# Patient Record
Sex: MALE | Race: WHITE | NOT HISPANIC OR LATINO | Employment: OTHER | URBAN - METROPOLITAN AREA
[De-identification: names, ages, dates, MRNs, and addresses within clinical notes are randomized per-mention and may not be internally consistent; named-entity substitution may affect disease eponyms.]

---

## 2017-06-23 ENCOUNTER — OFFICE VISIT (OUTPATIENT)
Dept: INTERNAL MEDICINE | Facility: CLINIC | Age: 62
End: 2017-06-23
Payer: COMMERCIAL

## 2017-06-23 VITALS
WEIGHT: 207.81 LBS | TEMPERATURE: 98 F | RESPIRATION RATE: 18 BRPM | BODY MASS INDEX: 28.15 KG/M2 | SYSTOLIC BLOOD PRESSURE: 120 MMHG | OXYGEN SATURATION: 98 % | HEIGHT: 72 IN | HEART RATE: 80 BPM | DIASTOLIC BLOOD PRESSURE: 70 MMHG

## 2017-06-23 DIAGNOSIS — Z78.9 PARTICIPANT IN HEALTH AND WELLNESS PLAN: Primary | ICD-10-CM

## 2017-06-23 PROCEDURE — 99386 PREV VISIT NEW AGE 40-64: CPT | Mod: S$GLB,,, | Performed by: INTERNAL MEDICINE

## 2017-06-23 RX ORDER — TADALAFIL 10 MG/1
10 TABLET ORAL DAILY PRN
Qty: 30 TABLET | Refills: 5 | Status: SHIPPED | OUTPATIENT
Start: 2017-06-23 | End: 2018-08-10

## 2017-06-23 NOTE — PROGRESS NOTES
Subjective:      Patient ID: Paddy Nair is a 61 y.o. male.    Chief Complaint: Medicare AWV (pt in for a wellness check)    HPI: 61y/oWM here to establish care thru a wellness visit.  PMH: none  SH: retired . + tab [id number] years. No ETOH NKA 2 healthy sons No pets         No exercise.  FH: Father ,61 of prostate cancer.    Review of Systems   Constitutional: Negative.    HENT: Negative.    Eyes: Negative.    Respiratory: Negative.    Cardiovascular: Negative.    Gastrointestinal: Negative.    Endocrine: Negative.    Genitourinary:        ED   Musculoskeletal: Negative.    Skin: Negative.    Allergic/Immunologic: Negative.    Neurological: Negative.    Hematological: Negative.    Psychiatric/Behavioral: Negative.        Objective:   /70 (BP Location: Right arm, Patient Position: Sitting, BP Method: Manual)   Pulse 80   Temp 98.4 °F (36.9 °C) (Oral)   Resp 18   Ht 6' (1.829 m)   Wt 94.3 kg (207 lb 12.5 oz)   SpO2 98%   BMI 28.18 kg/m²     Physical Exam   Constitutional: He is oriented to person, place, and time. He appears well-developed and well-nourished.   HENT:   Head: Normocephalic and atraumatic.   Right Ear: Tympanic membrane, external ear and ear canal normal. Decreased hearing is noted.   Left Ear: Tympanic membrane, external ear and ear canal normal. Decreased hearing is noted.   Nose: Nose normal.   Mouth/Throat: Uvula is midline, oropharynx is clear and moist and mucous membranes are normal. Tonsils are 1+ on the right. Tonsils are 1+ on the left.   Eyes: Conjunctivae and EOM are normal. Pupils are equal, round, and reactive to light.   Neck: Normal range of motion. Neck supple. No JVD present. No thyromegaly present.   Cardiovascular: Normal rate, regular rhythm, normal heart sounds and intact distal pulses.    Pulmonary/Chest: Effort normal and breath sounds normal.   Abdominal: Soft. Bowel sounds are normal.   Musculoskeletal: Normal range of motion.    Lymphadenopathy:     He has no cervical adenopathy.   Neurological: He is alert and oriented to person, place, and time. He has normal reflexes.   Skin: Skin is warm and dry. Capillary refill takes less than 2 seconds.   Psychiatric: He has a normal mood and affect. His behavior is normal. Judgment and thought content normal.   Nursing note and vitals reviewed.      Assessment:     1. Participant in health and wellness plan      Plan:     Participant in health and wellness plan  -     Ambulatory referral to Smoking Cessation Program  -     Cancel: CBC auto differential; Future; Expected date: 06/23/2017  -     Cancel: Lipid panel; Future; Expected date: 06/23/2017  -     Cancel: Comprehensive metabolic panel; Future; Expected date: 06/23/2017  -     Cancel: PSA, Screening; Future; Expected date: 06/23/2017  -     Cancel: Urinalysis; Future; Expected date: 06/23/2017  -     X-Ray Chest PA And Lateral; Future; Expected date: 06/23/2017  -     CBC auto differential; Future; Expected date: 06/23/2017  -     Lipid panel; Future; Expected date: 06/23/2017  -     Comprehensive metabolic panel; Future; Expected date: 06/23/2017  -     PSA, Screening; Future; Expected date: 06/23/2017  -     Urinalysis; Future; Expected date: 06/23/2017    Other orders  -     tadalafil (CIALIS) 10 MG tablet; Take 1 tablet (10 mg total) by mouth daily as needed for Erectile Dysfunction.  Dispense: 30 tablet; Refill: 5

## 2017-07-17 ENCOUNTER — TELEPHONE (OUTPATIENT)
Dept: INTERNAL MEDICINE | Facility: CLINIC | Age: 62
End: 2017-07-17

## 2017-07-17 LAB
ALBUMIN SERPL-MCNC: 4.3 G/DL (ref 3.6–5.1)
ALBUMIN/GLOB SERPL: 1.5 (CALC) (ref 1–2.5)
ALP SERPL-CCNC: 71 U/L (ref 40–115)
ALT SERPL-CCNC: 16 U/L (ref 9–46)
AST SERPL-CCNC: 16 U/L (ref 10–35)
BASOPHILS # BLD AUTO: 14 CELLS/UL (ref 0–200)
BASOPHILS NFR BLD AUTO: 0.2 %
BILIRUB SERPL-MCNC: 0.5 MG/DL (ref 0.2–1.2)
BUN SERPL-MCNC: 13 MG/DL (ref 7–25)
BUN/CREAT SERPL: NORMAL (CALC) (ref 6–22)
CALCIUM SERPL-MCNC: 9.2 MG/DL (ref 8.6–10.3)
CHLORIDE SERPL-SCNC: 104 MMOL/L (ref 98–110)
CHOLEST SERPL-MCNC: 214 MG/DL (ref 125–200)
CHOLEST/HDLC SERPL: 5.9 (CALC)
CO2 SERPL-SCNC: 28 MMOL/L (ref 20–31)
COLOR UR: NORMAL
CREAT SERPL-MCNC: 0.97 MG/DL (ref 0.7–1.25)
EOSINOPHIL # BLD AUTO: 112 CELLS/UL (ref 15–500)
EOSINOPHIL NFR BLD AUTO: 1.6 %
ERYTHROCYTE [DISTWIDTH] IN BLOOD BY AUTOMATED COUNT: 14.7 % (ref 11–15)
GFR SERPL CREATININE-BSD FRML MDRD: 84 ML/MIN/1.73M2
GLOBULIN SER CALC-MCNC: 2.9 G/DL (CALC) (ref 1.9–3.7)
GLUCOSE SERPL-MCNC: 97 MG/DL (ref 65–99)
HCT VFR BLD AUTO: 43.9 % (ref 38.5–50)
HDLC SERPL-MCNC: 36 MG/DL
HGB BLD-MCNC: 14.6 G/DL (ref 13.2–17.1)
LDLC SERPL CALC-MCNC: 144 MG/DL (CALC)
LYMPHOCYTES # BLD AUTO: 1862 CELLS/UL (ref 850–3900)
LYMPHOCYTES NFR BLD AUTO: 26.6 %
MCH RBC QN AUTO: 30.6 PG (ref 27–33)
MCHC RBC AUTO-ENTMCNC: 33.3 G/DL (ref 32–36)
MCV RBC AUTO: 92 FL (ref 80–100)
MONOCYTES # BLD AUTO: 371 CELLS/UL (ref 200–950)
MONOCYTES NFR BLD AUTO: 5.3 %
NEUTROPHILS # BLD AUTO: 4641 CELLS/UL (ref 1500–7800)
NEUTROPHILS NFR BLD AUTO: 66.3 %
NONHDLC SERPL-MCNC: 178 MG/DL (CALC)
PLATELET # BLD AUTO: 193 THOUSAND/UL (ref 140–400)
PMV BLD REES-ECKER: 9 FL (ref 7.5–12.5)
POTASSIUM SERPL-SCNC: 4.1 MMOL/L (ref 3.5–5.3)
PROT SERPL-MCNC: 7.2 G/DL (ref 6.1–8.1)
PSA SERPL-MCNC: 2.5 NG/ML
RBC # BLD AUTO: 4.77 MILLION/UL (ref 4.2–5.8)
SODIUM SERPL-SCNC: 141 MMOL/L (ref 135–146)
TRIGL SERPL-MCNC: 170 MG/DL
WBC # BLD AUTO: 7 THOUSAND/UL (ref 3.8–10.8)

## 2017-07-17 NOTE — TELEPHONE ENCOUNTER
----- Message from Sushma Espinoza sent at 7/17/2017  1:02 PM CDT -----  Contact: [phone]  TEST RESULTS:   Patient would like to get test results.  Name of test (lab, mammo, etc.): labs  Date of test    All results are in Xray of chest also blood work. A note from [facility] pt urine was not sent out correctly by lab person. Lab did not send urine in yellow tube. Pt has to resubmit another specimen. Vf/ma

## 2017-08-01 ENCOUNTER — TELEPHONE (OUTPATIENT)
Dept: INTERNAL MEDICINE | Facility: CLINIC | Age: 62
End: 2017-08-01

## 2017-08-01 DIAGNOSIS — Z00.00 ANNUAL PHYSICAL EXAM: Primary | ICD-10-CM

## 2017-08-01 NOTE — TELEPHONE ENCOUNTER
Patient's urine specimen from 6/2017 was not done due to lab error. Do you want him to re due this test?

## 2017-08-01 NOTE — TELEPHONE ENCOUNTER
----- Message from Sun Caro sent at 8/1/2017 12:58 PM CDT -----  Contact: patient   Patient called and would like to know if he needs to repeat his urine test, because something happened in the labs were it got messed up he did them here through [facility] . Call back [phone]

## 2017-11-06 ENCOUNTER — CLINICAL SUPPORT (OUTPATIENT)
Dept: SMOKING CESSATION | Facility: CLINIC | Age: 62
End: 2017-11-06
Payer: COMMERCIAL

## 2017-11-06 DIAGNOSIS — F17.210 HEAVY CIGARETTE SMOKER (20-39 PER DAY): Primary | ICD-10-CM

## 2017-11-06 PROCEDURE — 99999 PR PBB SHADOW E&M-EST. PATIENT-LVL I: CPT | Mod: PBBFAC,,,

## 2017-11-06 PROCEDURE — 99404 PREV MED CNSL INDIV APPRX 60: CPT | Mod: S$GLB,,,

## 2017-11-06 RX ORDER — VARENICLINE TARTRATE 0.5 (11)-1
KIT ORAL
Qty: 1 PACKAGE | Refills: 0 | Status: SHIPPED | OUTPATIENT
Start: 2017-11-06 | End: 2017-11-28 | Stop reason: SDUPTHER

## 2017-11-06 RX ORDER — IBUPROFEN 200 MG
1 TABLET ORAL DAILY
Qty: 14 PATCH | Refills: 0 | Status: SHIPPED | OUTPATIENT
Start: 2017-11-06 | End: 2018-09-21

## 2017-11-14 ENCOUNTER — CLINICAL SUPPORT (OUTPATIENT)
Dept: SMOKING CESSATION | Facility: CLINIC | Age: 62
End: 2017-11-14
Payer: COMMERCIAL

## 2017-11-14 DIAGNOSIS — F17.210 CIGARETTE SMOKER: Primary | ICD-10-CM

## 2017-11-14 PROCEDURE — 90853 GROUP PSYCHOTHERAPY: CPT | Mod: S$GLB,,,

## 2017-11-14 NOTE — Clinical Note
Patient is smoking 15 cpd down from 1 ppd last week.  Patient will attempt 12 cpd this week.  The patient remains on the prescribed tobacco cessation medication regimen of starter pack  Chantix 0.5 mg BID without any negative side effects at this time. Patient remains on prescribed tobacco cessation medication regimen of 21 mg patch, without any negative side effects at this time. The patient denies any abnormal behavioral or mental changes at this time

## 2017-11-16 NOTE — PROGRESS NOTES
Site: [facility]  Date:  11/14/17  Clinical Status of Patient: Outpatient   Length of Service and Code: 60 minutes - 75318   Number in Attendance: 3  Group Activities/Focus of Group:  orientation, client introductions, completion of TCRS (Tobacco Cessation Rating Scale) learned addiction model, cues/triggers, personal reasons for quitting, medications, goals, quit date    Target symptoms:  withdrawal and medication side effects             The following were rated moderate (3) to severe (4) on TCRS:       Moderate 3: none     Severe 4:   none  Patient's Response to Intervention: Patient is smoking 15 cpd down from 1 ppd last week.  Patient will attempt 12 cpd this week.  The patient remains on the prescribed tobacco cessation medication regimen of starter pack  Chantix 0.5 mg BID without any negative side effects at this time. Patient remains on prescribed tobacco cessation medication regimen of 21 mg patch, without any negative side effects at this time. The patient denies any abnormal behavioral or mental changes at this time. The patient will continue with group therapy sessions and medication monitoring by CTTS. Prescribed medication management will be by physician.           Progress Toward Goals and Other Mental Status Changes: The patient denies any abnormal behavioral or mental changes at this time.     Interval History:     Diagnosis: Z72.0  Plan: The patient will continue with group therapy sessions and medication regimen prescribed with management by physician or Cessation Clinic Provider. Patient will inform Smoking Clinic Cessation Counselor of symptoms as rated high on TCRS.    Return to Clinic: 1 week

## 2017-11-21 ENCOUNTER — CLINICAL SUPPORT (OUTPATIENT)
Dept: SMOKING CESSATION | Facility: CLINIC | Age: 62
End: 2017-11-21
Payer: COMMERCIAL

## 2017-11-21 DIAGNOSIS — F17.210 LIGHT CIGARETTE SMOKER (1-9 CIGARETTES PER DAY): Primary | ICD-10-CM

## 2017-11-21 PROCEDURE — 99999 PR PBB SHADOW E&M-EST. PATIENT-LVL I: CPT | Mod: PBBFAC,,,

## 2017-11-21 PROCEDURE — 99404 PREV MED CNSL INDIV APPRX 60: CPT | Mod: S$GLB,,,

## 2017-11-21 NOTE — Clinical Note
Patient is smoking 10 cpd .  Patient will attempt 5 cpd this week.  The patient remains on the prescribed tobacco cessation medication regimen of 1 mg Chantix BID without any negative side effects at this time. The patient denies any abnormal behavioral or mental changes at this time. The patient will continue with group therapy sessions and medication monitoring by CTTS.

## 2017-11-28 ENCOUNTER — CLINICAL SUPPORT (OUTPATIENT)
Dept: SMOKING CESSATION | Facility: CLINIC | Age: 62
End: 2017-11-28
Payer: COMMERCIAL

## 2017-11-28 DIAGNOSIS — F17.210 LIGHT CIGARETTE SMOKER (1-9 CIGS/DAY): Primary | ICD-10-CM

## 2017-11-28 PROCEDURE — 99404 PREV MED CNSL INDIV APPRX 60: CPT | Mod: S$GLB,,,

## 2017-11-28 PROCEDURE — 99999 PR PBB SHADOW E&M-EST. PATIENT-LVL I: CPT | Mod: PBBFAC,,,

## 2017-11-28 RX ORDER — VARENICLINE TARTRATE 1 MG/1
1 TABLET, FILM COATED ORAL 2 TIMES DAILY
Qty: 60 TABLET | Refills: 0 | Status: SHIPPED | OUTPATIENT
Start: 2017-11-28 | End: 2017-12-28

## 2017-11-28 NOTE — PROGRESS NOTES
Individual Follow-Up Form    11/27/17    Quit Date:     Clinical Status of Patient: Outpatient    Length of Service: 60 minutes    Continuing Medication: yes  Chantix    Other Medications: none     Target Symptoms: Withdrawal and medication side effects. The following were  rated moderate (3) to severe (4) on TCRS:  · Moderate (3): none  · Severe (4): none    Comments: Patient is smoking 10 cpd .  Patient will attempt 5 cpd this week.  The patient remains on the prescribed tobacco cessation medication regimen of 1 mg Chantix BID without any negative side effects at this time. The patient denies any abnormal behavioral or mental changes at this time. The patient will continue with group therapy sessions and medication monitoring by CTTS. Prescribed medication management will be by physician.completion of TCRS (Tobacco Cessation Rating Scale) reviewed strategies, cues, and triggers. Introduced the negative impact of tobacco on health, the health advantages of discontinuing the use of tobacco, time line improved health changes after a quit, withdrawal issues to expect from nicotine and habit, and ways to achieve the goal of a quit.          Diagnosis: F17.210    Next Visit: 1 week

## 2017-11-28 NOTE — Clinical Note
Patient is smoking 4-6 cpd.  Patient will attempt complete tobacco cessation this week.  The patient remains on the prescribed tobacco cessation medication regimen of 1 mg Chantix BID without any negative side effects at this time. The patient denies any abnormal behavioral or mental changes at this time.

## 2017-11-29 DIAGNOSIS — F17.210 LIGHT CIGARETTE SMOKER (1-9 CIGS/DAY): ICD-10-CM

## 2017-11-29 RX ORDER — VARENICLINE TARTRATE 1 MG/1
1 TABLET, FILM COATED ORAL 2 TIMES DAILY
Qty: 60 TABLET | Refills: 0 | OUTPATIENT
Start: 2017-11-29 | End: 2017-12-29

## 2017-11-29 NOTE — PROGRESS NOTES
Individual Follow-Up Form    11/28/17    Quit Date:     Clinical Status of Patient: Outpatient    Length of Service: 60 minutes    Continuing Medication: yes  Chantix    Other Medications: none     Target Symptoms: Withdrawal and medication side effects. The following were  rated moderate (3) to severe (4) on TCRS:  · Moderate (3): none  · Severe (4): none    Comments: Patient is smoking 4-6 cpd.  Patient will attempt complete tobacco cessation this week.  The patient remains on the prescribed tobacco cessation medication regimen of 1 mg Chantix BID without any negative side effects at this time. The patient denies any abnormal behavioral or mental changes at this time. The patient will continue with group therapy sessions and medication monitoring by CTTS. Prescribed medication management will be by physician.           Diagnosis: F17.210    Next Visit: 1 week

## 2017-12-04 ENCOUNTER — CLINICAL SUPPORT (OUTPATIENT)
Dept: SMOKING CESSATION | Facility: CLINIC | Age: 62
End: 2017-12-04
Payer: COMMERCIAL

## 2017-12-04 DIAGNOSIS — F17.210 LIGHT SMOKER: Primary | ICD-10-CM

## 2017-12-04 PROCEDURE — 99404 PREV MED CNSL INDIV APPRX 60: CPT | Mod: S$GLB,,,

## 2017-12-04 PROCEDURE — 99999 PR PBB SHADOW E&M-EST. PATIENT-LVL I: CPT | Mod: PBBFAC,,,

## 2017-12-04 NOTE — Clinical Note
Patient is smoking 1-2 cigarettes per day.  Patient will attempt complete tobacco cessation this week.  The patient remains on the prescribed tobacco cessation medication regimen of 1 mg Chantix BID without any negative side effects at this time. The patient will continue with group therapy sessions and medication monitoring by CTTS. Prescribed medication management will be by physician.

## 2017-12-05 NOTE — PROGRESS NOTES
Individual Follow-Up Form    12/5/2017    Quit Date:     Clinical Status of Patient: Outpatient    Length of Service: 60 minutes    Continuing Medication: yes  Chantix    Other Medications: none     Target Symptoms: Withdrawal and medication side effects. The following were  rated moderate (3) to severe (4) on TCRS:  · Moderate (3): none  · Severe (4): none    Comments: Patient is smoking 1-2 cigarettes per day.  Patient will attempt complete tobacco cessation this week.  The patient remains on the prescribed tobacco cessation medication regimen of 1 mg Chantix BID without any negative side effects at this time. The patient will continue with group therapy sessions and medication monitoring by CTTS. Prescribed medication management will be by physician. completion of TCRS (Tobacco Cessation Rating Scale) reviewed strategies, habitual behavior, high risks situations, understanding urges and cravings, stress and relaxation with open discussion and additional interventions, Introduced lapses, relapses, understanding them and analyzing the situation of a lapse, conflict issues that may be linked to a lapse.           Diagnosis: F17.210    Next Visit: 1 week

## 2017-12-12 ENCOUNTER — CLINICAL SUPPORT (OUTPATIENT)
Dept: SMOKING CESSATION | Facility: CLINIC | Age: 62
End: 2017-12-12
Payer: COMMERCIAL

## 2017-12-12 DIAGNOSIS — F17.200 NICOTINE DEPENDENCE: Primary | ICD-10-CM

## 2017-12-12 PROCEDURE — 99999 PR PBB SHADOW E&M-EST. PATIENT-LVL II: CPT | Mod: PBBFAC,,,

## 2017-12-12 PROCEDURE — 99402 PREV MED CNSL INDIV APPRX 30: CPT | Mod: S$GLB,,, | Performed by: FAMILY MEDICINE

## 2017-12-12 NOTE — Clinical Note
Patient presents to clinic tobacco free! Pt has not had a cigarette since last Sunday. Congratulated pt on his hard work and success. The patient remains on the prescribed tobacco cessation medication regimen of 1 mg Chantix BID without any negative side effects at this time. Pt's goal is to continue to remain tobacco free and continue Chantix for the the next 2 months. Completion of TCRS (Tobacco Cessation Rating Scale) reviewed strategies, cues, triggers, high risk situations, lapses, relapses, diet, exercise, stress, relaxation, sleep, habitual behavior, and life style changes. Pt is to f/u in 3 weeks.

## 2017-12-12 NOTE — PROGRESS NOTES
Individual Follow-Up Form    12/12/2017    Quit Date: 12/4/17    Clinical Status of Patient: Outpatient    Length of Service: 30 minutes    Continuing Medication: yes  Chantix    Other Medications: none     Target Symptoms: Withdrawal and medication side effects. The following were  rated moderate (3) to severe (4) on TCRS:  · Moderate (3): none  · Severe (4): none    Comments: Patient presents to clinic tobacco free! Pt has not had a cigarette since last Sunday. Congratulated pt on his hard work and success. The patient remains on the prescribed tobacco cessation medication regimen of 1 mg Chantix BID without any negative side effects at this time. Pt's goal is to continue to remain tobacco free and continue Chantix for the the next 2 months. Completion of TCRS (Tobacco Cessation Rating Scale) reviewed strategies, cues, triggers, high risk situations, lapses, relapses, diet, exercise, stress, relaxation, sleep, habitual behavior, and life style changes. Pt is to f/u in 3 weeks.       Diagnosis: F17.200    Next Visit: 3 weeks

## 2018-01-02 ENCOUNTER — CLINICAL SUPPORT (OUTPATIENT)
Dept: SMOKING CESSATION | Facility: CLINIC | Age: 63
End: 2018-01-02
Payer: COMMERCIAL

## 2018-01-02 DIAGNOSIS — F17.210 NICOTINE DEPENDENCE, CIGARETTES, UNCOMPLICATED: Primary | ICD-10-CM

## 2018-01-02 PROCEDURE — 99999 PR PBB SHADOW E&M-EST. PATIENT-LVL I: CPT | Mod: PBBFAC,,,

## 2018-01-02 PROCEDURE — 99402 PREV MED CNSL INDIV APPRX 30: CPT | Mod: S$GLB,,, | Performed by: FAMILY MEDICINE

## 2018-01-02 RX ORDER — VARENICLINE TARTRATE 1 MG/1
1 TABLET, FILM COATED ORAL 2 TIMES DAILY
Qty: 60 TABLET | Refills: 0 | Status: SHIPPED | OUTPATIENT
Start: 2018-01-02 | End: 2018-02-01

## 2018-01-02 NOTE — Clinical Note
completion of TCRS (Tobacco Cessation Rating Scale) reviewed strategies, habitual behavior, stress, and high risk situations. Introduced stress with addition interventions, SOLVE, relaxation with interventions, nutrition, exercise, weight gain, and the importance of rewarding oneself for accomplishments toward becoming tobacco free. Open discussion of all items with interventions. The patient remains tobacco free at this time. Discussed with patient about habits and to think of strategies for when not taking Chantix. Discussed diversion and activity The patient was supplied with the last refill of  1 mg BID Chantix.

## 2018-01-02 NOTE — PROGRESS NOTES
Individual Follow-Up Form    1/2/2018    Quit Date: ***    Clinical Status of Patient: Outpatient    Length of Service: {Smoking Length of Service:24482}    Continuing Medication: {Smoking Cessation Yes/No with medication:69433}    Other Medications: ***     Target Symptoms: Withdrawal and medication side effects. The following were  rated moderate (3) to severe (4) on TCRS:  · Moderate (3): ***  · Severe (4): ***    Comments: ***    Diagnosis: {Smoking Cessation Diagnosis:76855}    Next Visit: {Smoking Cessation Next Phone Intervention:86673}

## 2018-01-02 NOTE — PROGRESS NOTES
Individual Follow-Up Form    1/2/2018    Quit Date: 12/4/17    Clinical Status of Patient: Outpatient    Length of Service: 30 minutes    Continuing Medication: yes  Chantix    Other Medications: none     Target Symptoms: Withdrawal and medication side effects. The following were  rated moderate (3) to severe (4) on TCRS:  · Moderate (3): none  · Severe (4): none    Comments: completion of TCRS (Tobacco Cessation Rating Scale) reviewed strategies, habitual behavior, stress, and high risk situations. Introduced stress with addition interventions, SOLVE, relaxation with interventions, nutrition, exercise, weight gain, and the importance of rewarding oneself for accomplishments toward becoming tobacco free. Open discussion of all items with interventions. The patient remains tobacco free at this time. Discussed with patient about habits and to think of strategies for when not taking Chantix. Discussed diversion and activity The patient was supplied with the last refill of  1 mg BID Chantix. The patient will continue with group therapy sessions and medication monitoring by CTTS. Prescribed medication management will be by physician.     Diagnosis: F17.210    Next Visit: 2 weeks

## 2018-01-02 NOTE — Clinical Note
completion of TCRS (Tobacco Cessation Rating Scale) reviewed strategies, habitual behavior, stress, and high risk situations. Introduced stress with addition interventions, SOLVE, relaxation with interventions, nutrition, exercise, weight gain, and the importance of rewarding oneself for accomplishments toward becoming tobacco free. Open discussion of all items with interventions. The patient remains tobacco free at this time. Discussed with patient about habits and to think of strategies for when not taking Chantix. Discussed diversion and activity The patient was supplied with the last refill of  1 mg BID Chantix. The patient will continue with group therapy sessions

## 2018-01-02 NOTE — PROGRESS NOTES
Individual Follow-Up Form    1/2/2018    Quit Date: ***    Clinical Status of Patient: Outpatient    Length of Service: {Smoking Length of Service:78619}    Continuing Medication: {Smoking Cessation Yes/No with medication:89968}    Other Medications: ***     Target Symptoms: Withdrawal and medication side effects. The following were  rated moderate (3) to severe (4) on TCRS:  · Moderate (3): ***  · Severe (4): ***    Comments: ***    Diagnosis: {Smoking Cessation Diagnosis:51273}    Next Visit: {Smoking Cessation Next Phone Intervention:78780}

## 2018-01-15 ENCOUNTER — CLINICAL SUPPORT (OUTPATIENT)
Dept: SMOKING CESSATION | Facility: CLINIC | Age: 63
End: 2018-01-15
Payer: COMMERCIAL

## 2018-01-15 VITALS — SYSTOLIC BLOOD PRESSURE: 121 MMHG | DIASTOLIC BLOOD PRESSURE: 102 MMHG

## 2018-01-15 DIAGNOSIS — F17.210 NICOTINE DEPENDENCE, CIGARETTES, UNCOMPLICATED: Primary | ICD-10-CM

## 2018-01-15 PROCEDURE — 99999 PR PBB SHADOW E&M-EST. PATIENT-LVL II: CPT | Mod: PBBFAC,,,

## 2018-01-15 PROCEDURE — 99402 PREV MED CNSL INDIV APPRX 30: CPT | Mod: S$GLB,,, | Performed by: FAMILY MEDICINE

## 2018-01-15 NOTE — PROGRESS NOTES
Individual Follow-Up Form    1/15/2018    Quit Date: 12/4/17    Clinical Status of Patient: Outpatient    Length of Service: 30 minutes    Continuing Medication: yes  Chantix    Other Medications: none     Target Symptoms: Withdrawal and medication side effects. The following were  rated moderate (3) to severe (4) on TCRS:  · Moderate (3): none  · Severe (4): none    Comments: The patient has been smoke free since 12/4/17and continues on 1 mg Chantix BID for 2 weeks. The patient denies any abnormal behavorial or mental changes at this time. The patient states he is able to be around smokers without any cravings at this time. Discussed how to mange cravings or desires in the future. The paitient remains optimistic about quitting cigarette smoking.The patient will continue with individual therapy sessions and medication monitoring by CTTS. Prescribed medication management will be by physician.     Diagnosis: F17.210    Next Visit: 4 weeks

## 2018-01-15 NOTE — Clinical Note
The patient has been smoke free since 12/4/17and continues on 1 mg Chantix BID for 2 weeks. The patient denies any abnormal behavorial or mental changes at this time. The patient states he is able to be around smokers without any cravings at this time. Discussed how to mange cravings or desires in the future. The paitient remains optimistic about quitting cigarette smoking.

## 2018-02-12 ENCOUNTER — CLINICAL SUPPORT (OUTPATIENT)
Dept: SMOKING CESSATION | Facility: CLINIC | Age: 63
End: 2018-02-12
Payer: COMMERCIAL

## 2018-02-12 DIAGNOSIS — F17.210 NICOTINE DEPENDENCE, CIGARETTES, UNCOMPLICATED: Primary | ICD-10-CM

## 2018-02-12 PROCEDURE — 99404 PREV MED CNSL INDIV APPRX 60: CPT | Mod: S$GLB,,, | Performed by: FAMILY MEDICINE

## 2018-02-12 PROCEDURE — 99999 PR PBB SHADOW E&M-EST. PATIENT-LVL I: CPT | Mod: PBBFAC,,,

## 2018-02-12 NOTE — Clinical Note
The patient has been smoke free since 12/4/17 and has not been using Chantix for about 2 weeks. The patient is able to be around friends that smoke and still not have cravings. The patient is aware of high risk situations such as alcohol, stress, etc. The patient has support from spouse that is now smoke free and feels positive about this quit and remaining tobacco free. The patient was instructed to use gum for any extreme cravings and to call if cravings/desires return.

## 2018-02-13 NOTE — PROGRESS NOTES
Individual Follow-Up Form    2/12/2018    Quit Date: 12/4/18    Clinical Status of Patient: Outpatient    Length of Service: 30 minutes    Continuing Medication: no    Other Medications: none     Target Symptoms: Withdrawal and medication side effects. The following were  rated moderate (3) to severe (4) on TCRS:  · Moderate (3): none  · Severe (4): none    Comments: The patient has been smoke free since 12/4/17 and has not been using Chantix for about 2 weeks. The patient is able to be around friends that smoke and still not have cravings. The patient is aware of high risk situations such as alcohol, stress, etc. The patient has support from spouse that is now smoke free and feels positive about this quit and remaining tobacco free. The patient was instructed to use gum for any extreme cravings and to call if cravings/desires return.    Diagnosis: F17.210    Next Visit The patient has graduated from program after tobacco free for 2 months.

## 2018-05-30 ENCOUNTER — TELEPHONE (OUTPATIENT)
Dept: SMOKING CESSATION | Facility: CLINIC | Age: 63
End: 2018-05-30

## 2018-05-31 ENCOUNTER — CLINICAL SUPPORT (OUTPATIENT)
Dept: SMOKING CESSATION | Facility: CLINIC | Age: 63
End: 2018-05-31
Payer: COMMERCIAL

## 2018-05-31 DIAGNOSIS — F17.200 NICOTINE DEPENDENCE: Primary | ICD-10-CM

## 2018-05-31 PROCEDURE — 99407 BEHAV CHNG SMOKING > 10 MIN: CPT | Mod: S$GLB,,,

## 2018-08-02 ENCOUNTER — TELEPHONE (OUTPATIENT)
Dept: FAMILY MEDICINE | Facility: CLINIC | Age: 63
End: 2018-08-02

## 2018-08-02 NOTE — TELEPHONE ENCOUNTER
----- Message from Nuha Nair sent at 8/2/2018  8:52 AM CDT -----  Contact: self/[phone]  Patient would like orders placed in the system for an upcoming appointment.      Please call and advise when this has been done.

## 2018-08-03 NOTE — TELEPHONE ENCOUNTER
----- Message from Snehal Paez sent at 8/3/2018  8:15 AM CDT -----  Contact: [phone]/self  Patient is requesting orders for lab work to [facility]. Please advise.

## 2018-08-06 ENCOUNTER — TELEPHONE (OUTPATIENT)
Dept: FAMILY MEDICINE | Facility: CLINIC | Age: 63
End: 2018-08-06

## 2018-08-06 DIAGNOSIS — Z13.6 ENCOUNTER FOR SCREENING FOR CARDIOVASCULAR DISORDERS: ICD-10-CM

## 2018-08-06 DIAGNOSIS — Z12.5 SCREENING FOR PROSTATE CANCER: ICD-10-CM

## 2018-08-06 DIAGNOSIS — Z11.59 NEED FOR HEPATITIS C SCREENING TEST: ICD-10-CM

## 2018-08-06 DIAGNOSIS — Z00.00 ENCOUNTER FOR WELLNESS EXAMINATION: Primary | ICD-10-CM

## 2018-08-06 NOTE — TELEPHONE ENCOUNTER
----- Message from Ingris Santana sent at 8/6/2018  8:55 AM CDT -----  Contact: [phone]/ self   Pt its requesting lab work orders prior to his annual check up . Pt its requesting a call back ASAP to schedule his lab work . Please advise

## 2018-08-10 ENCOUNTER — OFFICE VISIT (OUTPATIENT)
Dept: INTERNAL MEDICINE | Facility: CLINIC | Age: 63
End: 2018-08-10
Payer: COMMERCIAL

## 2018-08-10 ENCOUNTER — PATIENT MESSAGE (OUTPATIENT)
Dept: INTERNAL MEDICINE | Facility: CLINIC | Age: 63
End: 2018-08-10

## 2018-08-10 VITALS
HEIGHT: 73 IN | HEART RATE: 91 BPM | WEIGHT: 212.94 LBS | SYSTOLIC BLOOD PRESSURE: 120 MMHG | OXYGEN SATURATION: 95 % | DIASTOLIC BLOOD PRESSURE: 82 MMHG | BODY MASS INDEX: 28.22 KG/M2 | TEMPERATURE: 98 F

## 2018-08-10 DIAGNOSIS — R53.83 OTHER FATIGUE: ICD-10-CM

## 2018-08-10 DIAGNOSIS — Z78.9 PARTICIPANT IN HEALTH AND WELLNESS PLAN: Primary | ICD-10-CM

## 2018-08-10 DIAGNOSIS — Z12.11 COLON CANCER SCREENING: ICD-10-CM

## 2018-08-10 DIAGNOSIS — Z87.898 HISTORY OF SNORING: Primary | ICD-10-CM

## 2018-08-10 DIAGNOSIS — N52.9 ERECTILE DYSFUNCTION, UNSPECIFIED ERECTILE DYSFUNCTION TYPE: Primary | ICD-10-CM

## 2018-08-10 PROCEDURE — 99999 PR PBB SHADOW E&M-EST. PATIENT-LVL III: CPT | Mod: PBBFAC,,, | Performed by: INTERNAL MEDICINE

## 2018-08-10 PROCEDURE — 99396 PREV VISIT EST AGE 40-64: CPT | Mod: S$GLB,,, | Performed by: INTERNAL MEDICINE

## 2018-08-10 RX ORDER — TADALAFIL 10 MG/1
10 TABLET ORAL DAILY PRN
Qty: 10 TABLET | Refills: 5 | Status: SHIPPED | OUTPATIENT
Start: 2018-08-10 | End: 2019-09-19

## 2018-08-10 NOTE — PROGRESS NOTES
Subjective:      Patient ID: Paddy Nair is a 62 y.o. male.    Chief Complaint: Annual Exam    HPI: 62 y.o. White male , last seen 1 year ago for a wellness exam.  Here for the same.  No new medical issues.   Quit smoking as of Dec. 2017  Has gained weight because of that.    Summertime precludes outside exercise.    Reviewed labs with pt:  08/07/18 0816 PSA 5.0 High Final result   08/07/18 0816 HDL 41 - Final result   08/07/18 0816 CHOL 194 - Final result   08/07/18 0816 TRIG 163 High Final result   08/07/18 0816 LDLCALC 120.4 - Final result   08/07/18 0816 CHOLHDL 21.1 - Final result   08/07/18 0816 NONHDLCHOL 153 - Final result   08/07/18 0816 TOTALCHOLEST 4.7 - Final result   08/07/18 0813 COLORU Yellow - Final result   08/07/18 0813 APPEARANCEUA Clear - Final result   08/07/18 0813 SPECGRAV 1.015 - Final result   08/07/18 0813 PHUR 6.0 - Final result   08/07/18 0813 KETONESU Negative - Final result   08/07/18 0813 OCCULTUA Negative - Final result   08/07/18 0813 NITRITE Negative - Final result   08/07/18 0813 UROBILINOGEN Negative - Final result   08/07/18 0813 LEUKOCYTESUR Negative - Final result   08/07/18 0816 WBC 7.55 - Final result   08/07/18 0816 RBC 4.59 Low Final result   08/07/18 0816 HGB 13.9 Low Final result   08/07/18 0816 HCT 42.7 - Final result   08/07/18 0816 MCH 30.3 - Final result   08/07/18 0816 RDW 13.8 - Final result   08/07/18 0816  - Final result   08/07/18 0816 MPV 10.1 - Final result   08/07/18 0816  High Final result   08/07/18 0816 BUN 11 - Final result   08/07/18 0816 CREATININE 1.06 - Final result   08/07/18 0816 CALCIUM 9.6 - Final result   08/07/18 0816  - Final result   08/07/18 0816 K 5.1 - Final result   08/07/18 0816  - Final result   08/07/18 0816 PROT 7.7 - Final result   08/07/18 0816 ALBUMIN 4.4 - Final result   08/07/18 0816 BILITOT 0.7 - Final result   08/07/18 0816 AST 26 - Final result   08/07/18 0816 ALKPHOS 76 - Final result  "  08/07/18 0816 CO2 27 - Final result   08/07/18 0816 ALT 33 - Final result   08/07/18 0816 ANIONGAP 10 - Final result   08/07/18 0816 EGFRNONAA >60.0 - Final result   08/07/18 0816 ESTGFRAFRICA >60.0 - Final result   08/07/18 0816 MCV 93 - Final result         Review of Systems   Constitutional: Negative for activity change and unexpected weight change.   HENT: Negative for hearing loss, rhinorrhea and trouble swallowing.    Eyes: Negative for discharge and visual disturbance.   Respiratory: Negative for chest tightness and wheezing.    Cardiovascular: Negative for chest pain and palpitations.   Gastrointestinal: Negative for blood in stool, constipation, diarrhea and vomiting.   Endocrine: Negative for polydipsia and polyuria.   Genitourinary: Negative for difficulty urinating, hematuria and urgency.   Musculoskeletal: Negative for arthralgias, joint swelling and neck pain.   Neurological: Negative for weakness and headaches.   Psychiatric/Behavioral: Negative for confusion and dysphoric mood.       Objective:   /82 (BP Location: Left arm, Patient Position: Sitting, BP Method: Medium (Manual))   Pulse 91   Temp 97.8 °F (36.6 °C) (Oral)   Ht 6' 1" (1.854 m)   Wt 96.6 kg (212 lb 15.4 oz)   SpO2 95%   BMI 28.10 kg/m²     Physical Exam   Constitutional: He is oriented to person, place, and time. He appears well-developed and well-nourished.   HENT:   Head: Normocephalic and atraumatic.   Right Ear: External ear normal.   Left Ear: External ear normal.   Nose: Nose normal.   Mouth/Throat: Oropharynx is clear and moist.   Eyes: Conjunctivae and EOM are normal. Pupils are equal, round, and reactive to light.   Neck: Normal range of motion. Neck supple.   Cardiovascular: Normal rate, regular rhythm and normal heart sounds.    Pulmonary/Chest: Effort normal and breath sounds normal.   Abdominal: Soft. Bowel sounds are normal.   Musculoskeletal: Normal range of motion.   Neurological: He is alert and oriented to " person, place, and time.   Skin: Skin is warm and dry.   Psychiatric: He has a normal mood and affect. His behavior is normal. Judgment and thought content normal.   Nursing note and vitals reviewed.      Assessment:     1. Participant in health and wellness plan    Discussed weight loss, portion control,exercise, need to watch carefully his numbers.  Plan:     Participant in health and wellness plan

## 2018-08-13 ENCOUNTER — TELEPHONE (OUTPATIENT)
Dept: INTERNAL MEDICINE | Facility: CLINIC | Age: 63
End: 2018-08-13

## 2018-08-13 NOTE — TELEPHONE ENCOUNTER
----- Message from Alexus Salazar sent at 8/13/2018  2:50 PM CDT -----  Contact: pt   Name of Who is Calling: NICHOLAS MCKEON [[id number]]      What is the request in detail: Patient states he is returning a call from staff. Please contact to further discuss and advise      Can the clinic reply by [facility]: No       What Number to Call Back if not in [facility]: [phone]

## 2018-08-14 ENCOUNTER — PATIENT MESSAGE (OUTPATIENT)
Dept: INTERNAL MEDICINE | Facility: CLINIC | Age: 63
End: 2018-08-14

## 2018-08-21 ENCOUNTER — OFFICE VISIT (OUTPATIENT)
Dept: UROLOGY | Facility: CLINIC | Age: 63
End: 2018-08-21
Payer: COMMERCIAL

## 2018-08-21 VITALS — HEIGHT: 73 IN | WEIGHT: 212 LBS | BODY MASS INDEX: 28.1 KG/M2

## 2018-08-21 DIAGNOSIS — N52.03 COMBINED ARTERIAL INSUFFICIENCY AND CORPORO-VENOUS OCCLUSIVE ERECTILE DYSFUNCTION: ICD-10-CM

## 2018-08-21 DIAGNOSIS — R79.89 LOW TESTOSTERONE: ICD-10-CM

## 2018-08-21 DIAGNOSIS — R97.20 ELEVATED PSA, LESS THAN 10 NG/ML: ICD-10-CM

## 2018-08-21 PROBLEM — N52.9 VASCULOGENIC ERECTILE DYSFUNCTION: Status: ACTIVE | Noted: 2018-08-21

## 2018-08-21 PROCEDURE — 99204 OFFICE O/P NEW MOD 45 MIN: CPT | Mod: S$GLB,,, | Performed by: UROLOGY

## 2018-08-21 PROCEDURE — 99999 PR PBB SHADOW E&M-EST. PATIENT-LVL II: CPT | Mod: PBBFAC,,, | Performed by: UROLOGY

## 2018-08-21 NOTE — PROGRESS NOTES
Subjective:       Patient ID: Paddy Nair is a 62 y.o. male.    Chief Complaint: No chief complaint on file.    61 yo WM with elevated PSA, ED and 5 yr history of symptoms of low T and emotional lability. Family history of CAP. Referred per Dr Garcia for evaluation of elevated PSA.      Erectile Dysfunction   This is a chronic problem. The current episode started more than 1 year ago. The problem has been gradually worsening since onset. The nature of his difficulty is achieving erection and maintaining erection. He reports no anxiety, decreased libido or performance anxiety. He reports his erection duration to be less than 1 minute. Irritative symptoms include frequency (x 5-6) and nocturia (x1). Irritative symptoms do not include urgency. Obstructive symptoms include dribbling (mild), a slower stream and a weak stream. Obstructive symptoms do not include incomplete emptying, an intermittent stream or straining. Associated symptoms include genital pain and hesitancy (mild). Pertinent negatives include no chills, dysuria, hematuria or inability to urinate. Nothing aggravates the symptoms. Past treatments include tadalafil. The treatment provided mild relief. He has been using treatment for 2 or more years. He has had no adverse reactions caused by medications. Risk factors include no AM erections, BPH and tobacco use.   Other   This is a new (Elevated PSA) problem. Pertinent negatives include no abdominal pain, arthralgias, chest pain, chills, congestion, coughing, diaphoresis, fatigue, fever, headaches, myalgias, nausea, rash, vomiting or weakness.     Review of Systems   Constitutional: Negative for activity change, appetite change, chills, diaphoresis, fatigue, fever and unexpected weight change.   HENT: Negative for congestion, hearing loss, sinus pressure and trouble swallowing.    Eyes: Negative for photophobia, pain, discharge and visual disturbance.   Respiratory: Negative for apnea, cough and  shortness of breath.    Cardiovascular: Negative for chest pain, palpitations and leg swelling.   Gastrointestinal: Negative for abdominal distention, abdominal pain, anal bleeding, blood in stool, constipation, diarrhea, nausea, rectal pain and vomiting.   Endocrine: Negative for cold intolerance, heat intolerance, polydipsia, polyphagia and polyuria.   Genitourinary: Positive for frequency (x 5-6), hesitancy (mild) and nocturia (x1). Negative for decreased libido, decreased urine volume, difficulty urinating, discharge, dysuria, enuresis, flank pain, genital sores, hematuria, incomplete emptying, penile pain, penile swelling, scrotal swelling, testicular pain and urgency.   Musculoskeletal: Negative for arthralgias, back pain and myalgias.   Skin: Negative for color change, pallor, rash and wound.   Allergic/Immunologic: Negative for environmental allergies, food allergies and immunocompromised state.   Neurological: Negative for dizziness, seizures, weakness and headaches.   Hematological: Negative for adenopathy. Does not bruise/bleed easily.   Psychiatric/Behavioral: Negative.  The patient is not nervous/anxious.        Objective:      Physical Exam   Nursing note and vitals reviewed.  Constitutional: He is oriented to person, place, and time. He appears well-developed and well-nourished.   HENT:   Head: Normocephalic.   Nose: Nose normal.   Mouth/Throat: Oropharynx is clear and moist.   Eyes: Conjunctivae and EOM are normal. Pupils are equal, round, and reactive to light.   Neck: Normal range of motion. Neck supple.   Cardiovascular: Normal rate, regular rhythm, normal heart sounds and intact distal pulses.    Pulmonary/Chest: Effort normal and breath sounds normal.   Abdominal: Soft. Bowel sounds are normal.   Genitourinary: Rectum normal, testes normal and penis normal. Prostate is enlarged. Prostate is not tender.         Genitourinary Comments: Right hydrocele   Musculoskeletal: Normal range of motion.    Neurological: He is alert and oriented to person, place, and time. He has normal reflexes.   Skin: Skin is warm and dry.     Psychiatric: He has a normal mood and affect. His behavior is normal. Judgment and thought content normal.       Assessment:       1. Elevated PSA, less than 10 ng/ml    2. Combined arterial insufficiency and corporo-venous occlusive erectile dysfunction    3. Low testosterone        Plan:       Patient Instructions   Check PSA and T levels as weli as Estrogen levels.  If Free PSA is low and PSA is greater than 4 will plan a biopsy.  If normal will follow up in 6 months  Viagra 100 mg trial  F/U 4 weeks to follow up on therapy

## 2018-08-21 NOTE — PATIENT INSTRUCTIONS
Check PSA and T levels as weli as Estrogen levels.  If Free PSA is low and PSA is greater than 4 will plan a biopsy.  If normal will follow up in 6 months  Viagra 100 mg trial  F/U 4 weeks to follow up on therapy

## 2018-08-21 NOTE — LETTER
August 21, 2018      Machelle Garcia MD  [address]  [address]  [facility] [address]           [facility] [facility]  [address] [place] [address] [address]  [facility] [address]  Phone: [phone]  Fax: [phone]          Patient: Paddy Nair   MR Number: [id number]   YOB: 1955   Date of Visit: 8/21/2018       Dear Dr. Machelle Garcia:    Thank you for referring Paddy Nair to me for evaluation. Attached you will find relevant portions of my assessment and plan of care.    If you have questions, please do not hesitate to call me. I look forward to following Paddy Nair along with you.    Sincerely,    Gagandeep Carrillo MD    Enclosure  CC:  No Recipients    If you would like to receive this communication electronically, please contact [email or web address] or [phone] to request more information on [employer] Link access.    For providers and/or their staff who would like to refer a patient to [facility], please contact us through our one-stop-shop provider referral line, [facility], at [phone].    If you feel you have received this communication in error or would no longer like to receive these types of communications, please e-mail [email or web address]

## 2018-09-05 ENCOUNTER — TELEPHONE (OUTPATIENT)
Dept: INTERNAL MEDICINE | Facility: CLINIC | Age: 63
End: 2018-09-05

## 2018-09-05 DIAGNOSIS — R73.9 HYPERGLYCEMIA: Primary | ICD-10-CM

## 2018-09-05 NOTE — TELEPHONE ENCOUNTER
----- Message from Diego Jurado LPN sent at 8/10/2018  2:24 PM CDT -----  CMP in 1 month from 8/7/18

## 2018-09-11 NOTE — TELEPHONE ENCOUNTER
Please associate dx as to why pt needs repeat CMP. I believe it was d/t hyperglycemia but not sure.

## 2018-09-18 ENCOUNTER — OFFICE VISIT (OUTPATIENT)
Dept: SLEEP MEDICINE | Facility: CLINIC | Age: 63
End: 2018-09-18
Payer: COMMERCIAL

## 2018-09-18 ENCOUNTER — TELEPHONE (OUTPATIENT)
Dept: SLEEP MEDICINE | Facility: CLINIC | Age: 63
End: 2018-09-18

## 2018-09-18 VITALS
DIASTOLIC BLOOD PRESSURE: 82 MMHG | HEIGHT: 73 IN | BODY MASS INDEX: 27.73 KG/M2 | SYSTOLIC BLOOD PRESSURE: 127 MMHG | HEART RATE: 71 BPM | WEIGHT: 209.19 LBS

## 2018-09-18 DIAGNOSIS — G47.30 SLEEP APNEA, UNSPECIFIED TYPE: Primary | ICD-10-CM

## 2018-09-18 PROCEDURE — 99999 PR PBB SHADOW E&M-EST. PATIENT-LVL III: CPT | Mod: PBBFAC,,, | Performed by: PSYCHIATRY & NEUROLOGY

## 2018-09-18 PROCEDURE — 99204 OFFICE O/P NEW MOD 45 MIN: CPT | Mod: S$GLB,,, | Performed by: PSYCHIATRY & NEUROLOGY

## 2018-09-18 NOTE — PROGRESS NOTES
Paddy Nair  was seen at the request of  Machelle Garcia MD for sleep evaluation.    09/18/2018 INITIAL HISTORY OF PRESENT ILLNESS:  Paddy Nair is a 63 y.o. male is here to be evaluated for a sleep disorder.       CHIEF COMPLAINT:      The patient's complaints include excessive daytime sleepiness, excessive daytime fatigue, snoring,  witnessed breathing pauses,  gasping for air in sleep and interrupted sleep since  10 years ago.      EPWORTH SLEEPINESS SCALE 9/16/2018   Sitting and reading 3   Watching TV 3   Sitting, inactive in a public place (e.g. a theatre or a meeting) 3   As a passenger in a car for an hour without a break 2   Lying down to rest in the afternoon when circumstances permit 3   Sitting and talking to someone 2   Sitting quietly after a lunch without alcohol 1   In a car, while stopped for a few minutes in traffic 0   Total score 17       SLEEP ROUTINE AND LIFESTYLE 09/18/2018 :  Sleep Clinic New Patient 9/16/2018   What time do you go to bed on a week day? (Give a range) 10 PM   What time do you go to bed on a day off? (Give a range) 11 - 12 PM   How long does it take you to fall asleep? (Give a range) 10 - 20 minutes   On average, how many times per night do you wake up? 3   How long does it take you to fall back into sleep? (Give a range) 5 - 10 minutes   What time do you wake up to start your day on a week day? (Give a range) 5 AM   What time do you wake up to start your day on a day off? (Give a range) 7:30 - 8:00   What time do you get out of bed? (Give a range) 5:00 AM During the week, 7:30 - 8:00 on the weekend   On average, how many hours do you sleep? 6   On average, how many naps do you take per day? 0   Rate your sleep quality from 0 to 5 (0-poor, 5-great). 2   Have you experienced:  N/a   How much weight have you lost or gained (in lbs.) in the last year? 0   On average, how many times per night do you go to the bathroom?  2   Have you ever had a sleep  study/CPAP machine/surgery for sleep apnea? No   Have you ever had a CPAP machine for sleep apnea? No   Have you ever had surgery for sleep apnea? No       Sleep Clinic ROS  2018   Difficulty breathing through the nose?  Sometimes   Sore throat or dry mouth in the morning? Sometimes   Irregular or very fast heart beat?  No   Shortness of breath?  No   Acid reflux? Yes   Body aches and pains?  Yes   Morning headaches? No   Dizziness? No   Mood changes?  No   Do you exercise?  Sometimes   Do you feel like moving your legs a lot?  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 No pertinent surgical history.      FAMILY HISTORY:                Family History   Problem Relation Age of Onset    Prostate cancer Neg Hx     Kidney disease Neg Hx        SOCIAL HISTORY:          Tobacco:   Social History     Tobacco Use   Smoking Status Former Smoker    Types: Cigarettes    Last attempt to quit: 2017    Years since quittin.7   Smokeless Tobacco Never Used       alcohol use:    Social History     Substance and Sexual Activity   Alcohol Use Yes    Alcohol/week: 0.6 - 1.2 oz    Types: 1 - 2 Cans of beer per week    Comment: occ                   ALLERGIES:  Review of  "patient's allergies indicates:  No Known Allergies    CURRENT MEDICATIONS:    Current Outpatient Medications   Medication Sig Dispense Refill    sildenafil (VIAGRA) 100 MG tablet TAKE ONE TABLET BY MOUTH DAILY AS NEEDED FOR ERECTILE DYSFUNCTION. 10 tablet 0    tadalafil (CIALIS) 10 MG tablet Take 1 tablet (10 mg total) by mouth daily as needed for Erectile Dysfunction. 10 tablet 5    nicotine (NICODERM CQ) 21 mg/24 hr Place 1 patch onto the skin once daily. 14 patch 0     No current facility-administered medications for this visit.                         PHYSICAL EXAM:  /82   Pulse 71   Ht 6' 1" (1.854 m)   Wt 94.9 kg (209 lb 3.5 oz)   BMI 27.60 kg/m²   GENERAL: Normal development, well groomed.  HEENT:   HEENT:  Conjunctivae are non-erythematous; Pupils equal, round, and reactive to light; Nose is symmetrical; Nasal mucosa is pink and moist; Septum is midline; Inferior turbinates are normal; Nasal airflow is normal; Posterior pharynx is pink; Modified Mallampati:III-IV; Posterior palate is low; Tonsils not visualized; Uvula is wide and elongated;Tongue is enlarged; Dentition is fair; No TMJ tenderness; Jaw opening and protrusion without click and without discomfort.  NECK: Supple. Neck circumference is 17.5 inches. No thyromegaly. No palpable nodes.     SKIN: On face and neck: No abrasions, no rashes, no lesions.  No subcutaneous nodules are palpable.  RESPIRATORY: Chest is clear to auscultation.  Normal chest expansion and non-labored breathing at rest.  CARDIOVASCULAR: Normal S1, S2.  No murmurs, gallops or rubs. No carotid bruits bilaterally.  No edema. No clubbing. No cyanosis.    NEURO: Oriented to time, place and person. Normal attention span and concentration. Gait normal.    PSYCH: Affect is full. Mood is normal  MUSCULOSKELETAL: Moves 4 extremities. Gait normal.         Using [facility]: yes      ASSESSMENT:    1. Sleep Apnea NEC. The patient symptomatically has  excessive daytime sleepiness, " "snoring,  witnessed breathing pauses, excessive daytime fatigue, gasping for air in sleep, interrupted sleep and nocturia  with exam findings of "a crowded oral airway and elevated body mass index. The patient has medical co-morbidities of hyperlipidemia and hypertension,  which can be worsened by GUILLERMO. This warrants further investigation for possible obstructive sleep apnea.          PLAN:    Diagnostic: Polysomnogram home. The nature of this procedure and its indication was discussed with the patient. he would  like to come discuss PSG results.    Weight loss strategies were discussed in detail       More than 15 minutes of this 30 minutes visit was spent in counseling: during our discussion today, we talked about the etiology of  GUILLERMO as well as the potential ramifications of untreated sleep apnea, which could include daytime sleepiness, hypertension, heart disease and/or stroke.  We discussed potential treatment options, which could include weight loss, body positioning, continuous positive airway pressure (CPAP), or referral for surgical consideration. Meanwhile, he  is urged to avoid supine sleep, weight gain and alcoholic beverages since all of these can worsen GUILLERMO.     Precautions: The patient was advised to abstain from driving should he feel sleepy or drowsy.    Follow up: MD/NP  after the sleep study has been completed.     Thank you for allowing me the opportunity to participate in the care of your patient.    This visit summary will be sent to referring provider via inbasket        "

## 2018-09-18 NOTE — LETTER
September 23, 2018      Machelle Garcia MD  [address]  [address]   [facility] [address]           [facility]  [address]  [facility] [address]  Phone: [phone]          Patient: Paddy Nair   MR Number: [id number]   YOB: 1955   Date of Visit: 9/18/2018       Dear Dr. Machelle Garcia:    Thank you for referring Paddy Nair to me for evaluation. Attached you will find relevant portions of my assessment and plan of care.    If you have questions, please do not hesitate to call me. I look forward to following Paddy Nair along with you.    Sincerely,    Elina Chamorro MD    Enclosure  CC:  No Recipients    If you would like to receive this communication electronically, please contact [email or web address] or [phone] to request more information on [employer] Link access.    For providers and/or their staff who would like to refer a patient to [facility], please contact us through our one-stop-shop provider referral line, [facility], at [phone].    If you feel you have received this communication in error or would no longer like to receive these types of communications, please e-mail [email or web address]

## 2018-09-18 NOTE — PATIENT INSTRUCTIONS
SLEEP LAB ([facility]) will contact you to schedulethe sleep study. Their number is [phone] (ext 2). Please call them if you do not hear from them in 10 business days from now.  The [facility] is located on 7th floor of the [facility]; [facility] is located in [facility].    SLEEP CLINIC (my assistant) will call you when the sleep study results are ready - if you have not heard from us by 2 weeks from the date of the study, please call [phone] (ext 1) or you can use [facility] to contact me.    You are advised to abstain from driving should you feel sleepy or drowsy.

## 2018-09-24 ENCOUNTER — PATIENT MESSAGE (OUTPATIENT)
Dept: ADMINISTRATIVE | Facility: OTHER | Age: 63
End: 2018-09-24

## 2018-09-24 ENCOUNTER — TELEPHONE (OUTPATIENT)
Dept: SLEEP MEDICINE | Facility: OTHER | Age: 63
End: 2018-09-24

## 2018-10-04 ENCOUNTER — HOSPITAL ENCOUNTER (OUTPATIENT)
Dept: SLEEP MEDICINE | Facility: OTHER | Age: 63
Discharge: HOME | End: 2018-10-04
Attending: PSYCHIATRY & NEUROLOGY
Payer: COMMERCIAL

## 2018-10-04 DIAGNOSIS — G47.30 SLEEP APNEA, UNSPECIFIED TYPE: ICD-10-CM

## 2018-10-04 DIAGNOSIS — G47.33 OSA (OBSTRUCTIVE SLEEP APNEA): ICD-10-CM

## 2018-10-04 PROCEDURE — 95800 SLP STDY UNATTENDED: CPT

## 2018-10-04 PROCEDURE — 95806 SLEEP STUDY UNATT&RESP EFFT: CPT | Mod: 26,,, | Performed by: PSYCHIATRY & NEUROLOGY

## 2018-10-05 ENCOUNTER — OFFICE VISIT (OUTPATIENT)
Dept: UROLOGY | Facility: CLINIC | Age: 63
End: 2018-10-05
Payer: COMMERCIAL

## 2018-10-05 VITALS — BODY MASS INDEX: 27.7 KG/M2 | WEIGHT: 209 LBS | HEIGHT: 73 IN

## 2018-10-05 DIAGNOSIS — N40.1 BENIGN PROSTATIC HYPERPLASIA WITH NOCTURIA: ICD-10-CM

## 2018-10-05 DIAGNOSIS — R35.1 BENIGN PROSTATIC HYPERPLASIA WITH NOCTURIA: ICD-10-CM

## 2018-10-05 DIAGNOSIS — R53.83 FATIGUE, UNSPECIFIED TYPE: ICD-10-CM

## 2018-10-05 DIAGNOSIS — R35.0 URINARY FREQUENCY: ICD-10-CM

## 2018-10-05 DIAGNOSIS — R79.89 LOW TESTOSTERONE: Primary | ICD-10-CM

## 2018-10-05 DIAGNOSIS — R35.1 NOCTURIA: ICD-10-CM

## 2018-10-05 PROCEDURE — 99214 OFFICE O/P EST MOD 30 MIN: CPT | Mod: S$GLB,,, | Performed by: UROLOGY

## 2018-10-05 PROCEDURE — 99999 PR PBB SHADOW E&M-EST. PATIENT-LVL III: CPT | Mod: PBBFAC,,, | Performed by: UROLOGY

## 2018-10-05 RX ORDER — TESTOSTERONE GEL, 1% 10 MG/G
5 GEL TRANSDERMAL DAILY
Qty: 150 G | Refills: 5 | Status: SHIPPED | OUTPATIENT
Start: 2018-10-05 | End: 2018-12-11 | Stop reason: CLARIF

## 2018-10-05 NOTE — PROGRESS NOTES
Subjective:       Patient ID: Paddy Nair is a 63 y.o. male.    Chief Complaint: Elevated PSA and Erectile Dysfunction    HPI  Review of Systems    Objective:      Physical Exam    Assessment:       No diagnosis found.    Plan:       ***

## 2018-10-05 NOTE — PROGRESS NOTES
Subjective:       Patient ID: Paddy Nair is a 63 y.o. male.    Chief Complaint: Elevated PSA and Erectile Dysfunction    Pt with history of elevated PSA and Low T. PSA is normal now. C/O of Fatigue and ED. NO trial of sildenafil yet as wife recently had pelvic surgery.  Desires to start TRT.      Erectile Dysfunction   This is a chronic problem. The current episode started more than 1 year ago. The problem has been waxing and waning since onset. The nature of his difficulty is achieving erection. He reports his erection duration to be less than 1 minute. Irritative symptoms include frequency (x7) and nocturia (x1). Irritative symptoms do not include urgency. Obstructive symptoms include dribbling, incomplete emptying and a slower stream. Obstructive symptoms do not include an intermittent stream, straining or a weak stream. Associated symptoms include hesitancy. Pertinent negatives include no chills, dysuria, genital pain, hematuria or inability to urinate. Nothing aggravates the symptoms. Past treatments include nothing. The treatment provided significant relief. He has been using treatment for 2 or more years. He has had no adverse reactions caused by medications. There are no known risk factors.     Review of Systems   Constitutional: Negative for activity change, appetite change, chills, diaphoresis, fatigue, fever and unexpected weight change.   HENT: Negative for congestion, hearing loss, sinus pressure and trouble swallowing.    Eyes: Negative for photophobia, pain, discharge and visual disturbance.   Respiratory: Negative for apnea, cough and shortness of breath.    Cardiovascular: Negative for chest pain, palpitations and leg swelling.   Gastrointestinal: Negative for abdominal distention, abdominal pain, anal bleeding, blood in stool, constipation, diarrhea, nausea, rectal pain and vomiting.   Endocrine: Negative for cold intolerance, heat intolerance, polydipsia, polyphagia and polyuria.    Genitourinary: Positive for frequency (x7), hesitancy, incomplete emptying and nocturia (x1). Negative for decreased urine volume, difficulty urinating, discharge, dysuria, enuresis, flank pain, genital sores, hematuria, penile pain, penile swelling, scrotal swelling, testicular pain and urgency.   Musculoskeletal: Negative for arthralgias, back pain and myalgias.   Skin: Negative for color change, pallor, rash and wound.   Allergic/Immunologic: Negative for environmental allergies, food allergies and immunocompromised state.   Neurological: Negative for dizziness, seizures, weakness and headaches.   Hematological: Negative for adenopathy. Does not bruise/bleed easily.   Psychiatric/Behavioral: Negative.        Objective:      Physical Exam   Nursing note and vitals reviewed.  Constitutional: He is oriented to person, place, and time. He appears well-developed and well-nourished.   HENT:   Head: Normocephalic.   Nose: Nose normal.   Mouth/Throat: Oropharynx is clear and moist.   Eyes: Conjunctivae and EOM are normal. Pupils are equal, round, and reactive to light.   Neck: Normal range of motion. Neck supple.   Cardiovascular: Normal rate, regular rhythm, normal heart sounds and intact distal pulses.    Pulmonary/Chest: Effort normal and breath sounds normal.   Abdominal: Soft. Bowel sounds are normal.   Genitourinary: Penis normal.   Musculoskeletal: Normal range of motion.   Neurological: He is alert and oriented to person, place, and time. He has normal reflexes.   Skin: Skin is warm and dry.     Psychiatric: He has a normal mood and affect. His behavior is normal. Judgment and thought content normal.       Assessment:       1. Low testosterone    2. Benign prostatic hyperplasia with nocturia    3. Urinary frequency    4. Nocturia    5. Fatigue, unspecified type        Plan:       Patient Instructions   Repeat  PSA in 6 months  TRT with Androgel  Repeat T level in 8 weeks. F/U  In 6 months.   Call with dose  adjustments as needed.

## 2018-10-09 ENCOUNTER — TELEPHONE (OUTPATIENT)
Dept: SLEEP MEDICINE | Facility: CLINIC | Age: 63
End: 2018-10-09

## 2018-10-09 NOTE — TELEPHONE ENCOUNTER
Patient updated on the results of the sleep test and scheduled for a follow up visit to the office to discuss results and plan of care.

## 2018-10-15 ENCOUNTER — OFFICE VISIT (OUTPATIENT)
Dept: SLEEP MEDICINE | Facility: CLINIC | Age: 63
End: 2018-10-15
Payer: COMMERCIAL

## 2018-10-15 VITALS
HEIGHT: 73 IN | BODY MASS INDEX: 27.91 KG/M2 | SYSTOLIC BLOOD PRESSURE: 102 MMHG | HEART RATE: 82 BPM | WEIGHT: 210.56 LBS | DIASTOLIC BLOOD PRESSURE: 60 MMHG

## 2018-10-15 DIAGNOSIS — G47.33 OSA (OBSTRUCTIVE SLEEP APNEA): Primary | ICD-10-CM

## 2018-10-15 PROCEDURE — 99999 PR PBB SHADOW E&M-EST. PATIENT-LVL III: CPT | Mod: PBBFAC,,, | Performed by: INTERNAL MEDICINE

## 2018-10-15 PROCEDURE — 99214 OFFICE O/P EST MOD 30 MIN: CPT | Mod: S$GLB,,, | Performed by: INTERNAL MEDICINE

## 2018-10-15 NOTE — PROGRESS NOTES
10/15/2018      Pt is following up in regards to the Home sleep study done for the GUILLERMO showing Mod GUILLERMO. He does feels tired and fatigue and has nocturia and denies drowsy driving. He does nap 12: 30 PM: - 130 PM and also 7: 30 - 8 : 20 and they are non refreshing he is nose breather and he does half a gallon of ice tea a day.       EPWORTH SLEEPINESS SCALE 10/9/2018   Sitting and reading 3   Watching TV 2   Sitting, inactive in a public place (e.g. a theatre or a meeting) 3   As a passenger in a car for an hour without a break 2   Lying down to rest in the afternoon when circumstances permit 3   Sitting and talking to someone 2   Sitting quietly after a lunch without alcohol 3   In a car, while stopped for a few minutes in traffic 0   Total score 18           HST: 10/04/2018  Mod GUILLERMO AHI 22,RDI: 33, < 90 Spo2: 2.6 %, Mean Spo2: 95%, time snorin.2%      2018   Paddy Nair  was seen at the request of  No ref. provider found for sleep evaluation.    2018 INITIAL HISTORY OF PRESENT ILLNESS:  Paddy Nair is a 63 y.o. male is here to be evaluated for a sleep disorder.       CHIEF COMPLAINT:      The patient's complaints include excessive daytime sleepiness, excessive daytime fatigue, snoring,  witnessed breathing pauses,  gasping for air in sleep and interrupted sleep since  10 years ago.      EPWORTH SLEEPINESS SCALE 2018   Sitting and reading 3   Watching TV 3   Sitting, inactive in a public place (e.g. a theatre or a meeting) 3   As a passenger in a car for an hour without a break 2   Lying down to rest in the afternoon when circumstances permit 3   Sitting and talking to someone 2   Sitting quietly after a lunch without alcohol 1   In a car, while stopped for a few minutes in traffic 0   Total score 17       SLEEP ROUTINE AND LIFESTYLE 10/15/2018 :  Sleep Clinic New Patient 2018   What time do you go to bed on a week day? (Give a range) 10 PM   What time do you go to bed on a  day off? (Give a range) 11 - 12 PM   How long does it take you to fall asleep? (Give a range) 10 - 20 minutes   On average, how many times per night do you wake up? 3   How long does it take you to fall back into sleep? (Give a range) 5 - 10 minutes   What time do you wake up to start your day on a week day? (Give a range) 5 AM   What time do you wake up to start your day on a day off? (Give a range) 7:30 - 8:00   What time do you get out of bed? (Give a range) 5:00 AM During the week, 7:30 - 8:00 on the weekend   On average, how many hours do you sleep? 6   On average, how many naps do you take per day? 0   Rate your sleep quality from 0 to 5 (0-poor, 5-great). 2   Have you experienced:  N/a   How much weight have you lost or gained (in lbs.) in the last year? 0   On average, how many times per night do you go to the bathroom?  2   Have you ever had a sleep study/CPAP machine/surgery for sleep apnea? No   Have you ever had a CPAP machine for sleep apnea? No   Have you ever had surgery for sleep apnea? No       Sleep Clinic ROS  9/16/2018   Difficulty breathing through the nose?  Sometimes   Sore throat or dry mouth in the morning? Sometimes   Irregular or very fast heart beat?  No   Shortness of breath?  No   Acid reflux? Yes   Body aches and pains?  Yes   Morning headaches? No   Dizziness? No   Mood changes?  No   Do you exercise?  Sometimes   Do you feel like moving your legs a lot?  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  Exercise routine: yes  Caffeine:   tea  ice     PREVIOUS SLEEP STUDIES:   None      DME:       PAST MEDICAL HISTORY:    Active Ambulatory Problems     Diagnosis Date Noted    Elevated PSA, less than 10 ng/ml 08/21/2018    Vasculogenic erectile dysfunction 08/21/2018    Low testosterone 08/21/2018    GUILLERMO  "(obstructive sleep apnea)     Benign prostatic hyperplasia with nocturia 10/05/2018    Urinary frequency 10/05/2018    Nocturia 10/05/2018    Fatigue 10/05/2018     Resolved Ambulatory Problems     Diagnosis Date Noted    No Resolved Ambulatory Problems     Past Medical History:   Diagnosis Date    Elevated PSA, less than 10 ng/ml 2018    Urinary tract infection                 PAST SURGICAL HISTORY:    No past surgical history on file.      FAMILY HISTORY:                Family History   Problem Relation Age of Onset    Prostate cancer Neg Hx     Kidney disease Neg Hx        SOCIAL HISTORY:          Tobacco:   Social History     Tobacco Use   Smoking Status Former Smoker    Types: Cigarettes    Last attempt to quit: 2017    Years since quittin.8   Smokeless Tobacco Never Used       alcohol use:    Social History     Substance and Sexual Activity   Alcohol Use Yes    Alcohol/week: 0.6 - 1.2 oz    Types: 1 - 2 Cans of beer per week    Comment: occ                   ALLERGIES:  Review of patient's allergies indicates:  No Known Allergies    CURRENT MEDICATIONS:    Current Outpatient Medications   Medication Sig Dispense Refill    sildenafil (VIAGRA) 100 MG tablet TAKE ONE TABLET BY MOUTH DAILY AS NEEDED FOR ERECTILE DYSFUNCTION. 10 tablet 0    tadalafil (CIALIS) 10 MG tablet Take 1 tablet (10 mg total) by mouth daily as needed for Erectile Dysfunction. 10 tablet 5    testosterone (ANDROGEL) 1 % (50 mg/5 gram) GlPk Apply 5 g topically once daily. 150 g 5     No current facility-administered medications for this visit.                         PHYSICAL EXAM:  /60   Pulse 82   Ht 6' 1" (1.854 m)   Wt 95.5 kg (210 lb 8.6 oz)   BMI 27.78 kg/m²   GENERAL: Normal development, well groomed.  GENERAL: Well groomed; Normally developed;  Physical Exam  Neck size: 18 inch  Oral: doll III- IV, high arch, mild over bite.  Nose: mild nasal congestion b/l  CVS: S1+S2 ,negative murmur/ " "gallop, regularly regular  Lungs: CTA B/L  Abdomen: BS+, no guarding, - rigidity.  Ext: negative pedal edema B/L LE             Using [facility]: yes      ASSESSMENT:    1. GUILLERMO. The patient symptomatically has  excessive daytime sleepiness, snoring,  witnessed breathing pauses, excessive daytime fatigue, gasping for air in sleep, interrupted sleep and nocturia  with exam findings of "a crowded oral airway and elevated body mass index. Underwent HST showing MOD GUILLERMO AHI 22  And ESS- 18 will suggest following for the treatment for the GUILLERMO.          PLAN:  GUILLERMO:  PAP therapy: Discussed the result of the PSG and started on Auto CPAP 5- 20 cm H20, referred to the [facility] for th formal mask fitting and advised avoiddrowsy driving. . Suggested the importance of compliance > 4 hrs. Informed about the opportunity to change mask  With in first 30 days .        Weight loss strategies were discussed in detail       Education:we talked about the etiology of  GUILLERMO as well as the potential ramifications of untreated sleep apnea, which could include daytime sleepiness, hypertension, heart disease and/or stroke.  We discussed potential treatment options, which could include weight loss, body positioning, continuous positive airway pressure (CPAP), or referral for surgical consideration. Meanwhile, he  is urged to avoid supine sleep, weight gain and alcoholic beverages since all of these can worsen GUILLERMO.     Precautions: The patient was advised to abstain from driving should he feel sleepy or drowsy.    Follow up: 3 months           "

## 2018-10-15 NOTE — PATIENT INSTRUCTIONS
Sleep Hygiene Practices    1. Try going to bed only when you are drowsy.    ?    2. If you are unable to fall asleep or stay asleep, leave your bedroom and engage in a quiet activity elsewhere. Do not permit yourself to fall asleep outside the bedroom. Return to bed when and only when you are sleepy. Repeat this process as often as necessary throughout night.    3. Maintain regular wake-up time, even on days off work & weekends    4. Use your bedroom for sleep and sex    5. Avoid napping during the daytime. If daytime sleepiness becomes overwhelming, limit nap time to a single nap of less than 1hr, no later than 3pm.    6. Distract your mind. Avoid clock watching. Lying in bed unable to sleep and frustrated needs to be avoided. Try reading or watching a videotape or listening to books on tape. It may be necessary to go into another room to do these.    7. Avoid caffeine within 4-6hrs of bedtime    8. Avoid use of nicotine close to bedtime    9. do not drink alcoholic beverages within 4-6hrs of bedtime    10. While a light snack before bedtime can help promote sound sleep, avoid large meals.    11. Obtain regular exercise, but avoid strenuous exercise within 4hrs of bedtime    12. Minimize light, noise, and extremes in temperature in the bedroom.    13. Precautions: The patient was advised to abstain from driving should they feel sleepy or drowsy.    [phone] OPTION 3 EXT 40427 . Chance  ?    * This information is provided had been directly obtained from the American [employer] wellness booklet on sleep hygiene. ([facility], [address] [place] [address]

## 2018-10-23 DIAGNOSIS — G47.33 OSA (OBSTRUCTIVE SLEEP APNEA): Primary | ICD-10-CM

## 2018-11-13 ENCOUNTER — CLINICAL SUPPORT (OUTPATIENT)
Dept: SMOKING CESSATION | Facility: CLINIC | Age: 63
End: 2018-11-13
Payer: COMMERCIAL

## 2018-11-13 DIAGNOSIS — F17.200 NICOTINE DEPENDENCE: Primary | ICD-10-CM

## 2018-11-13 PROCEDURE — 99407 BEHAV CHNG SMOKING > 10 MIN: CPT | Mod: S$GLB,,,

## 2018-11-13 NOTE — PROGRESS NOTES
Spoke with patients wife today in regard to smoking cessation progress for 12 month follow up, he is currently tobacco free as well as she is.  Congratulated patients on their quit episode. Informed wife of otheir benefit period, a follow and contact information if any further help or support is needed.  Completed and resolved  smart form for 12 month follow up on Quit .

## 2018-12-06 ENCOUNTER — PATIENT MESSAGE (OUTPATIENT)
Dept: UROLOGY | Facility: CLINIC | Age: 63
End: 2018-12-06

## 2018-12-10 ENCOUNTER — PATIENT MESSAGE (OUTPATIENT)
Dept: UROLOGY | Facility: CLINIC | Age: 63
End: 2018-12-10

## 2018-12-11 RX ORDER — TESTOSTERONE GEL, 1% 10 MG/G
GEL TRANSDERMAL DAILY
Qty: 150 G | Refills: 1 | Status: SHIPPED | OUTPATIENT
Start: 2018-12-11 | End: 2018-12-19 | Stop reason: SDUPTHER

## 2018-12-14 ENCOUNTER — TELEPHONE (OUTPATIENT)
Dept: UROLOGY | Facility: CLINIC | Age: 63
End: 2018-12-14

## 2018-12-19 RX ORDER — TESTOSTERONE GEL, 1% 10 MG/G
GEL TRANSDERMAL DAILY
Qty: 150 G | Refills: 5 | Status: SHIPPED | OUTPATIENT
Start: 2018-12-19 | End: 2019-06-28

## 2019-04-05 ENCOUNTER — OFFICE VISIT (OUTPATIENT)
Dept: UROLOGY | Facility: CLINIC | Age: 64
End: 2019-04-05
Payer: COMMERCIAL

## 2019-04-05 VITALS — HEIGHT: 73 IN | WEIGHT: 210.13 LBS | BODY MASS INDEX: 27.85 KG/M2

## 2019-04-05 DIAGNOSIS — N40.1 BENIGN PROSTATIC HYPERPLASIA WITH NOCTURIA: ICD-10-CM

## 2019-04-05 DIAGNOSIS — R35.1 NOCTURIA: ICD-10-CM

## 2019-04-05 DIAGNOSIS — R79.89 LOW TESTOSTERONE: Primary | ICD-10-CM

## 2019-04-05 DIAGNOSIS — N52.03 COMBINED ARTERIAL INSUFFICIENCY AND CORPORO-VENOUS OCCLUSIVE ERECTILE DYSFUNCTION: ICD-10-CM

## 2019-04-05 DIAGNOSIS — R35.1 BENIGN PROSTATIC HYPERPLASIA WITH NOCTURIA: ICD-10-CM

## 2019-04-05 DIAGNOSIS — R79.89 ABNORMAL LEVEL OF FEMALE SEX HORMONES: ICD-10-CM

## 2019-04-05 DIAGNOSIS — R97.20 ELEVATED PSA, LESS THAN 10 NG/ML: ICD-10-CM

## 2019-04-05 DIAGNOSIS — R35.0 URINARY FREQUENCY: ICD-10-CM

## 2019-04-05 PROCEDURE — 87070 CULTURE OTHR SPECIMN AEROBIC: CPT

## 2019-04-05 PROCEDURE — 87075 CULTR BACTERIA EXCEPT BLOOD: CPT

## 2019-04-05 PROCEDURE — 99999 PR PBB SHADOW E&M-EST. PATIENT-LVL III: CPT | Mod: PBBFAC,,, | Performed by: UROLOGY

## 2019-04-05 PROCEDURE — 99214 OFFICE O/P EST MOD 30 MIN: CPT | Mod: S$GLB,,, | Performed by: UROLOGY

## 2019-04-05 PROCEDURE — 87077 CULTURE AEROBIC IDENTIFY: CPT

## 2019-04-05 PROCEDURE — 99999 PR PBB SHADOW E&M-EST. PATIENT-LVL III: ICD-10-PCS | Mod: PBBFAC,,, | Performed by: UROLOGY

## 2019-04-05 PROCEDURE — 87186 SC STD MICRODIL/AGAR DIL: CPT

## 2019-04-05 PROCEDURE — 99214 PR OFFICE/OUTPT VISIT, EST, LEVL IV, 30-39 MIN: ICD-10-PCS | Mod: S$GLB,,, | Performed by: UROLOGY

## 2019-04-05 RX ORDER — ANASTROZOLE 1 MG/1
1 TABLET ORAL DAILY
Qty: 30 TABLET | Refills: 11 | Status: SHIPPED | OUTPATIENT
Start: 2019-04-05 | End: 2019-04-05 | Stop reason: SDUPTHER

## 2019-04-05 RX ORDER — SULFAMETHOXAZOLE AND TRIMETHOPRIM 800; 160 MG/1; MG/1
1 TABLET ORAL 2 TIMES DAILY
Qty: 28 TABLET | Refills: 1 | Status: SHIPPED | OUTPATIENT
Start: 2019-04-05 | End: 2019-05-27

## 2019-04-05 RX ORDER — ANASTROZOLE 1 MG/1
1 TABLET ORAL DAILY
Qty: 30 TABLET | Refills: 11 | Status: SHIPPED | OUTPATIENT
Start: 2019-04-05 | End: 2019-09-19

## 2019-04-05 NOTE — PATIENT INSTRUCTIONS
Add Arimidex 1 mg  Daily  Continue Androgel  Check PSA , T and EST level in 3 month  F/U 6 months with T, Est and PSA levels  C+S of right groin drainage  Bactrim DS x 2 weeks

## 2019-04-05 NOTE — PROGRESS NOTES
Subjective:       Patient ID: Paddy Nair is a 63 y.o. male.    Chief Complaint: Other (6 mons f/u)    64 yo WM with Low T and Elevated Est and hx of elevated PSA. Sx improved as far as decreased fatigue and somewhat improve erections but T level is still very low. Pt here for lab review, refills and evaluation. Also wti Stable LUTS and BPH. On CPAP      Benign Prostatic Hypertrophy   This is a chronic problem. The current episode started more than 1 year ago. The problem has been gradually improving since onset. Irritative symptoms include frequency (x7-8) and nocturia (x1). Irritative symptoms do not include urgency. Obstructive symptoms include dribbling, straining (in the am) and a weak stream. Obstructive symptoms do not include incomplete emptying, an intermittent stream or a slower stream. Associated symptoms include hesitancy (in am). Pertinent negatives include no chills, dysuria, genital pain, hematuria, nausea or vomiting. AUA score is 8-19. He is sexually active. Exacerbated by: Tea. Treatments tried: Cialis. The treatment provided moderate relief. He has been using treatment for 2 or more years.   Erectile Dysfunction   This is a chronic problem. The current episode started more than 1 year ago. The problem has been gradually improving since onset. The nature of his difficulty is maintaining erection. He reports no anxiety, decreased libido or performance anxiety. He reports his erection duration to be 5 to 10 minutes. Irritative symptoms include frequency (x7-8) and nocturia (x1). Irritative symptoms do not include urgency. Obstructive symptoms include dribbling, straining (in the am) and a weak stream. Obstructive symptoms do not include incomplete emptying, an intermittent stream or a slower stream. Associated symptoms include hesitancy (in am). Pertinent negatives include no chills, dysuria, genital pain, hematuria or inability to urinate. Nothing aggravates the symptoms. Past treatments  include tadalafil and sildenafil. The treatment provided mild relief. He has been using treatment for 2 or more years. He has had no adverse reactions caused by medications. Risk factors include BPH.     Review of Systems   Constitutional: Negative for activity change, appetite change, chills, diaphoresis, fatigue, fever and unexpected weight change.   HENT: Negative for congestion, hearing loss, sinus pressure and trouble swallowing.    Eyes: Negative for photophobia, pain, discharge and visual disturbance.   Respiratory: Negative for apnea, cough and shortness of breath.    Cardiovascular: Negative for chest pain, palpitations and leg swelling.   Gastrointestinal: Negative for abdominal distention, abdominal pain, anal bleeding, blood in stool, constipation, diarrhea, nausea, rectal pain and vomiting.   Endocrine: Negative for cold intolerance, heat intolerance, polydipsia, polyphagia and polyuria.   Genitourinary: Positive for frequency (x7-8), hesitancy (in am) and nocturia (x1). Negative for decreased libido, decreased urine volume, difficulty urinating, discharge, dysuria, enuresis, flank pain, genital sores, hematuria, incomplete emptying, penile pain, penile swelling, scrotal swelling, testicular pain and urgency.   Musculoskeletal: Negative for arthralgias, back pain and myalgias.   Skin: Negative for color change, pallor, rash and wound.   Allergic/Immunologic: Negative for environmental allergies, food allergies and immunocompromised state.   Neurological: Negative for dizziness, seizures, weakness and headaches.   Hematological: Negative for adenopathy. Does not bruise/bleed easily.   Psychiatric/Behavioral: Negative.  The patient is not nervous/anxious.        Objective:      Physical Exam   Nursing note and vitals reviewed.  Constitutional: He is oriented to person, place, and time. He appears well-developed and well-nourished.   HENT:   Head: Normocephalic.   Nose: Nose normal.   Mouth/Throat:  Oropharynx is clear and moist.   Eyes: Conjunctivae and EOM are normal. Pupils are equal, round, and reactive to light.   Neck: Normal range of motion. Neck supple.   Cardiovascular: Normal rate, regular rhythm, normal heart sounds and intact distal pulses.    Pulmonary/Chest: Effort normal and breath sounds normal.   Abdominal: Soft. Bowel sounds are normal.   Genitourinary: Rectum normal, testes normal and penis normal. Prostate is enlarged. Prostate is not tender. Circumcised.   Genitourinary Comments: Draining folliculitis/epidermal inclusion cyst in Right groin crease lateral to scrotum.   Musculoskeletal: Normal range of motion.   Neurological: He is alert and oriented to person, place, and time. He has normal reflexes.   Skin: Skin is warm and dry.     Psychiatric: He has a normal mood and affect. His behavior is normal. Judgment and thought content normal.       Assessment:       1. Low testosterone    2. Abnormal level of female sex hormones    3. Elevated PSA, less than 10 ng/ml    4. Benign prostatic hyperplasia with nocturia    5. Nocturia    6. Urinary frequency    7. Combined arterial insufficiency and corporo-venous occlusive erectile dysfunction        Plan:       Patient Instructions   Add Arimidex 1 mg  Daily  Continue Androgel  Check PSA , T and EST level in 3 month  F/U 6 months with T, Est and PSA levels  C+S of right groin drainage  Bactrim DS x 2 weeks  Low testosterone  -     PSA, total and free; Future; Expected date: 07/05/2019  -     Testosterone; Future; Expected date: 07/05/2019  -     Estrogens, total; Future; Expected date: 07/05/2019    Abnormal level of female sex hormones  -     PSA, total and free; Future; Expected date: 07/05/2019  -     Testosterone; Future; Expected date: 07/05/2019  -     Estrogens, total; Future; Expected date: 07/05/2019    Elevated PSA, less than 10 ng/ml  -     PSA, total and free; Future; Expected date: 07/05/2019  -     Testosterone; Future; Expected  date: 07/05/2019  -     Estrogens, total; Future; Expected date: 07/05/2019    Benign prostatic hyperplasia with nocturia    Nocturia    Urinary frequency    Combined arterial insufficiency and corporo-venous occlusive erectile dysfunction    Other orders  -     Discontinue: anastrozole (ARIMIDEX) 1 mg Tab; Take 1 tablet (1 mg total) by mouth once daily.  Dispense: 30 tablet; Refill: 11  -     anastrozole (ARIMIDEX) 1 mg Tab; Take 1 tablet (1 mg total) by mouth once daily.  Dispense: 30 tablet; Refill: 11  -     sulfamethoxazole-trimethoprim 800-160mg (BACTRIM DS) 800-160 mg Tab; Take 1 tablet by mouth 2 (two) times daily. for 14 days  Dispense: 28 tablet; Refill: 1

## 2019-04-08 ENCOUNTER — OFFICE VISIT (OUTPATIENT)
Dept: SLEEP MEDICINE | Facility: CLINIC | Age: 64
End: 2019-04-08
Payer: COMMERCIAL

## 2019-04-08 VITALS
BODY MASS INDEX: 27.85 KG/M2 | SYSTOLIC BLOOD PRESSURE: 120 MMHG | DIASTOLIC BLOOD PRESSURE: 76 MMHG | HEIGHT: 73 IN | WEIGHT: 210.13 LBS | HEART RATE: 77 BPM

## 2019-04-08 DIAGNOSIS — G47.33 OSA (OBSTRUCTIVE SLEEP APNEA): Primary | ICD-10-CM

## 2019-04-08 LAB — BACTERIA SPEC AEROBE CULT: NORMAL

## 2019-04-08 PROCEDURE — 99999 PR PBB SHADOW E&M-EST. PATIENT-LVL III: CPT | Mod: PBBFAC,,, | Performed by: INTERNAL MEDICINE

## 2019-04-08 PROCEDURE — 99999 PR PBB SHADOW E&M-EST. PATIENT-LVL III: ICD-10-PCS | Mod: PBBFAC,,, | Performed by: INTERNAL MEDICINE

## 2019-04-08 PROCEDURE — 99214 PR OFFICE/OUTPT VISIT, EST, LEVL IV, 30-39 MIN: ICD-10-PCS | Mod: S$GLB,,, | Performed by: INTERNAL MEDICINE

## 2019-04-08 PROCEDURE — 99214 OFFICE O/P EST MOD 30 MIN: CPT | Mod: S$GLB,,, | Performed by: INTERNAL MEDICINE

## 2019-04-08 NOTE — PATIENT INSTRUCTIONS
CONTOUR CPAP PILLOW 2.0    suggested to use remzz's mask liner for leaks  (RemZzzs Padded CPAP Mask Liners)      Sleep Hygiene Practices    1. Try going to bed only when you are drowsy.    ?    2. If you are unable to fall asleep or stay asleep, leave your bedroom and engage in a quiet activity elsewhere. Do not permit yourself to fall asleep outside the bedroom. Return to bed when and only when you are sleepy. Repeat this process as often as necessary throughout night.    3. Maintain regular wake-up time, even on days off work & weekends    4. Use your bedroom for sleep and sex    5. Avoid napping during the daytime. If daytime sleepiness becomes overwhelming, limit nap time to a single nap of less than 1hr, no later than 3pm.    6. Distract your mind. Avoid clock watching. Lying in bed unable to sleep and frustrated needs to be avoided. Try reading or watching a videotape or listening to books on tape. It may be necessary to go into another room to do these.    7. Avoid caffeine within 4-6hrs of bedtime    8. Avoid use of nicotine close to bedtime    9. do not drink alcoholic beverages within 4-6hrs of bedtime    10. While a light snack before bedtime can help promote sound sleep, avoid large meals.    11. Obtain regular exercise, but avoid strenuous exercise within 4hrs of bedtime    12. Minimize light, noise, and extremes in temperature in the bedroom.    13. Precautions: The patient was advised to abstain from driving should they feel sleepy or drowsy.  ?    * This information is provided had been directly obtained from the American Academy of [employer] wellness booklet on sleep hygiene. ([facility], [address] [place] [address]

## 2019-04-08 NOTE — PROGRESS NOTES
2019    Pt is following in regards to the GUILLERMO and having benefit of the therapy and feel some time the mask leaks when lays on the side. Rarely dry mouth. Denies bloating, he denies any morning headache, feel fresh on waking up. He denies drowsy driving. He still continue to drink quarter of a gallon ice tea. .        DME: [facility]  Mask: Armara  [employer] Auto CPAP- 5- 20 CM h20   Humidity: 3  Therapy hrs: 1195.4  Blow hrs: 1201.11  > 4 days :   Avg Hrs: 7.4  Leaks: 75%  AHI: 13.4   PD: 2%  Avg : 9.1 cm H20     EPWORTH SLEEPINESS SCALE 2019   Sitting and reading 2   Watching TV 1   Sitting, inactive in a public place (e.g. a theatre or a meeting) 1   As a passenger in a car for an hour without a break 2   Lying down to rest in the afternoon when circumstances permit 2   Sitting and talking to someone 1   Sitting quietly after a lunch without alcohol 2   In a car, while stopped for a few minutes in traffic 0   Total score 11           10/15/2018      Pt is following up in regards to the Home sleep study done for the GUILLERMO showing Mod GUILLERMO. He does feels tired and fatigue and has nocturia and denies drowsy driving. He does nap 12: 30 PM: - 130 PM and also 7: 30 - 8 : 20 and they are non refreshing he is nose breather and he does half a gallon of ice tea a day.       EPWORTH SLEEPINESS SCALE 10/9/2018   Sitting and reading 3   Watching TV 2   Sitting, inactive in a public place (e.g. a theatre or a meeting) 3   As a passenger in a car for an hour without a break 2   Lying down to rest in the afternoon when circumstances permit 3   Sitting and talking to someone 2   Sitting quietly after a lunch without alcohol 3   In a car, while stopped for a few minutes in traffic 0   Total score 18           HST: 10/04/2018  Mod GUILLERMO AHI 22,RDI: 33, < 90 Spo2: 2.6 %, Mean Spo2: 95%, time snorin.2%      2018   Paddy Nair  was seen at the request of  No ref. provider found for sleep evaluation.    2018  INITIAL HISTORY OF PRESENT ILLNESS:  Paddy Nair is a 63 y.o. male is here to be evaluated for a sleep disorder.       CHIEF COMPLAINT:      The patient's complaints include excessive daytime sleepiness, excessive daytime fatigue, snoring,  witnessed breathing pauses,  gasping for air in sleep and interrupted sleep since  10 years ago.      EPWORTH SLEEPINESS SCALE 9/16/2018   Sitting and reading 3   Watching TV 3   Sitting, inactive in a public place (e.g. a theatre or a meeting) 3   As a passenger in a car for an hour without a break 2   Lying down to rest in the afternoon when circumstances permit 3   Sitting and talking to someone 2   Sitting quietly after a lunch without alcohol 1   In a car, while stopped for a few minutes in traffic 0   Total score 17       SLEEP ROUTINE AND LIFESTYLE 04/08/2019 :  Sleep Clinic New Patient 9/16/2018   What time do you go to bed on a week day? (Give a range) 10 PM   What time do you go to bed on a day off? (Give a range) 11 - 12 PM   How long does it take you to fall asleep? (Give a range) 10 - 20 minutes   On average, how many times per night do you wake up? 3   How long does it take you to fall back into sleep? (Give a range) 5 - 10 minutes   What time do you wake up to start your day on a week day? (Give a range) 5 AM   What time do you wake up to start your day on a day off? (Give a range) 7:30 - 8:00   What time do you get out of bed? (Give a range) 5:00 AM During the week, 7:30 - 8:00 on the weekend   On average, how many hours do you sleep? 6   On average, how many naps do you take per day? 0   Rate your sleep quality from 0 to 5 (0-poor, 5-great). 2   Have you experienced:  N/a   How much weight have you lost or gained (in lbs.) in the last year? 0   On average, how many times per night do you go to the bathroom?  2   Have you ever had a sleep study/CPAP machine/surgery for sleep apnea? No   Have you ever had a CPAP machine for sleep apnea? No   Have you  ever had surgery for sleep apnea? No       Sleep Clinic ROS  2018   Difficulty breathing through the nose?  Sometimes   Sore throat or dry mouth in the morning? Sometimes   Irregular or very fast heart beat?  No   Shortness of breath?  No   Acid reflux? Yes   Body aches and pains?  Yes   Morning headaches? No   Dizziness? No   Mood changes?  No   Do you exercise?  Sometimes   Do you feel like moving your legs a lot?  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  Exercise routine: yes  Caffeine:   tea  ice     PREVIOUS SLEEP STUDIES:   None      DME:       PAST MEDICAL HISTORY:    Active Ambulatory Problems     Diagnosis Date Noted    Elevated PSA, less than 10 ng/ml 2018    Vasculogenic erectile dysfunction 2018    Low testosterone 2018    GUILLERMO (obstructive sleep apnea)     Benign prostatic hyperplasia with nocturia 10/05/2018    Urinary frequency 10/05/2018    Nocturia 10/05/2018    Fatigue 10/05/2018    Abnormal level of female sex hormones 2019     Resolved Ambulatory Problems     Diagnosis Date Noted    No Resolved Ambulatory Problems     Past Medical History:   Diagnosis Date    Elevated PSA, less than 10 ng/ml 2018    Urinary tract infection                 PAST SURGICAL HISTORY:    No past surgical history on file.      FAMILY HISTORY:                Family History   Problem Relation Age of Onset    Prostate cancer Neg Hx     Kidney disease Neg Hx        SOCIAL HISTORY:          Tobacco:   Social History     Tobacco Use   Smoking Status Former Smoker    Types: Cigarettes    Last attempt to quit: 2017    Years since quittin.3   Smokeless Tobacco Never Used       alcohol use:    Social History     Substance and Sexual Activity   Alcohol Use Yes    Alcohol/week: 0.6 - 1.2 oz  "   Types: 1 - 2 Cans of beer per week    Comment: occ                   ALLERGIES:  Review of patient's allergies indicates:  No Known Allergies    CURRENT MEDICATIONS:    Current Outpatient Medications   Medication Sig Dispense Refill    anastrozole (ARIMIDEX) 1 mg Tab Take 1 tablet (1 mg total) by mouth once daily. 30 tablet 11    sildenafil (VIAGRA) 100 MG tablet TAKE ONE TABLET BY MOUTH DAILY AS NEEDED FOR ERECTILE DYSFUNCTION. 10 tablet 0    sulfamethoxazole-trimethoprim 800-160mg (BACTRIM DS) 800-160 mg Tab Take 1 tablet by mouth 2 (two) times daily. for 14 days 28 tablet 1    tadalafil (CIALIS) 10 MG tablet Take 1 tablet (10 mg total) by mouth daily as needed for Erectile Dysfunction. 10 tablet 5    testosterone (ANDROGEL) 1 % (50 mg/5 gram) GlPk Apply  1 packet topically once daily. 150 g 5     No current facility-administered medications for this visit.                         PHYSICAL EXAM:  /76   Pulse 77   Ht 6' 1" (1.854 m)   Wt 95.3 kg (210 lb 1.6 oz)   BMI 27.72 kg/m²   GENERAL: Well groomed; Normally developed;  Physical Exam  Neck size: 18 inch  Oral: doll III- IV, high arch, mild over bite.  Nose: mild nasal congestion b/l  CVS: S1+S2 ,negative murmur/ gallop, regularly regular  Lungs: CTA B/L  Abdomen: BS+, no guarding, - rigidity.  Ext: negative pedal edema B/L LE             Using [facility]: yes      ASSESSMENT:    1. GUILLERMO. The patient symptomatically has  excessive daytime sleepiness, snoring,  witnessed breathing pauses, excessive daytime fatigue, gasping for air in sleep, interrupted sleep and nocturia  with exam findings of "a crowded oral airway and elevated body mass index. Underwent HST showing MOD GUILLERMO AHI 22  And ESS- 11, > 4 hrs 30/30 and residual AHI 13.4  will suggest following for the treatment for the GUILLERMO.          PLAN:  GUILLERMO:  PAP therapy: Discussed the result of the compliance  and switched pressure to   Auto CPAP 5- 12 cm H20, referred to the DME  To enroll in " encore in order to access the data. And suggested to use remZZ'S for the leaks and to use the CPAP pillow for the comfort and will closely  Follow. Weight loss strategies were discussed in detail  And suggested to cut down ice tea.       Education:we talked about the etiology of  GUILLERMO as well as the potential ramifications of untreated sleep apnea, which could include daytime sleepiness, hypertension, heart disease and/or stroke.  We discussed potential treatment options, which could include weight loss, body positioning, continuous positive airway pressure (CPAP), or referral for surgical consideration. Meanwhile, he  is urged to avoid supine sleep, weight gain and alcoholic beverages since all of these can worsen GUILLERMO.     Precautions: The patient was advised to abstain from driving should he feel sleepy or drowsy.    Follow up: 3 months with grace Galloway MD, FACP  Phone: [phone]  Fax: [phone]

## 2019-04-09 LAB — BACTERIA SPEC ANAEROBE CULT: NORMAL

## 2019-06-28 NOTE — TELEPHONE ENCOUNTER
----- Message from Stacy WOODVioletta Chilango sent at 6/28/2019  3:22 PM CDT -----  Contact: Lizette bartlett/ [facility] [phone]  REFILLS:    HAVE YOU CONTACTED YOUR PHARMACY ABOUT THIS REFILL? Y/N    If NO, please have your pharmacy send your request electronically.    REFILLS:    Patient is requesting a medication refill.    RX name: testosterone (ANDROGEL) 1 % (50 mg/5 gram) GlPk    Strength: 1 % (50 mg/5 gram) GlPk    Directions: Apply  1 packet topically once daily    Pharmacy name: [facility]    Phone number where pt can be reached: [phone]

## 2019-07-01 ENCOUNTER — TELEPHONE (OUTPATIENT)
Dept: UROLOGY | Facility: CLINIC | Age: 64
End: 2019-07-01

## 2019-07-01 ENCOUNTER — PATIENT MESSAGE (OUTPATIENT)
Dept: UROLOGY | Facility: CLINIC | Age: 64
End: 2019-07-01

## 2019-07-02 RX ORDER — TESTOSTERONE GEL, 1% 10 MG/G
GEL TRANSDERMAL DAILY
Qty: 150 G | Refills: 5 | Status: ON HOLD | OUTPATIENT
Start: 2019-07-02 | End: 2019-07-22 | Stop reason: HOSPADM

## 2019-07-08 ENCOUNTER — PATIENT MESSAGE (OUTPATIENT)
Dept: UROLOGY | Facility: CLINIC | Age: 64
End: 2019-07-08

## 2019-07-09 ENCOUNTER — PATIENT MESSAGE (OUTPATIENT)
Dept: UROLOGY | Facility: CLINIC | Age: 64
End: 2019-07-09

## 2019-07-15 ENCOUNTER — PATIENT MESSAGE (OUTPATIENT)
Dept: UROLOGY | Facility: CLINIC | Age: 64
End: 2019-07-15

## 2019-07-15 ENCOUNTER — TELEPHONE (OUTPATIENT)
Dept: UROLOGY | Facility: CLINIC | Age: 64
End: 2019-07-15

## 2019-07-15 DIAGNOSIS — R97.20 ELEVATED PSA: Primary | ICD-10-CM

## 2019-07-15 RX ORDER — SODIUM CHLORIDE 9 MG/ML
INJECTION, SOLUTION INTRAVENOUS CONTINUOUS
Status: CANCELLED | OUTPATIENT
Start: 2019-07-15

## 2019-07-15 RX ORDER — CIPROFLOXACIN 2 MG/ML
400 INJECTION, SOLUTION INTRAVENOUS
Status: CANCELLED | OUTPATIENT
Start: 2019-07-15

## 2019-07-15 NOTE — TELEPHONE ENCOUNTER
----- Message from Gagandeep Carrillo MD sent at 7/15/2019  8:31 AM CDT -----  Notify Pt that PSA is elevated and % free PSA is too high. I recommend prostate biopsy. We can schedule if he agrees.

## 2019-07-19 ENCOUNTER — PATIENT MESSAGE (OUTPATIENT)
Dept: UROLOGY | Facility: CLINIC | Age: 64
End: 2019-07-19

## 2019-07-19 ENCOUNTER — TELEPHONE (OUTPATIENT)
Dept: UROLOGY | Facility: CLINIC | Age: 64
End: 2019-07-19

## 2019-07-19 NOTE — TELEPHONE ENCOUNTER
----- Message from Nuha Nair sent at 7/19/2019  3:15 PM CDT -----  Contact: patient/[phone]  Patient needs a call back regarding the enema.    Please call [phone] to discuss immediately today as his procedure is scheduled for Monday.

## 2019-07-19 NOTE — TELEPHONE ENCOUNTER
----- Message from Krunal Auguste sent at 7/19/2019  8:47 AM CDT -----  Contact: [phone]/SELF  Patient calling to speak with you about his upcoming procedure   Please call back to assist at [phone]

## 2019-07-22 PROBLEM — R97.20 ELEVATED PSA: Status: ACTIVE | Noted: 2019-07-22

## 2019-07-23 ENCOUNTER — OFFICE VISIT (OUTPATIENT)
Dept: SLEEP MEDICINE | Facility: CLINIC | Age: 64
End: 2019-07-23
Payer: COMMERCIAL

## 2019-07-23 VITALS
DIASTOLIC BLOOD PRESSURE: 76 MMHG | HEART RATE: 67 BPM | SYSTOLIC BLOOD PRESSURE: 115 MMHG | WEIGHT: 211.63 LBS | BODY MASS INDEX: 28.66 KG/M2 | HEIGHT: 72 IN

## 2019-07-23 DIAGNOSIS — G47.33 OSA (OBSTRUCTIVE SLEEP APNEA): Primary | ICD-10-CM

## 2019-07-23 PROCEDURE — 99213 OFFICE O/P EST LOW 20 MIN: CPT | Mod: S$GLB,,, | Performed by: INTERNAL MEDICINE

## 2019-07-23 PROCEDURE — 99999 PR PBB SHADOW E&M-EST. PATIENT-LVL III: CPT | Mod: PBBFAC,,, | Performed by: INTERNAL MEDICINE

## 2019-07-23 PROCEDURE — 99999 PR PBB SHADOW E&M-EST. PATIENT-LVL III: ICD-10-PCS | Mod: PBBFAC,,, | Performed by: INTERNAL MEDICINE

## 2019-07-23 PROCEDURE — 99213 PR OFFICE/OUTPT VISIT, EST, LEVL III, 20-29 MIN: ICD-10-PCS | Mod: S$GLB,,, | Performed by: INTERNAL MEDICINE

## 2019-07-23 NOTE — PROGRESS NOTES
07/23/19    Pt is following in regards to the GUILLERMO and feel the benefit of the therapy and does not feel tirted and leaks have improved and tired to use the remzzz's but did not help. He denies headache in morning. He denies drowsy driving. He denies bloating on waking. He feels the mode is fine and memory is fine. He has nocturia once a night. He denies any palpitation.       DME= [facility]  Mask : ffm  Auto CPAP 5- 12 CM h20   Compliance Summary    Days with Device Usage:  90 days    Percentage of Days >=4 Hours:  98.9%    Average Usage (Days Used):  7 hrs. 32 mins. 23 secs.    Average Usage (All Days):  7 hrs. 32 mins. 23 secs.      Apnea Indices    Average AHI:  7.7    Average OA Index:  1.9    Average CA Index:  0.6          Ventilator Statistics    Average Breath Rate:  14.9 bpm    Average % Patient Triggered Breaths:  N/A    Average Tidal Volume:  414.1 ml    Average Minute Vent:  N/A      Large Leak    Average Time in Large Leak:  19 mins. 7 secs.    Average % of Night in Large Leak:  4.2%          Periodic Breathing    Average % of Night in PB:  1.7%          EPWORTH SLEEPINESS SCALE 7/22/2019   Sitting and reading 1   Watching TV 1   Sitting, inactive in a public place (e.g. a theatre or a meeting) 1   As a passenger in a car for an hour without a break 1   Lying down to rest in the afternoon when circumstances permit 1   Sitting and talking to someone 0   Sitting quietly after a lunch without alcohol 0   In a car, while stopped for a few minutes in traffic 0   Total score 5     4/08/2019    Pt is following in regards to the GUILLERMO and having benefit of the therapy and feel some time the mask leaks when lays on the side. Rarely dry mouth. Denies bloating, he denies any morning headache, feel fresh on waking up. He denies drowsy driving. He still continue to drink quarter of a gallon ice tea. .        DME: [facility]  Mask: Armara  [employer] Auto CPAP- 5- 20 CM h20   Humidity: 3  Therapy hrs: 1195.4  Blow hrs:  1201.11  > 4 days :   Avg Hrs: 7.4  Leaks: 75%  AHI: 13.4   PD: 2%  Avg : 9.1 cm H20     EPWORTH SLEEPINESS SCALE 2019   Sitting and reading 2   Watching TV 1   Sitting, inactive in a public place (e.g. a theatre or a meeting) 1   As a passenger in a car for an hour without a break 2   Lying down to rest in the afternoon when circumstances permit 2   Sitting and talking to someone 1   Sitting quietly after a lunch without alcohol 2   In a car, while stopped for a few minutes in traffic 0   Total score 11           10/15/2018      Pt is following up in regards to the Home sleep study done for the GUILLERMO showing Mod GUILLERMO. He does feels tired and fatigue and has nocturia and denies drowsy driving. He does nap 12: 30 PM: - 130 PM and also 7: 30 - 8 : 20 and they are non refreshing he is nose breather and he does half a gallon of ice tea a day.       EPWORTH SLEEPINESS SCALE 10/9/2018   Sitting and reading 3   Watching TV 2   Sitting, inactive in a public place (e.g. a theatre or a meeting) 3   As a passenger in a car for an hour without a break 2   Lying down to rest in the afternoon when circumstances permit 3   Sitting and talking to someone 2   Sitting quietly after a lunch without alcohol 3   In a car, while stopped for a few minutes in traffic 0   Total score 18           HST: 10/04/2018  Mod GUILLERMO AHI 22,RDI: 33, < 90 Spo2: 2.6 %, Mean Spo2: 95%, time snorin.2%      2018   Paddy Nair  was seen at the request of  No ref. provider found for sleep evaluation.    2018 INITIAL HISTORY OF PRESENT ILLNESS:  Paddy Nair is a 63 y.o. male is here to be evaluated for a sleep disorder.       CHIEF COMPLAINT:      The patient's complaints include excessive daytime sleepiness, excessive daytime fatigue, snoring,  witnessed breathing pauses,  gasping for air in sleep and interrupted sleep since  10 years ago.      EPWORTH SLEEPINESS SCALE 2018   Sitting and reading 3   Watching TV 3    Sitting, inactive in a public place (e.g. a theatre or a meeting) 3   As a passenger in a car for an hour without a break 2   Lying down to rest in the afternoon when circumstances permit 3   Sitting and talking to someone 2   Sitting quietly after a lunch without alcohol 1   In a car, while stopped for a few minutes in traffic 0   Total score 17       SLEEP ROUTINE AND LIFESTYLE 07/23/2019 :  Sleep Clinic New Patient 9/16/2018   What time do you go to bed on a week day? (Give a range) 10 PM   What time do you go to bed on a day off? (Give a range) 11 - 12 PM   How long does it take you to fall asleep? (Give a range) 10 - 20 minutes   On average, how many times per night do you wake up? 3   How long does it take you to fall back into sleep? (Give a range) 5 - 10 minutes   What time do you wake up to start your day on a week day? (Give a range) 5 AM   What time do you wake up to start your day on a day off? (Give a range) 7:30 - 8:00   What time do you get out of bed? (Give a range) 5:00 AM During the week, 7:30 - 8:00 on the weekend   On average, how many hours do you sleep? 6   On average, how many naps do you take per day? 0   Rate your sleep quality from 0 to 5 (0-poor, 5-great). 2   Have you experienced:  N/a   How much weight have you lost or gained (in lbs.) in the last year? 0   On average, how many times per night do you go to the bathroom?  2   Have you ever had a sleep study/CPAP machine/surgery for sleep apnea? No   Have you ever had a CPAP machine for sleep apnea? No   Have you ever had surgery for sleep apnea? No       Sleep Clinic ROS  9/16/2018   Difficulty breathing through the nose?  Sometimes   Sore throat or dry mouth in the morning? Sometimes   Irregular or very fast heart beat?  No   Shortness of breath?  No   Acid reflux? Yes   Body aches and pains?  Yes   Morning headaches? No   Dizziness? No   Mood changes?  No   Do you exercise?  Sometimes   Do you feel like moving your legs a lot?   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  Exercise routine: yes  Caffeine:   tea  ice     PREVIOUS SLEEP STUDIES:   None      DME:       PAST MEDICAL HISTORY:    Active Ambulatory Problems     Diagnosis Date Noted    Elevated PSA, less than 10 ng/ml 2018    Vasculogenic erectile dysfunction 2018    Low testosterone 2018    GUILLERMO (obstructive sleep apnea)     Benign prostatic hyperplasia with nocturia 10/05/2018    Urinary frequency 10/05/2018    Nocturia 10/05/2018    Fatigue 10/05/2018    Abnormal level of female sex hormones 2019    Elevated PSA 2019     Resolved Ambulatory Problems     Diagnosis Date Noted    No Resolved Ambulatory Problems     Past Medical History:   Diagnosis Date    Elevated PSA, less than 10 ng/ml 2018    Urinary tract infection                 PAST SURGICAL HISTORY:    No past surgical history on file.      FAMILY HISTORY:                Family History   Problem Relation Age of Onset    Prostate cancer Neg Hx     Kidney disease Neg Hx        SOCIAL HISTORY:          Tobacco:   Social History     Tobacco Use   Smoking Status Former Smoker    Types: Cigarettes    Last attempt to quit: 2017    Years since quittin.6   Smokeless Tobacco Never Used       alcohol use:    Social History     Substance and Sexual Activity   Alcohol Use Yes    Alcohol/week: 0.6 - 1.2 oz    Types: 1 - 2 Cans of beer per week    Comment: occ                   ALLERGIES:  Review of patient's allergies indicates:  No Known Allergies    CURRENT MEDICATIONS:    Current Outpatient Medications   Medication Sig Dispense Refill    anastrozole (ARIMIDEX) 1 mg Tab Take 1 tablet (1 mg total) by mouth once daily. 30 tablet 11    ciprofloxacin HCl (CIPRO) 500 MG tablet Take 1 tablet (500 mg  total) by mouth 2 (two) times daily. for 5 days 10 tablet 0    sildenafil (VIAGRA) 100 MG tablet TAKE ONE TABLET BY MOUTH DAILY AS NEEDED FOR ERECTILE DYSFUNCTION. 10 tablet 0    tadalafil (CIALIS) 10 MG tablet Take 1 tablet (10 mg total) by mouth daily as needed for Erectile Dysfunction. 10 tablet 5    HYDROcodone-acetaminophen (NORCO) 5-325 mg per tablet Take 1 tablet by mouth every 6 (six) hours as needed for Pain. 10 tablet 0    tamsulosin (FLOMAX) 0.4 mg Cap Take 1 capsule (0.4 mg total) by mouth every evening. 30 capsule 11     No current facility-administered medications for this visit.                         PHYSICAL EXAM:  Vitals:    07/23/19 0829   BP: 115/76   Pulse: 67     GENERAL: Well groomed; Normally developed;  Physical Exam  Neck size: 18 inch  Oral: doll III- IV, high arch, mild over bite.  Nose: mild nasal congestion b/l  CVS: S1+S2 ,negative murmur/ gallop, regularly regular  Lungs: CTA B/L  Abdomen: BS+, no guarding, - rigidity.  Ext: negative pedal edema B/L LE             Using [facility]: yes      ASSESSMENT:    1. GUILLERMO (obstructive sleep apnea)               PLAN:  GUILLERMO:  PAP therapy: Discussed the result of the compliance > 4 hrs 90 5 and residual AHI 7.7 down from 13.4  and will continue on the  Auto CPAP 5- 12 cm H20, . And suggested to avoid drowsy driving and to loose weight and to have good sleep hygiene.       Education:we talked about the etiology of  GUILLERMO as well as the potential ramifications of untreated sleep apnea, which could include daytime sleepiness, hypertension, heart disease and/or stroke.  We discussed potential treatment options, which could include weight loss, body positioning, continuous positive airway pressure (CPAP), or referral for surgical consideration. Meanwhile, he  is urged to avoid supine sleep, weight gain and alcoholic beverages since all of these can worsen GUILLERMO.     Precautions: The patient was advised to abstain from driving should he feel sleepy  or drowsy.    Follow up:  Annually           Janet Galloway MD, FACP  Phone: [phone]  Fax: [phone]

## 2019-07-23 NOTE — PATIENT INSTRUCTIONS
Sleep Hygiene Practices    1. Try going to bed only when you are drowsy.    ?    2. If you are unable to fall asleep or stay asleep, leave your bedroom and engage in a quiet activity elsewhere. Do not permit yourself to fall asleep outside the bedroom. Return to bed when and only when you are sleepy. Repeat this process as often as necessary throughout night.    3. Maintain regular wake-up time, even on days off work & weekends    4. Use your bedroom for sleep and sex    5. Avoid napping during the daytime. If daytime sleepiness becomes overwhelming, limit nap time to a single nap of less than 1hr, no later than 3pm.    6. Distract your mind. Avoid clock watching. Lying in bed unable to sleep and frustrated needs to be avoided. Try reading or watching a videotape or listening to books on tape. It may be necessary to go into another room to do these.    7. Avoid caffeine within 4-6hrs of bedtime    8. Avoid use of nicotine close to bedtime    9. do not drink alcoholic beverages within 4-6hrs of bedtime    10. While a light snack before bedtime can help promote sound sleep, avoid large meals.    11. Obtain regular exercise, but avoid strenuous exercise within 4hrs of bedtime    12. Minimize light, noise, and extremes in temperature in the bedroom.    13. Precautions: The patient was advised to abstain from driving should they feel sleepy or drowsy.  ?    * This information is provided had been directly obtained from the [employer] wellness booklet on sleep hygiene. ([facility], [address] [place] [address]

## 2019-07-30 DIAGNOSIS — D49.59 NEOPLASM OF PROSTATE: ICD-10-CM

## 2019-07-31 ENCOUNTER — PATIENT MESSAGE (OUTPATIENT)
Dept: UROLOGY | Facility: CLINIC | Age: 64
End: 2019-07-31

## 2019-07-31 ENCOUNTER — TELEPHONE (OUTPATIENT)
Dept: UROLOGY | Facility: CLINIC | Age: 64
End: 2019-07-31

## 2019-07-31 NOTE — TELEPHONE ENCOUNTER
----- Message from Gagandeep Carrillo MD sent at 7/30/2019  5:57 PM CDT -----  Pt and wife notified of CAP diagnosis. Please arrange CT and bone Scan and call pt with schedule. Thanks   Statement Selected

## 2019-08-15 ENCOUNTER — PATIENT MESSAGE (OUTPATIENT)
Dept: UROLOGY | Facility: CLINIC | Age: 64
End: 2019-08-15

## 2019-08-16 DIAGNOSIS — Z12.11 COLON CANCER SCREENING: ICD-10-CM

## 2019-08-19 ENCOUNTER — PATIENT MESSAGE (OUTPATIENT)
Dept: UROLOGY | Facility: CLINIC | Age: 64
End: 2019-08-19

## 2019-08-28 ENCOUNTER — OFFICE VISIT (OUTPATIENT)
Dept: UROLOGY | Facility: CLINIC | Age: 64
End: 2019-08-28
Payer: COMMERCIAL

## 2019-08-28 VITALS — WEIGHT: 211 LBS | RESPIRATION RATE: 18 BRPM | BODY MASS INDEX: 28.58 KG/M2 | HEIGHT: 72 IN | OXYGEN SATURATION: 97 %

## 2019-08-28 DIAGNOSIS — R35.1 BENIGN PROSTATIC HYPERPLASIA WITH NOCTURIA: ICD-10-CM

## 2019-08-28 DIAGNOSIS — R35.1 NOCTURIA: ICD-10-CM

## 2019-08-28 DIAGNOSIS — R35.0 URINARY FREQUENCY: ICD-10-CM

## 2019-08-28 DIAGNOSIS — Z80.42 FAMILY HISTORY OF PROSTATE CANCER IN FATHER: ICD-10-CM

## 2019-08-28 DIAGNOSIS — R97.20 ELEVATED PSA, LESS THAN 10 NG/ML: ICD-10-CM

## 2019-08-28 DIAGNOSIS — N40.1 BENIGN PROSTATIC HYPERPLASIA WITH NOCTURIA: ICD-10-CM

## 2019-08-28 DIAGNOSIS — C61 PROSTATE CANCER: Primary | ICD-10-CM

## 2019-08-28 PROCEDURE — 99215 PR OFFICE/OUTPT VISIT, EST, LEVL V, 40-54 MIN: ICD-10-PCS | Mod: S$GLB,,, | Performed by: UROLOGY

## 2019-08-28 PROCEDURE — 99215 OFFICE O/P EST HI 40 MIN: CPT | Mod: S$GLB,,, | Performed by: UROLOGY

## 2019-08-28 PROCEDURE — 99999 PR PBB SHADOW E&M-EST. PATIENT-LVL IV: ICD-10-PCS | Mod: PBBFAC,,, | Performed by: UROLOGY

## 2019-08-28 PROCEDURE — 99999 PR PBB SHADOW E&M-EST. PATIENT-LVL IV: CPT | Mod: PBBFAC,,, | Performed by: UROLOGY

## 2019-08-28 RX ORDER — ERYTHROMYCIN 500 MG/1
1000 TABLET, COATED ORAL 2 TIMES DAILY
Qty: 4 TABLET | Refills: 0 | Status: SHIPPED | OUTPATIENT
Start: 2019-08-28 | End: 2019-08-29

## 2019-08-28 RX ORDER — SULFAMETHOXAZOLE AND TRIMETHOPRIM 800; 160 MG/1; MG/1
1 TABLET ORAL 2 TIMES DAILY
Qty: 28 TABLET | Refills: 1 | Status: SHIPPED | OUTPATIENT
Start: 2019-08-28 | End: 2019-09-11

## 2019-08-28 RX ORDER — NEOMYCIN SULFATE 500 MG/1
1000 TABLET ORAL 2 TIMES DAILY
Qty: 4 TABLET | Refills: 0 | Status: SHIPPED | OUTPATIENT
Start: 2019-08-28 | End: 2019-08-29

## 2019-08-28 RX ORDER — SODIUM CHLORIDE 9 MG/ML
INJECTION, SOLUTION INTRAVENOUS CONTINUOUS
Status: CANCELLED | OUTPATIENT
Start: 2019-08-28

## 2019-08-28 RX ORDER — CIPROFLOXACIN 2 MG/ML
400 INJECTION, SOLUTION INTRAVENOUS
Status: CANCELLED | OUTPATIENT
Start: 2019-08-28

## 2019-08-28 NOTE — PROGRESS NOTES
Subjective:       Patient ID: Paddy Nair is a 63 y.o. male.    Chief Complaint: Results    62 yo WM with PSA of 5.4 ng/ml Bianca 3+3=6 in R. Base and Mid and Left Mid. Family hx of CAP with 2 deaths from CAP. Desires RRP and BPLND. Discussed risks, benefits and complications of CAP were explained to the patient and his wife. Discussed RRP, IMRT, Brachytherapy, Hormonal Ablation. Chemo, AS and Cryo were explained to the pt and wife. Pt also has a right groin sebaceous cyst that needs to be removed.    Other   This is a new problem. The current episode started more than 1 month ago. The problem occurs constantly. The problem has been unchanged. Pertinent negatives include no abdominal pain, anorexia, arthralgias, change in bowel habit, chest pain, chills, congestion, coughing, diaphoresis, fatigue, fever, headaches, joint swelling, myalgias, nausea, neck pain, numbness, rash, sore throat, swollen glands, urinary symptoms, vertigo, visual change, vomiting or weakness. Nothing aggravates the symptoms. He has tried nothing for the symptoms.     Review of Systems   Constitutional: Negative for activity change, appetite change, chills, diaphoresis, fatigue, fever and unexpected weight change.   HENT: Negative for congestion, hearing loss, sinus pressure, sore throat and trouble swallowing.    Eyes: Negative for photophobia, pain, discharge and visual disturbance.   Respiratory: Negative for apnea, cough and shortness of breath.    Cardiovascular: Negative for chest pain, palpitations and leg swelling.   Gastrointestinal: Negative for abdominal distention, abdominal pain, anal bleeding, anorexia, blood in stool, change in bowel habit, constipation, diarrhea, nausea, rectal pain and vomiting.   Endocrine: Negative for cold intolerance, heat intolerance, polydipsia, polyphagia and polyuria.   Genitourinary: Negative for decreased urine volume, difficulty urinating, discharge, dysuria, enuresis, flank pain,  frequency, genital sores, hematuria, penile pain, penile swelling, scrotal swelling, testicular pain and urgency.   Musculoskeletal: Negative for arthralgias, back pain, joint swelling, myalgias and neck pain.   Skin: Negative for color change, pallor, rash and wound.   Allergic/Immunologic: Negative for environmental allergies, food allergies and immunocompromised state.   Neurological: Negative for dizziness, vertigo, seizures, weakness, numbness and headaches.   Hematological: Negative for adenopathy. Does not bruise/bleed easily.   Psychiatric/Behavioral: Negative.        Objective:      Physical Exam   Nursing note and vitals reviewed.  Constitutional: He is oriented to person, place, and time. He appears well-developed and well-nourished.   HENT:   Head: Normocephalic.   Nose: Nose normal.   Mouth/Throat: Oropharynx is clear and moist.   Eyes: Conjunctivae and EOM are normal. Pupils are equal, round, and reactive to light.   Neck: Normal range of motion. Neck supple.   Cardiovascular: Normal rate, regular rhythm, normal heart sounds and intact distal pulses.    Pulmonary/Chest: Effort normal and breath sounds normal.   Abdominal: Soft. Bowel sounds are normal.   Genitourinary: Penis normal.   Musculoskeletal: Normal range of motion.   Neurological: He is alert and oriented to person, place, and time. He has normal reflexes.   Skin: Skin is warm and dry.     Psychiatric: He has a normal mood and affect. His behavior is normal. Judgment and thought content normal.       Assessment:       1. Benign prostatic hyperplasia with nocturia    2. Elevated PSA, less than 10 ng/ml    3. Nocturia    4. Urinary frequency    5. Family history of prostate cancer in father        Plan:       Patient Instructions   Plan on RRP and BPLND on 9/23/2019.  F/U 10/1/19

## 2019-09-20 ENCOUNTER — TELEPHONE (OUTPATIENT)
Dept: UROLOGY | Facility: CLINIC | Age: 64
End: 2019-09-20

## 2019-09-20 NOTE — TELEPHONE ENCOUNTER
----- Message from Krunal Auguste sent at 9/20/2019  9:06 AM CDT -----  Contact: [phone]/self  Patient requesting to speak with you concerning an allergic reaction due to medication  Please advise

## 2019-09-25 ENCOUNTER — NURSE TRIAGE (OUTPATIENT)
Dept: ADMINISTRATIVE | Facility: CLINIC | Age: 64
End: 2019-09-25

## 2019-09-26 PROCEDURE — G0180 PR HOME HEALTH MD CERTIFICATION: ICD-10-PCS | Mod: ,,, | Performed by: UROLOGY

## 2019-09-26 PROCEDURE — G0180 MD CERTIFICATION HHA PATIENT: HCPCS | Mod: ,,, | Performed by: UROLOGY

## 2019-09-26 NOTE — TELEPHONE ENCOUNTER
Pt had questions about getting his incisions wet. Read through discharge papers with patient. Contact patient to further advsie    Reason for Disposition   Health Information question, no triage required and triager able to answer question    Additional Information   Negative: [1] Caller is not with the adult (patient) AND [2] reporting urgent symptoms   Negative: Lab result questions   Negative: Medication questions   Negative: Caller can't be reached by phone   Negative: Caller has already spoken to PCP or another triager   Negative: RN needs further essential information from caller in order to complete triage   Negative: Requesting regular office appointment   Negative: [1] Caller requesting NON-URGENT health information AND [2] PCP's office is the best resource    Protocols used: INFORMATION ONLY CALL-A-

## 2019-09-27 ENCOUNTER — PATIENT OUTREACH (OUTPATIENT)
Dept: ADMINISTRATIVE | Facility: CLINIC | Age: 64
End: 2019-09-27

## 2019-09-27 ENCOUNTER — TELEPHONE (OUTPATIENT)
Dept: INTERNAL MEDICINE | Facility: CLINIC | Age: 64
End: 2019-09-27

## 2019-09-27 NOTE — TELEPHONE ENCOUNTER
"I Spoke to patient to schedule a hospital follow up appointment. He states he does not need to see Dr. Garcia, he is seeing Dr. Carrillo. I explained that he needed to see her for being in the hospital a couple days. Pt still refused to schedule an appointment and said "If Dr. Carrillo tells me I need to see her (Dr. Garcia) then I will schedule an appointment."   "

## 2019-09-27 NOTE — PATIENT INSTRUCTIONS
Discharge Instructions for Radical Prostatectomy  You had a procedure called radical prostatectomy (removal of the entire prostate and surrounding tissues). This sheet will help you know what to do following surgery.  Activity  · Dont drive until your healthcare provider says its OK. This is usually after your catheter is removed and you are no longer taking pain medicine.  · For the first 2 weeks after surgery, limit physical activity. This will allow your body to rest and heal.  · Talk to your healthcare provider before going back to your normal activity level.  · Dont lift anything heavier than 10 pounds until your healthcare provider says its OK.  · Avoid long car rides.  · Avoid climbing stairs and strenuous exercise. Dont mow the lawn or use a vacuum .  · Take naps if you feel tired.  Home care  · Avoid constipation:  ¨ Eat fruits, vegetables, and whole grains.  ¨ Unless directed otherwise, drink 6 glasses to 8 glasses of water a day (enough to keep your urine light colored). This will also help keep a healthy flow of urine.  ¨ Use a laxative or a stool softener if your healthcare provider says its OK.  · Take care of your catheter. Ask for an information sheet and training before leaving the hospital:  ¨ Keep the catheter well secured.  ¨ Use either leg bags or external (straight drainage) bags, or both.  ¨ Empty your bag when its half full. You may notice some blood in the bag. This is normal after surgery and while the catheter is in place.  ¨ Use plain soap and water to wash the catheter and the head of your penis daily, or more often if needed.  · Return to your normal diet.  · Shower as usual.  · Be sure to finish the antibiotics that your doctor prescribed.  · Be sure to take pain medicine if needed and as prescribed.  · Consider wearing sweat pants while you have the catheter. They may be more comfortable than other pants.  Follow-up  Make a follow-up appointment as directed.  When to  call your healthcare provider  Call your healthcare provider right away if you have any of the following:  · Fever above 100.4°F (38°C) or shaking chills  · Heavy bleeding, clots, or bright red blood from the catheter  · Catheter that falls out or stops draining  · Foul-smelling discharge from your catheter  · Redness, swelling, warmth, or pain at your incision site  · Drainage, pus, or bleeding from your incision  · Trouble breathing  · Hives or rash  · Nausea and vomiting  · Diarrhea   Date Last Reviewed: 1/1/2017  © 3421-7572 [employer]. [address] [place] [address]. All rights reserved. This information is not intended as a substitute for professional medical care. Always follow your healthcare professional's instructions.

## 2019-10-01 ENCOUNTER — OFFICE VISIT (OUTPATIENT)
Dept: UROLOGY | Facility: CLINIC | Age: 64
End: 2019-10-01
Payer: COMMERCIAL

## 2019-10-01 VITALS
SYSTOLIC BLOOD PRESSURE: 126 MMHG | HEIGHT: 73 IN | BODY MASS INDEX: 27.96 KG/M2 | RESPIRATION RATE: 18 BRPM | HEART RATE: 77 BPM | DIASTOLIC BLOOD PRESSURE: 72 MMHG | WEIGHT: 211 LBS | OXYGEN SATURATION: 98 % | TEMPERATURE: 99 F

## 2019-10-01 DIAGNOSIS — Z98.890 POSTOPERATIVE STATE: ICD-10-CM

## 2019-10-01 DIAGNOSIS — C61 PROSTATE CANCER: Primary | ICD-10-CM

## 2019-10-01 PROCEDURE — 99024 POSTOP FOLLOW-UP VISIT: CPT | Mod: S$GLB,,, | Performed by: UROLOGY

## 2019-10-01 PROCEDURE — 99024 PR POST-OP FOLLOW-UP VISIT: ICD-10-PCS | Mod: S$GLB,,, | Performed by: UROLOGY

## 2019-10-01 PROCEDURE — 99999 PR PBB SHADOW E&M-EST. PATIENT-LVL III: ICD-10-PCS | Mod: PBBFAC,,, | Performed by: UROLOGY

## 2019-10-01 PROCEDURE — 99999 PR PBB SHADOW E&M-EST. PATIENT-LVL III: CPT | Mod: PBBFAC,,, | Performed by: UROLOGY

## 2019-10-01 RX ORDER — CIPROFLOXACIN 500 MG/1
TABLET ORAL
Refills: 0 | COMMUNITY
Start: 2019-09-25 | End: 2019-12-20

## 2019-10-01 NOTE — PROGRESS NOTES
"Paddy Nair is a 64 y.o. male patient.   1. Prostate cancer    2. Postoperative state      Past Medical History:   Diagnosis Date    Elevated PSA, less than 10 ng/ml 8/21/2018    Urinary tract infection      Past Surgical History Pertinent Negatives:   Procedure Date Noted    CYSTOSCOPY 08/21/2018    LITHOTRIPSY 08/28/2019    PROSTATE SURGERY 08/21/2018    VASECTOMY 08/21/2018     Scheduled Meds:  Continuous Infusions:  PRN Meds:    Review of patient's allergies indicates:   Allergen Reactions    Bactrim [sulfamethoxazole-trimethoprim] Hives     There are no hospital problems to display for this patient.    Blood pressure 126/72, pulse 77, temperature 98.7 °F (37.1 °C), resp. rate 18, height 6' 1" (1.854 m), weight 95.7 kg (211 lb), SpO2 98 %.    Subjective:   Diet: Adequate intake.  Patient reports no nausea or vomiting.    Activity level: Normal.    Pain control: Well controlled.      Objective:  Vital signs (most recent): Blood pressure 126/72, pulse 77, temperature 98.7 °F (37.1 °C), resp. rate 18, height 6' 1" (1.854 m), weight 95.7 kg (211 lb), SpO2 98 %.  General appearance: Comfortable, well-appearing, in no acute distress and not in pain.    Lungs:  Normal effort.    Heart: Normal rate.    Chest: Symmetric chest wall expansion.    Abdomen: Abdomen is soft.    Bowel sounds:  Bowel sounds are normal.    Tenderness: There is no abdominal tenderness tenderness.    Wound:  Clean.    Extremities: There is normal range of motion.    Neurological: The patient is alert and oriented to person, place and time.  Normal strength.  Pupils are equal, round, and reactive to light.       Assessment:   Post-op: 8 days.    Condition: In stable condition.     Plan:  Encourage ambulation and out of bed and up to chair.  Continue wound care as written and change dressings as ordered.  Regular diet.  Resume oral medications.      Patient Instructions   F/U 3 days for voiding trial         Gagandeep Carrillo, " MD  10/1/2019

## 2019-10-04 ENCOUNTER — CLINICAL SUPPORT (OUTPATIENT)
Dept: UROLOGY | Facility: CLINIC | Age: 64
End: 2019-10-04
Payer: COMMERCIAL

## 2019-10-04 VITALS — WEIGHT: 211 LBS | BODY MASS INDEX: 27.96 KG/M2 | HEIGHT: 73 IN

## 2019-10-04 PROCEDURE — 99999 PR PBB SHADOW E&M-EST. PATIENT-LVL I: CPT | Mod: PBBFAC,,,

## 2019-10-04 PROCEDURE — 99999 PR PBB SHADOW E&M-EST. PATIENT-LVL I: ICD-10-PCS | Mod: PBBFAC,,,

## 2019-10-11 ENCOUNTER — OFFICE VISIT (OUTPATIENT)
Dept: UROLOGY | Facility: CLINIC | Age: 64
End: 2019-10-11
Payer: COMMERCIAL

## 2019-10-11 VITALS — HEIGHT: 73 IN | RESPIRATION RATE: 18 BRPM | WEIGHT: 210 LBS | OXYGEN SATURATION: 98 % | BODY MASS INDEX: 27.83 KG/M2

## 2019-10-11 DIAGNOSIS — L72.3 SEBACEOUS CYST: ICD-10-CM

## 2019-10-11 DIAGNOSIS — C61 PROSTATE CANCER: Primary | ICD-10-CM

## 2019-10-11 PROCEDURE — 99999 PR PBB SHADOW E&M-EST. PATIENT-LVL III: ICD-10-PCS | Mod: PBBFAC,,, | Performed by: UROLOGY

## 2019-10-11 PROCEDURE — 99024 POSTOP FOLLOW-UP VISIT: CPT | Mod: S$GLB,,, | Performed by: UROLOGY

## 2019-10-11 PROCEDURE — 99999 PR PBB SHADOW E&M-EST. PATIENT-LVL III: CPT | Mod: PBBFAC,,, | Performed by: UROLOGY

## 2019-10-11 PROCEDURE — 99024 PR POST-OP FOLLOW-UP VISIT: ICD-10-PCS | Mod: S$GLB,,, | Performed by: UROLOGY

## 2019-10-11 RX ORDER — TADALAFIL 5 MG/1
5 TABLET ORAL DAILY PRN
Qty: 30 TABLET | Refills: 11 | Status: SHIPPED | OUTPATIENT
Start: 2019-10-11 | End: 2020-12-15

## 2019-10-11 NOTE — PROGRESS NOTES
"Paddy Nair is a 64 y.o. male patient.   No diagnosis found.  Past Medical History:   Diagnosis Date    Elevated PSA, less than 10 ng/ml 8/21/2018    Urinary tract infection      Past Surgical History Pertinent Negatives:   Procedure Date Noted    CYSTOSCOPY 08/21/2018    LITHOTRIPSY 08/28/2019    PROSTATE SURGERY 08/21/2018    VASECTOMY 08/21/2018     Scheduled Meds:  Continuous Infusions:  PRN Meds:    Review of patient's allergies indicates:   Allergen Reactions    Bactrim [sulfamethoxazole-trimethoprim] Hives     There are no hospital problems to display for this patient.    Resp. rate 18, height 6' 1" (1.854 m), weight 95.3 kg (210 lb), SpO2 98 %.    Subjective:   Diet: Adequate intake.  Patient reports no nausea or vomiting.    Activity level: Normal.    Pain control: Well controlled.      Objective:  Vital signs (most recent): Resp. rate 18, height 6' 1" (1.854 m), weight 95.3 kg (210 lb), SpO2 98 %.  General appearance: Comfortable, well-appearing, in no acute distress and not in pain.    Lungs:  Normal respiratory rate and normal effort.  Breath sounds normal.    Heart: Normal rate.  Regular rhythm.  S1 normal.    Chest: Symmetric chest wall expansion.    Abdomen: Abdomen is soft.    Bowel sounds:  Bowel sounds are normal.    Tenderness: There is no abdominal tenderness tenderness.    Wound:  Clean.  There is no drainage.    Extremities: There is normal range of motion.    Neurological: The patient is alert and oriented to person, place and time.  Normal strength.  Pupils are equal, round, and reactive to light.       Assessment:   Post-op: 18 days.    Condition: In stable condition.     Plan:  Discharge home.  Encourage ambulation and out of bed and up to chair.  Continue wound care as written.  Regular diet.  Resume oral medications.           Gagandeep Carrillo MD  10/11/2019  "

## 2019-10-14 ENCOUNTER — TELEPHONE (OUTPATIENT)
Dept: HOME HEALTH SERVICES | Facility: HOSPITAL | Age: 64
End: 2019-10-14

## 2019-10-14 ENCOUNTER — EXTERNAL HOME HEALTH (OUTPATIENT)
Dept: HOME HEALTH SERVICES | Facility: HOSPITAL | Age: 64
End: 2019-10-14
Payer: COMMERCIAL

## 2019-10-22 ENCOUNTER — NURSE TRIAGE (OUTPATIENT)
Dept: ADMINISTRATIVE | Facility: CLINIC | Age: 64
End: 2019-10-22

## 2019-10-22 ENCOUNTER — OFFICE VISIT (OUTPATIENT)
Dept: UROLOGY | Facility: CLINIC | Age: 64
End: 2019-10-22
Payer: COMMERCIAL

## 2019-10-22 VITALS — BODY MASS INDEX: 27.82 KG/M2 | HEIGHT: 73 IN | WEIGHT: 209.88 LBS

## 2019-10-22 DIAGNOSIS — L72.3 SEBACEOUS CYST: Primary | ICD-10-CM

## 2019-10-22 PROCEDURE — 87076 CULTURE ANAEROBE IDENT EACH: CPT | Mod: 59

## 2019-10-22 PROCEDURE — 99999 PR PBB SHADOW E&M-EST. PATIENT-LVL III: CPT | Mod: PBBFAC,,, | Performed by: UROLOGY

## 2019-10-22 PROCEDURE — 87075 CULTR BACTERIA EXCEPT BLOOD: CPT

## 2019-10-22 PROCEDURE — 99999 PR PBB SHADOW E&M-EST. PATIENT-LVL III: ICD-10-PCS | Mod: PBBFAC,,, | Performed by: UROLOGY

## 2019-10-22 PROCEDURE — 99024 POSTOP FOLLOW-UP VISIT: CPT | Mod: S$GLB,,, | Performed by: UROLOGY

## 2019-10-22 PROCEDURE — 99024 PR POST-OP FOLLOW-UP VISIT: ICD-10-PCS | Mod: S$GLB,,, | Performed by: UROLOGY

## 2019-10-22 RX ORDER — LEVOFLOXACIN 500 MG/1
500 TABLET, FILM COATED ORAL DAILY
Qty: 14 TABLET | Refills: 0 | Status: SHIPPED | OUTPATIENT
Start: 2019-10-22 | End: 2019-11-05

## 2019-10-22 NOTE — PROGRESS NOTES
"Paddy Nair is a 64 y.o. male patient.   1. Sebaceous cyst      Past Medical History:   Diagnosis Date    Elevated PSA, less than 10 ng/ml 8/21/2018    Urinary tract infection      Past Surgical History Pertinent Negatives:   Procedure Date Noted    CYSTOSCOPY 08/21/2018    LITHOTRIPSY 08/28/2019    PROSTATE SURGERY 08/21/2018    VASECTOMY 08/21/2018     Scheduled Meds:  Continuous Infusions:  PRN Meds:    Review of patient's allergies indicates:   Allergen Reactions    Bactrim [sulfamethoxazole-trimethoprim] Hives     There are no hospital problems to display for this patient.    Height 6' 1" (1.854 m), weight 95.2 kg (209 lb 14.1 oz).    Subjective:   Diet: Adequate intake.  Patient reports no nausea or vomiting.    Activity level: Normal.    Pain control: Well controlled.      Objective:  Vital signs (most recent): Height 6' 1" (1.854 m), weight 95.2 kg (209 lb 14.1 oz).  General appearance: Comfortable, well-appearing, in no acute distress and not in pain.    Lungs:  Normal respiratory rate and normal effort.  Breath sounds normal.    Heart: Normal rate.  Regular rhythm.  S1 normal.    Chest: Symmetric chest wall expansion.    Abdomen: Abdomen is soft.    Bowel sounds:  Bowel sounds are normal.    Tenderness: There is no abdominal tenderness tenderness.    Wound:  Clean.    Extremities: There is normal range of motion.    Neurological: The patient is alert and oriented to person, place and time.  Normal strength.       Assessment:   Post-op: 29 days.    Condition: In stable condition.     Plan:  Encourage ambulation.  Change dressings as ordered and continue wound care as written.  Regular diet.  Recheck labs as ordered, resume oral medications and start antibiotics.      Patient Instructions   Clean area of incision daily  Levaquin 500 mg Daily x 2 weeks           Gagandeep Carrillo MD  10/22/2019  "

## 2019-10-22 NOTE — TELEPHONE ENCOUNTER
Reason for Disposition   [1] Clear or blood-tinged fluid draining from wound AND [2] no fever    Additional Information   Negative: [1] Major abdominal surgical incision AND [2] wound gaping open AND [3] visible internal organs   Negative: Sounds like a life-threatening emergency to the triager   Negative: [1] Bleeding from incision AND [2] won't stop after 10 minutes of direct pressure   Negative: [1] Widespread rash AND [2] bright red, sunburn-like   Negative: Severe pain in the incision   Negative: [1] Suture came out early AND [2] wound gaping AND [3] < 48 hours since sutures placed   Negative: [1] Incision gaping open AND [2] length of opening > 2 inches (5 cm)   Negative: Patient sounds very sick or weak to the triager   Negative: Sounds like a serious complication to the triager   Negative: Fever > 100.5 F (38.1 C)   Negative: [1] Incision looks infected (spreading redness, pain) AND [2] fever > 99.5 F (37.5 C)   Negative: [1] Incision looks infected (spreading redness, pain) AND [2] large red area (> 2 in. or 5 cm)   Negative: [1] Incision looks infected (spreading redness, pain) AND [2] face wound   Negative: [1] Red streak runs from the incision AND [2] longer than 1 inch (2.5 cm)   Negative: [1] Pus or bad-smelling fluid draining from incision AND [2] no fever   Negative: [1] Post-op pain AND [2] not controlled with pain medications   Negative: Dressing soaked with blood or body fluid (e.g., drainage)   Negative: [1] Caller has URGENT question AND [2] triager unable to answer question   Negative: [1] Small red area or streak AND [2] no fever    Protocols used: POST-OP INCISION [id number]-A-AH    Pt called stated he had surgery on 9/23 and  his sutures removed on last week and today his incision is draining a pink drainage. Pt stated he can't see the incision because of location but the drainage on the toliet paper is less than the size of a dime if he dab it.  Care advice recommends  pt be seen by md within 24 hours. Please call pt at [phone] and advise.

## 2019-10-25 ENCOUNTER — PATIENT MESSAGE (OUTPATIENT)
Dept: UROLOGY | Facility: CLINIC | Age: 64
End: 2019-10-25

## 2019-10-25 RX ORDER — LEVOFLOXACIN 500 MG/1
500 TABLET, FILM COATED ORAL DAILY
Qty: 14 TABLET | Refills: 0 | Status: SHIPPED | OUTPATIENT
Start: 2019-10-25 | End: 2019-11-08

## 2019-10-25 RX ORDER — METRONIDAZOLE 500 MG/1
500 TABLET ORAL EVERY 8 HOURS
Qty: 42 TABLET | Refills: 0 | Status: SHIPPED | OUTPATIENT
Start: 2019-10-25 | End: 2019-11-08

## 2019-10-28 ENCOUNTER — TELEPHONE (OUTPATIENT)
Dept: UROLOGY | Facility: CLINIC | Age: 64
End: 2019-10-28

## 2019-10-28 LAB
BACTERIA SPEC ANAEROBE CULT: ABNORMAL
BACTERIA SPEC ANAEROBE CULT: ABNORMAL

## 2019-10-28 NOTE — TELEPHONE ENCOUNTER
----- Message from Gagandeep Carrillo MD sent at 10/25/2019  6:47 PM CDT -----  Two bacteria are growing from sebaceous cyst site Flagyl and Levaquin for 2 weeks called in to [facility]

## 2019-12-05 ENCOUNTER — PATIENT MESSAGE (OUTPATIENT)
Dept: UROLOGY | Facility: CLINIC | Age: 64
End: 2019-12-05

## 2019-12-05 DIAGNOSIS — C61 PROSTATE CANCER: Primary | ICD-10-CM

## 2019-12-12 ENCOUNTER — PATIENT MESSAGE (OUTPATIENT)
Dept: INTERNAL MEDICINE | Facility: CLINIC | Age: 64
End: 2019-12-12

## 2019-12-12 DIAGNOSIS — Z78.9 PARTICIPANT IN HEALTH AND WELLNESS PLAN: Primary | ICD-10-CM

## 2019-12-17 ENCOUNTER — OFFICE VISIT (OUTPATIENT)
Dept: INTERNAL MEDICINE | Facility: CLINIC | Age: 64
End: 2019-12-17
Payer: COMMERCIAL

## 2019-12-17 VITALS
HEIGHT: 73 IN | HEART RATE: 70 BPM | RESPIRATION RATE: 18 BRPM | WEIGHT: 214.38 LBS | SYSTOLIC BLOOD PRESSURE: 110 MMHG | BODY MASS INDEX: 28.41 KG/M2 | TEMPERATURE: 98 F | OXYGEN SATURATION: 98 % | DIASTOLIC BLOOD PRESSURE: 80 MMHG

## 2019-12-17 DIAGNOSIS — G47.33 OSA (OBSTRUCTIVE SLEEP APNEA): ICD-10-CM

## 2019-12-17 DIAGNOSIS — C61 PROSTATE CANCER: ICD-10-CM

## 2019-12-17 DIAGNOSIS — Z78.9 PARTICIPANT IN HEALTH AND WELLNESS PLAN: Primary | ICD-10-CM

## 2019-12-17 DIAGNOSIS — Z98.890 POSTOPERATIVE STATE: ICD-10-CM

## 2019-12-17 PROCEDURE — 99396 PR PREVENTIVE VISIT,EST,40-64: ICD-10-PCS | Mod: S$GLB,,, | Performed by: INTERNAL MEDICINE

## 2019-12-17 PROCEDURE — 99396 PREV VISIT EST AGE 40-64: CPT | Mod: S$GLB,,, | Performed by: INTERNAL MEDICINE

## 2019-12-17 PROCEDURE — 99999 PR PBB SHADOW E&M-EST. PATIENT-LVL III: CPT | Mod: PBBFAC,,, | Performed by: INTERNAL MEDICINE

## 2019-12-17 PROCEDURE — 99999 PR PBB SHADOW E&M-EST. PATIENT-LVL III: ICD-10-PCS | Mod: PBBFAC,,, | Performed by: INTERNAL MEDICINE

## 2019-12-20 ENCOUNTER — OFFICE VISIT (OUTPATIENT)
Dept: UROLOGY | Facility: CLINIC | Age: 64
End: 2019-12-20
Payer: COMMERCIAL

## 2019-12-20 VITALS
RESPIRATION RATE: 18 BRPM | HEART RATE: 94 BPM | SYSTOLIC BLOOD PRESSURE: 130 MMHG | OXYGEN SATURATION: 98 % | BODY MASS INDEX: 28.36 KG/M2 | TEMPERATURE: 99 F | WEIGHT: 214 LBS | HEIGHT: 73 IN | DIASTOLIC BLOOD PRESSURE: 74 MMHG

## 2019-12-20 DIAGNOSIS — C61 PROSTATE CANCER: ICD-10-CM

## 2019-12-20 DIAGNOSIS — Z80.42 FAMILY HISTORY OF PROSTATE CANCER IN FATHER: ICD-10-CM

## 2019-12-20 DIAGNOSIS — R35.1 NOCTURIA: ICD-10-CM

## 2019-12-20 DIAGNOSIS — R97.20 ELEVATED PSA: Primary | ICD-10-CM

## 2019-12-20 DIAGNOSIS — Z98.890 POSTOPERATIVE STATE: ICD-10-CM

## 2019-12-20 DIAGNOSIS — N52.03 COMBINED ARTERIAL INSUFFICIENCY AND CORPORO-VENOUS OCCLUSIVE ERECTILE DYSFUNCTION: ICD-10-CM

## 2019-12-20 PROCEDURE — 99214 OFFICE O/P EST MOD 30 MIN: CPT | Mod: 24,S$GLB,, | Performed by: UROLOGY

## 2019-12-20 PROCEDURE — 99214 PR OFFICE/OUTPT VISIT, EST, LEVL IV, 30-39 MIN: ICD-10-PCS | Mod: 24,S$GLB,, | Performed by: UROLOGY

## 2019-12-20 PROCEDURE — 99999 PR PBB SHADOW E&M-EST. PATIENT-LVL IV: ICD-10-PCS | Mod: PBBFAC,,, | Performed by: UROLOGY

## 2019-12-20 PROCEDURE — 99999 PR PBB SHADOW E&M-EST. PATIENT-LVL IV: CPT | Mod: PBBFAC,,, | Performed by: UROLOGY

## 2019-12-20 NOTE — PROGRESS NOTES
Subjective:       Patient ID: Paddy Nair is a 64 y.o. male.    Chief Complaint: Prostate Cancer    63 yo WM 3 months s/p RRP and BPLND. Mild erections returning but not full. No urinary leakage    Other   This is a new (Prostate Ca) problem. The current episode started more than 1 month ago. The problem occurs constantly. The problem has been gradually improving. Pertinent negatives include no abdominal pain, anorexia, arthralgias, change in bowel habit, chest pain, chills, congestion, coughing, diaphoresis, fatigue, fever, headaches, joint swelling, myalgias, nausea, neck pain, numbness, rash, sore throat, swollen glands, urinary symptoms, vertigo, visual change, vomiting or weakness.     Review of Systems   Constitutional: Negative for activity change, appetite change, chills, diaphoresis, fatigue, fever and unexpected weight change.   HENT: Negative for congestion, hearing loss, sinus pressure, sore throat and trouble swallowing.    Eyes: Negative for photophobia, pain, discharge and visual disturbance.   Respiratory: Negative for apnea, cough and shortness of breath.    Cardiovascular: Negative for chest pain, palpitations and leg swelling.   Gastrointestinal: Negative for abdominal distention, abdominal pain, anal bleeding, anorexia, blood in stool, change in bowel habit, constipation, diarrhea, nausea, rectal pain and vomiting.   Endocrine: Negative for cold intolerance, heat intolerance, polydipsia, polyphagia and polyuria.   Genitourinary: Negative for decreased urine volume, difficulty urinating, discharge, dysuria, enuresis, flank pain, frequency, genital sores, hematuria, penile pain, penile swelling, scrotal swelling, testicular pain and urgency.   Musculoskeletal: Negative for arthralgias, back pain, joint swelling, myalgias and neck pain.   Skin: Negative for color change, pallor, rash and wound.   Allergic/Immunologic: Negative for environmental allergies, food allergies and  immunocompromised state.   Neurological: Negative for dizziness, vertigo, seizures, weakness, numbness and headaches.   Hematological: Negative for adenopathy. Does not bruise/bleed easily.   Psychiatric/Behavioral: Negative.        Objective:      Physical Exam   Nursing note and vitals reviewed.  Constitutional: He is oriented to person, place, and time. He appears well-developed and well-nourished.   HENT:   Head: Normocephalic.   Nose: Nose normal.   Mouth/Throat: Oropharynx is clear and moist.   Eyes: Conjunctivae and EOM are normal. Pupils are equal, round, and reactive to light.   Neck: Normal range of motion. Neck supple.   Cardiovascular: Normal rate, regular rhythm, normal heart sounds and intact distal pulses.    Pulmonary/Chest: Effort normal and breath sounds normal.   Abdominal: Soft. Bowel sounds are normal.   Genitourinary:   Genitourinary Comments: Prostate surgical absent   Musculoskeletal: Normal range of motion.   Neurological: He is alert and oriented to person, place, and time. He has normal reflexes.   Skin: Skin is warm and dry.     Psychiatric: He has a normal mood and affect. His behavior is normal. Judgment and thought content normal.       Assessment:       1. Elevated PSA    2. Prostate cancer    3. Nocturia    4. Combined arterial insufficiency and corporo-venous occlusive erectile dysfunction    5. Family history of prostate cancer in father        Plan:     Patient Instructions   Repeat PSA in 3 months  F/U 3 months

## 2019-12-26 NOTE — PROGRESS NOTES
INTERNAL MEDICINE    Patient Active Problem List   Diagnosis    Elevated PSA, less than 10 ng/ml    Vasculogenic erectile dysfunction    Low testosterone    GUILLERMO (obstructive sleep apnea)    Benign prostatic hyperplasia with nocturia    Urinary frequency    Nocturia    Fatigue    Abnormal level of female sex hormones    Elevated PSA    Family history of prostate cancer in father    Prostate cancer    Postoperative state    Sebaceous cyst       CC:   Chief Complaint   Patient presents with    Annual Exam       SUBJECTIVE:  Paddy Nair   is a 64 y.o. male  HPI   64y/oWM here for his annual exam.   Medically, other than GUILLERMO, his problems have been Urologic in origin: BPH,ED,low T, then prostate cancer. This required a radical retropubic prostatectomy. 8/19.    He is due for a colonoscopy, but not for several more months.    Denies HA,dizziness,palpitations,CP,SOB,WHEELER,PND,orthopnea.  No N,V,D,C.  No rashes.  No numbness.  ROS: Review of Systems   Constitutional: Negative for activity change and unexpected weight change.   HENT: Negative for hearing loss, rhinorrhea and trouble swallowing.    Eyes: Negative for discharge and visual disturbance.   Respiratory: Negative for chest tightness and wheezing.    Cardiovascular: Negative for chest pain and palpitations.   Gastrointestinal: Negative for blood in stool, constipation, diarrhea and vomiting.   Endocrine: Negative for polydipsia and polyuria.   Genitourinary: Negative for difficulty urinating, hematuria and urgency.   Musculoskeletal: Negative for arthralgias, joint swelling and neck pain.   Neurological: Negative for weakness and headaches.   Psychiatric/Behavioral: Negative for confusion and dysphoric mood.       Past Medical History:   Diagnosis Date    Elevated PSA, less than 10 ng/ml 8/21/2018    Urinary tract infection        Past Surgical History:   Procedure Laterality Date    EXCISION OF LESION OF SCROTUM Right 9/23/2019     "Procedure: EXCISION, LESION, SCROTUM;  Surgeon: Gagandeep Carrillo MD;  Location: [facility] OR;  Service: Urology;  Laterality: Right;    RADICAL RETROPUBIC PROSTATECTOMY N/A 2019    Procedure: PROSTATECTOMY, RETROPUBIC, RADICAL,;  Surgeon: Gagandeep Carrillo MD;  Location: [facility] OR;  Service: Urology;  Laterality: N/A;    TONSILLECTOMY         Family History   Problem Relation Age of Onset    Prostate cancer Neg Hx     Kidney disease Neg Hx        Social History     Tobacco Use    Smoking status: Former Smoker     Types: Cigarettes     Last attempt to quit: 2017     Years since quittin.0    Smokeless tobacco: Never Used   Substance Use Topics    Alcohol use: Yes     Alcohol/week: 1.0 - 2.0 standard drinks     Types: 1 - 2 Cans of beer per week     Comment: occ    Drug use: No       Social History     Social History Narrative    Not on file       ALLERGIES:   Review of patient's allergies indicates:   Allergen Reactions    Bactrim [sulfamethoxazole-trimethoprim] Hives       MEDS:   Current Outpatient Medications:     tadalafil (CIALIS) 5 MG tablet, Take 1 tablet (5 mg total) by mouth daily as needed for Erectile Dysfunction., Disp: 30 tablet, Rfl: 11    FLUCELVAX QUAD 0515-7741, PF, 60 mcg (15 mcg x 4)/0.5 mL Syrg, ADM 0.5ML IM UTD, Disp: , Rfl: 0    sildenafil (VIAGRA) 100 MG tablet, TAKE ONE TABLET BY MOUTH DAILY AS NEEDED FOR ERECTILE DYSFUNCTION., Disp: 10 tablet, Rfl: 0    OBJECTIVE:   Vitals:    19 0954   BP: 110/80   BP Location: Left arm   Patient Position: Sitting   BP Method: Large (Manual)   Pulse: 70   Resp: 18   Temp: 98.2 °F (36.8 °C)   TempSrc: Oral   SpO2: 98%   Weight: 97.3 kg (214 lb 6.4 oz)   Height: 6' 1" (1.854 m)     Body mass index is 28.29 kg/m².    Physical Exam   Constitutional: He is oriented to person, place, and time. He appears well-developed and well-nourished.   HENT:   Head: Normocephalic and atraumatic.   Right Ear: External ear normal.   Left Ear: External " ear normal.   Nose: Nose normal.   Mouth/Throat: Oropharynx is clear and moist.   Eyes: Pupils are equal, round, and reactive to light. Conjunctivae and EOM are normal.   Neck: Normal range of motion. Neck supple.   Cardiovascular: Normal rate, regular rhythm and normal heart sounds.   Pulmonary/Chest: Effort normal and breath sounds normal.   Abdominal: Soft. Bowel sounds are normal.   Musculoskeletal: Normal range of motion.   Neurological: He is alert and oriented to person, place, and time.   Skin: Skin is warm and dry.   Psychiatric: He has a normal mood and affect. His behavior is normal. Judgment and thought content normal.   Nursing note and vitals reviewed.        PERTINENT RESULTS:   CBC:  Recent Labs   Lab Result Units 12/13/19  0818   WBC K/uL 5.25   RBC M/uL 4.11*   Hemoglobin g/dL 11.0*   Hematocrit % 36.0*   Platelets K/uL 192   Mean Corpuscular Volume fL 88   Mean Corpuscular Hemoglobin pg 26.8*   Mean Corpuscular Hemoglobin Conc g/dL 30.6*     CMP:  Recent Labs   Lab Result Units 12/13/19  0818   Glucose mg/dL 128*   Calcium mg/dL 8.9   Albumin g/dL 3.9   Total Protein g/dL 7.1   Sodium mmol/L 143   Potassium mmol/L 4.7   CO2 mmol/L 27   Chloride mmol/L 107   BUN, Bld mg/dL 16   Alkaline Phosphatase U/L 77   ALT U/L 17   AST U/L 26   Total Bilirubin mg/dL 0.4     URINALYSIS:  Recent Labs   Lab Result Units 12/13/19  0819   Color, UA  Yellow   Specific Gravity, UA  1.015   pH, UA  6.0   Protein, UA  Negative   Nitrite, UA  Negative   Leukocytes, UA  Negative   Urobilinogen, UA EU/dL Negative      LIPIDS:  Recent Labs   Lab Result Units 12/13/19  0818   HDL mg/dL 39*   Cholesterol mg/dL 170   Triglycerides mg/dL 107   LDL Cholesterol mg/dL 109.6   Hdl/Cholesterol Ratio % 22.9   Non-HDL Cholesterol mg/dL 131   Total Cholesterol/HDL Ratio  4.4             ASSESSMENT:  Problem List Items Addressed This Visit        Oncology    Prostate cancer       Other    GUILLERMO (obstructive sleep apnea)    Postoperative  state      Other Visit Diagnoses     Participant in health and wellness plan    -  Primary          PLAN:      No orders of the defined types were placed in this encounter.  Continue f/u with Urology.    Follow-up with  in [place] in 6 months.   Dr. Machelle Garcia  Internal Medicine

## 2020-02-03 ENCOUNTER — TELEPHONE (OUTPATIENT)
Dept: OPTOMETRY | Facility: CLINIC | Age: 65
End: 2020-02-03

## 2020-02-03 NOTE — TELEPHONE ENCOUNTER
Advised pt Dr Collins is currently out of the country and will not be returning until 02/10/2020. Pt would like to be seen sooner so Dr Mercado/ Vini Dave's information wad given. If things do not work out with them he can give us a call back to schedule for when Dr Collins returns.

## 2020-02-03 NOTE — TELEPHONE ENCOUNTER
----- Message from Fuad Gomez sent at 2/3/2020 11:26 AM CST -----  Contact: pt  Calling to schedule appt     Call back: [phone]

## 2020-02-04 ENCOUNTER — TELEPHONE (OUTPATIENT)
Dept: OPHTHALMOLOGY | Facility: CLINIC | Age: 65
End: 2020-02-04

## 2020-02-14 ENCOUNTER — OFFICE VISIT (OUTPATIENT)
Dept: URGENT CARE | Facility: CLINIC | Age: 65
End: 2020-02-14
Payer: COMMERCIAL

## 2020-02-14 VITALS
RESPIRATION RATE: 18 BRPM | WEIGHT: 210 LBS | HEIGHT: 73 IN | TEMPERATURE: 97 F | HEART RATE: 71 BPM | OXYGEN SATURATION: 98 % | SYSTOLIC BLOOD PRESSURE: 136 MMHG | BODY MASS INDEX: 27.83 KG/M2 | DIASTOLIC BLOOD PRESSURE: 71 MMHG

## 2020-02-14 DIAGNOSIS — H92.02 OTALGIA OF LEFT EAR: Primary | ICD-10-CM

## 2020-02-14 DIAGNOSIS — H61.22 IMPACTED CERUMEN OF LEFT EAR: ICD-10-CM

## 2020-02-14 DIAGNOSIS — R09.81 NASAL CONGESTION: ICD-10-CM

## 2020-02-14 PROCEDURE — 69209 REMOVE IMPACTED EAR WAX UNI: CPT | Mod: LT,S$GLB,, | Performed by: NURSE PRACTITIONER

## 2020-02-14 PROCEDURE — 99214 PR OFFICE/OUTPT VISIT, EST, LEVL IV, 30-39 MIN: ICD-10-PCS | Mod: 25,S$GLB,, | Performed by: NURSE PRACTITIONER

## 2020-02-14 PROCEDURE — 69209 EAR CERUMEN REMOVAL: ICD-10-PCS | Mod: LT,S$GLB,, | Performed by: NURSE PRACTITIONER

## 2020-02-14 PROCEDURE — 99214 OFFICE O/P EST MOD 30 MIN: CPT | Mod: 25,S$GLB,, | Performed by: NURSE PRACTITIONER

## 2020-02-14 NOTE — PATIENT INSTRUCTIONS
"Always follow your healthcare professional's instructions.    You have received urgent care diagnosis and treatment and you may be released before all of your medical problems are known or treated. Unless you have been given a referral, you (the patient), will arrange for follow-up care as instructed.     If you have been given a referral, [facility] will contact you (their phone number is [phone]). If they do NOT call you by the end of the business day, please call them the following day.  Cerumen Removal    During your exam, wax was flushed out of your ear canal.  To prevent a "swimmer's ear" from the flush, please use 2-4 drops of a 1:1 solution of rubbing alcohol and distilled white vinegar one time.      Unscented mineral oil 2-4 drops monthly or over-the-counter Debrox drops in bilateral ears to prevent the build up of ear wax is recommended.  Do not use Q-tips in ear canal, this will impact the wax.      Your provider discussed your plan of care with you during your physical exam. It was reviewed once more by the provider when giving you an after visit summary. If the patient is a minor, the discharge instructions were discussed with an adult and that adult acknowledge their understanding of the provider's teaching.    "

## 2020-02-14 NOTE — PROGRESS NOTES
"Subjective:       Patient ID: Paddy Nair is a 64 y.o. male.    Vitals:  height is 6' 1" (1.854 m) and weight is 95.3 kg (210 lb). His temperature is 96.8 °F (36 °C). His blood pressure is 136/71 and his pulse is 71. His respiration is 18 and oxygen saturation is 98%.     Chief Complaint: Ear Fullness    This is a 64 y.o. male who presents today with a chief complaint of left ear fullness for 2 days.      Ear Fullness    There is pain in the left ear. This is a new problem. The current episode started in the past 7 days. The problem occurs constantly. The problem has been gradually worsening. There has been no fever. The pain is at a severity of 0/10. The patient is experiencing no pain. Pertinent negatives include no coughing, diarrhea, headaches, rash, sore throat or vomiting. He has tried nothing for the symptoms. His past medical history is significant for hearing loss. There is no history of a chronic ear infection or a tympanostomy tube.       Constitution: Negative for chills, fatigue and fever.   HENT: Negative for congestion and sore throat.         Left ear muffled   Neck: Negative for painful lymph nodes.   Cardiovascular: Negative for chest pain and leg swelling.   Eyes: Negative for double vision and blurred vision.   Respiratory: Negative for cough and shortness of breath.    Gastrointestinal: Negative for nausea, vomiting and diarrhea.   Genitourinary: Negative for dysuria, frequency and urgency.   Musculoskeletal: Negative for joint pain, joint swelling, muscle cramps and muscle ache.   Skin: Negative for color change, pale and rash.   Allergic/Immunologic: Negative for seasonal allergies.   Neurological: Negative for dizziness, history of vertigo, light-headedness, passing out and headaches.   Hematologic/Lymphatic: Negative for swollen lymph nodes, easy bruising/bleeding and history of blood clots. Does not bruise/bleed easily.   Psychiatric/Behavioral: Negative for nervous/anxious, " sleep disturbance and depression. The patient is not nervous/anxious.        Objective:      Physical Exam   Constitutional: He is oriented to person, place, and time. He appears well-developed and well-nourished. He is cooperative.  Non-toxic appearance. He does not appear ill. No distress.   HENT:   Head: Normocephalic and atraumatic.   Right Ear: Hearing, tympanic membrane, external ear and ear canal normal.   Left Ear: Hearing, tympanic membrane, external ear and ear canal normal. There is cerumen present.   Nose: Nose normal. No mucosal edema, rhinorrhea or nasal deformity. No epistaxis. Right sinus exhibits no maxillary sinus tenderness and no frontal sinus tenderness. Left sinus exhibits no maxillary sinus tenderness and no frontal sinus tenderness.   Mouth/Throat: Uvula is midline, oropharynx is clear and moist and mucous membranes are normal. No trismus in the jaw. Normal dentition. No uvula swelling. No posterior oropharyngeal erythema.   Eyes: Conjunctivae and lids are normal. Right eye exhibits no discharge. Left eye exhibits no discharge. No scleral icterus.   Neck: Trachea normal, normal range of motion, full passive range of motion without pain and phonation normal. Neck supple.   Cardiovascular: Normal rate, regular rhythm, normal heart sounds, intact distal pulses and normal pulses.   Pulmonary/Chest: Effort normal and breath sounds normal. No respiratory distress.   Abdominal: Soft. Normal appearance and bowel sounds are normal. He exhibits no distension, no pulsatile midline mass and no mass. There is no tenderness.   Musculoskeletal: Normal range of motion. He exhibits no edema or deformity.   Neurological: He is alert and oriented to person, place, and time. He exhibits normal muscle tone. Coordination normal.   Skin: Skin is warm, dry, intact, not diaphoretic and not pale.   Psychiatric: He has a normal mood and affect. His speech is normal and behavior is normal. Judgment and thought content  "normal. Cognition and memory are normal.   Nursing note and vitals reviewed.        Assessment:       1. Otalgia of left ear    2. Nasal congestion    3. Impacted cerumen of left ear        Plan:         Otalgia of left ear    Nasal congestion    Impacted cerumen of left ear  -     Ear wax removal         Patient Instructions   Always follow your healthcare professional's instructions.    You have received urgent care diagnosis and treatment and you may be released before all of your medical problems are known or treated. Unless you have been given a referral, you (the patient), will arrange for follow-up care as instructed.     If you have been given a referral, [facility] will contact you (their phone number is [phone]). If they do NOT call you by the end of the business day, please call them the following day.  Cerumen Removal    During your exam, wax was flushed out of your ear canal.  To prevent a "swimmer's ear" from the flush, please use 2-4 drops of a 1:1 solution of rubbing alcohol and distilled white vinegar one time.      Unscented mineral oil 2-4 drops monthly or over-the-counter Debrox drops in bilateral ears to prevent the build up of ear wax is recommended.  Do not use Q-tips in ear canal, this will impact the wax.      Your provider discussed your plan of care with you during your physical exam. It was reviewed once more by the provider when giving you an after visit summary. If the patient is a minor, the discharge instructions were discussed with an adult and that adult acknowledge their understanding of the provider's teaching.        "

## 2020-02-15 NOTE — PROCEDURES
Ear Cerumen Removal  Date/Time: 2/14/2020 9:15 AM  Performed by: Aria Miranda NP  Authorized by: Aria Miranda NP     Consent Done?:  Yes (Verbal)    Local anesthetic:  None  Medication Used:  Other  Location details:  Left ear  Procedure type: irrigation    Cerumen  Removal Results:  Cerumen completely removed  Patient tolerance:  Patient tolerated the procedure well with no immediate complications

## 2020-02-17 ENCOUNTER — TELEPHONE (OUTPATIENT)
Dept: URGENT CARE | Facility: CLINIC | Age: 65
End: 2020-02-17

## 2020-03-13 ENCOUNTER — PATIENT MESSAGE (OUTPATIENT)
Dept: UROLOGY | Facility: CLINIC | Age: 65
End: 2020-03-13

## 2020-03-17 ENCOUNTER — PATIENT OUTREACH (OUTPATIENT)
Dept: ADMINISTRATIVE | Facility: OTHER | Age: 65
End: 2020-03-17

## 2020-03-23 ENCOUNTER — PATIENT MESSAGE (OUTPATIENT)
Dept: UROLOGY | Facility: CLINIC | Age: 65
End: 2020-03-23

## 2020-03-23 ENCOUNTER — PATIENT OUTREACH (OUTPATIENT)
Dept: ADMINISTRATIVE | Facility: OTHER | Age: 65
End: 2020-03-23

## 2020-03-24 ENCOUNTER — PATIENT MESSAGE (OUTPATIENT)
Dept: UROLOGY | Facility: CLINIC | Age: 65
End: 2020-03-24

## 2020-03-27 ENCOUNTER — TELEPHONE (OUTPATIENT)
Dept: UROLOGY | Facility: CLINIC | Age: 65
End: 2020-03-27

## 2020-03-27 NOTE — TELEPHONE ENCOUNTER
----- Message from Trinh Mcmullen MA sent at 3/24/2020 11:03 AM CDT -----  Patient requesting to speak to you regarding his psa results.    He rescheduled his appt with you for may.

## 2020-05-26 ENCOUNTER — PATIENT MESSAGE (OUTPATIENT)
Dept: UROLOGY | Facility: CLINIC | Age: 65
End: 2020-05-26

## 2020-05-27 DIAGNOSIS — C61 PROSTATE CANCER: Primary | ICD-10-CM

## 2020-05-27 DIAGNOSIS — R97.20 ELEVATED PSA: ICD-10-CM

## 2020-05-27 DIAGNOSIS — R35.1 NOCTURIA: ICD-10-CM

## 2020-06-04 ENCOUNTER — PATIENT OUTREACH (OUTPATIENT)
Dept: ADMINISTRATIVE | Facility: OTHER | Age: 65
End: 2020-06-04

## 2020-06-09 ENCOUNTER — OFFICE VISIT (OUTPATIENT)
Dept: UROLOGY | Facility: CLINIC | Age: 65
End: 2020-06-09
Payer: COMMERCIAL

## 2020-06-09 VITALS
DIASTOLIC BLOOD PRESSURE: 77 MMHG | RESPIRATION RATE: 18 BRPM | WEIGHT: 210 LBS | HEART RATE: 71 BPM | SYSTOLIC BLOOD PRESSURE: 130 MMHG | TEMPERATURE: 99 F | BODY MASS INDEX: 27.83 KG/M2 | OXYGEN SATURATION: 98 % | HEIGHT: 73 IN

## 2020-06-09 DIAGNOSIS — Z80.42 FAMILY HISTORY OF PROSTATE CANCER IN FATHER: ICD-10-CM

## 2020-06-09 DIAGNOSIS — C61 PROSTATE CANCER: ICD-10-CM

## 2020-06-09 DIAGNOSIS — R97.20 ELEVATED PSA, LESS THAN 10 NG/ML: Primary | ICD-10-CM

## 2020-06-09 DIAGNOSIS — R35.0 URINARY FREQUENCY: ICD-10-CM

## 2020-06-09 DIAGNOSIS — R35.1 NOCTURIA: ICD-10-CM

## 2020-06-09 DIAGNOSIS — L72.3 SEBACEOUS CYST: ICD-10-CM

## 2020-06-09 PROCEDURE — 99999 PR PBB SHADOW E&M-EST. PATIENT-LVL III: ICD-10-PCS | Mod: PBBFAC,,, | Performed by: UROLOGY

## 2020-06-09 PROCEDURE — 99999 PR PBB SHADOW E&M-EST. PATIENT-LVL III: CPT | Mod: PBBFAC,,, | Performed by: UROLOGY

## 2020-06-09 PROCEDURE — 99214 PR OFFICE/OUTPT VISIT, EST, LEVL IV, 30-39 MIN: ICD-10-PCS | Mod: S$GLB,,, | Performed by: UROLOGY

## 2020-06-09 PROCEDURE — 99214 OFFICE O/P EST MOD 30 MIN: CPT | Mod: S$GLB,,, | Performed by: UROLOGY

## 2020-06-09 RX ORDER — METRONIDAZOLE 500 MG/1
500 TABLET ORAL EVERY 8 HOURS
Qty: 21 TABLET | Refills: 1 | Status: SHIPPED | OUTPATIENT
Start: 2020-06-09 | End: 2020-06-16

## 2020-06-09 RX ORDER — LEVOFLOXACIN 500 MG/1
500 TABLET, FILM COATED ORAL DAILY
Qty: 7 TABLET | Refills: 1 | Status: SHIPPED | OUTPATIENT
Start: 2020-06-09 | End: 2020-06-16

## 2020-06-09 NOTE — PROGRESS NOTES
Subjective:       Patient ID: Paddy Nair is a 64 y.o. male.    Chief Complaint: Elevated PSA    HPI  Review of Systems   Constitutional: Negative for activity change, appetite change, chills, diaphoresis, fatigue, fever and unexpected weight change.   HENT: Negative for congestion, hearing loss, sinus pressure and trouble swallowing.    Eyes: Negative for photophobia, pain, discharge and visual disturbance.   Respiratory: Negative for apnea, cough and shortness of breath.    Cardiovascular: Negative for chest pain, palpitations and leg swelling.   Gastrointestinal: Negative for abdominal distention, abdominal pain, anal bleeding, blood in stool, constipation, diarrhea, nausea, rectal pain and vomiting.   Endocrine: Negative for cold intolerance, heat intolerance, polydipsia, polyphagia and polyuria.   Genitourinary: Negative for decreased urine volume, difficulty urinating, discharge, dysuria, enuresis, flank pain, frequency, genital sores, hematuria, penile pain, penile swelling, scrotal swelling, testicular pain and urgency.   Musculoskeletal: Negative for arthralgias, back pain and myalgias.   Skin: Negative for color change, pallor, rash and wound.        Pruritic area where cyst was removed. No drainage.   Allergic/Immunologic: Negative for environmental allergies, food allergies and immunocompromised state.   Neurological: Negative for dizziness, seizures, weakness and headaches.   Hematological: Negative for adenopathy. Does not bruise/bleed easily.   Psychiatric/Behavioral: Negative.        Objective:      Physical Exam   Nursing note and vitals reviewed.  Constitutional: He is oriented to person, place, and time. He appears well-developed and well-nourished. No distress.   HENT:   Head: Normocephalic.   Nose: Nose normal.   Mouth/Throat: Oropharynx is clear and moist. No oropharyngeal exudate.   Eyes: Conjunctivae and EOM are normal. Pupils are equal, round, and reactive to light. Right eye  exhibits no discharge. Left eye exhibits no discharge. No scleral icterus.   Neck: Normal range of motion. Neck supple. No JVD present. No tracheal deviation present. No thyromegaly present.   Cardiovascular: Normal rate, regular rhythm, normal heart sounds and intact distal pulses.    Pulmonary/Chest: Effort normal and breath sounds normal. No stridor. No respiratory distress. He has no wheezes. He has no rales. He exhibits no tenderness.   Abdominal: Soft. Bowel sounds are normal. He exhibits no distension and no mass. There is no tenderness. There is no rebound and no guarding. No hernia.   Genitourinary: Rectum normal and penis normal.       Genitourinary Comments: Prostate absent, Area of prior cyst drainage is pruritic but no erythema or drainage   Musculoskeletal: Normal range of motion. He exhibits no edema, tenderness or deformity.   Lymphadenopathy:     He has no cervical adenopathy.   Neurological: He is alert and oriented to person, place, and time. He has normal reflexes. He displays normal reflexes. No cranial nerve deficit or sensory deficit. He exhibits normal muscle tone. Coordination normal.   Skin: Skin is warm and dry. No rash noted. He is not diaphoretic. No erythema.     Psychiatric: He has a normal mood and affect. His behavior is normal. Judgment and thought content normal.       Assessment:       1. Elevated PSA, less than 10 ng/ml    2. Prostate cancer    3. Family history of prostate cancer in father    4. Nocturia    5. Urinary frequency    6. Sebaceous cyst        Plan:           Patient Instructions   1 week of Levaquin and 1 week of Flagyl  F/U 3 mo with PSA

## 2020-06-30 ENCOUNTER — PATIENT OUTREACH (OUTPATIENT)
Dept: ADMINISTRATIVE | Facility: OTHER | Age: 65
End: 2020-06-30

## 2020-07-01 ENCOUNTER — OFFICE VISIT (OUTPATIENT)
Dept: SLEEP MEDICINE | Facility: CLINIC | Age: 65
End: 2020-07-01
Payer: COMMERCIAL

## 2020-07-01 DIAGNOSIS — G47.33 OSA (OBSTRUCTIVE SLEEP APNEA): Primary | ICD-10-CM

## 2020-07-01 PROCEDURE — 99212 PR OFFICE/OUTPT VISIT, EST, LEVL II, 10-19 MIN: ICD-10-PCS | Mod: 95,,, | Performed by: INTERNAL MEDICINE

## 2020-07-01 PROCEDURE — 99212 OFFICE O/P EST SF 10 MIN: CPT | Mod: 95,,, | Performed by: INTERNAL MEDICINE

## 2020-07-01 NOTE — PROGRESS NOTES
Subjective:       Patient ID: Paddy Nair is a 64 y.o. male.    Chief Complaint: Sleeping Problem    HPI     Paddy Nair returns for PAP follow up for compliance documentation    Paddy Nair has a history of moderate Obstructive Sleep Apnea diagnosed in 2018 (AHI 22).   The last PAP titration was NA with AHI NA at NA cm H20.   Device is 2 year(s) old.   Current setting 5-12 cm H20.   DME is [facility].      Paddy Nair is using CPAP 100% of nights and averages 8 hours and 31 minutes.   Device use greater than 4 hours is 100%.   Patient does not have problems with the pressure setting.   Mask interface issues are not experienced.   A full face mask interface is used.   The patient does not experience side effects from therapy.   The patient experiences the following benefits of therapy:  more rested, reduced morbidity, reduced accident risk, reduced mortality and reduced cardiovascular risk   There are not equipment issues.   Mean pressure 8.6, average peak pressure 10.8 and 90th percentile pressure 11.6.   Estimated AHI 5.        Scheduled Meds:  Continuous Infusions:  PRN Meds:.           Importance of PAP compliance discussed.   Care and use of equipment discussed.   Download and relevant sleep studies reviewed with patient    Discussed therapeutic goals for positive airway pressure therapy(CPAP or BiPAP).  Ideal is usage 100% of nights for at least 6 hours per night.  Minimum usage is 70% of night for at least 4 hours per night used    The patient was given open opportunity to ask questions and/or express concerns about treatment plan.   All questions/concerns were discussed.            Review of Systems      Objective:      Physical Exam    Assessment:       1. GUILLERMO (obstructive sleep apnea)        Plan:       Replacement supplies ordered.   Increase minimum pressure to 7 with repeat download.       The patient location is: home  The chief complaint leading to consultation  is: CPAP use    Visit type: audiovisual    Face to Face time with patient:   12 minutes of total time spent on the encounter, which includes face to face time and non-face to face time preparing to see the patient (eg, review of tests), Obtaining and/or reviewing separately obtained history, Documenting clinical information in the electronic or other health record, Independently interpreting results (not separately reported) and communicating results to the patient/family/caregiver, or Care coordination (not separately reported).         Each patient to whom he or she provides medical services by telemedicine is:  (1) informed of the relationship between the physician and patient and the respective role of any other health care provider with respect to management of the patient; and (2) notified that he or she may decline to receive medical services by telemedicine and may withdraw from such care at any time.    Notes:

## 2020-07-01 NOTE — PROGRESS NOTES
Chart reviewed.   Immunizations: Triggered Imm Registry     Orders placed: n/a  Upcoming appts to satisfy [facility] topics: n/a

## 2020-09-17 ENCOUNTER — PATIENT MESSAGE (OUTPATIENT)
Dept: UROLOGY | Facility: CLINIC | Age: 65
End: 2020-09-17

## 2020-09-25 ENCOUNTER — PATIENT MESSAGE (OUTPATIENT)
Dept: UROLOGY | Facility: CLINIC | Age: 65
End: 2020-09-25

## 2020-10-28 ENCOUNTER — PATIENT MESSAGE (OUTPATIENT)
Dept: UROLOGY | Facility: CLINIC | Age: 65
End: 2020-10-28

## 2020-10-29 ENCOUNTER — PATIENT MESSAGE (OUTPATIENT)
Dept: UROLOGY | Facility: CLINIC | Age: 65
End: 2020-10-29

## 2020-10-30 ENCOUNTER — PATIENT MESSAGE (OUTPATIENT)
Dept: UROLOGY | Facility: CLINIC | Age: 65
End: 2020-10-30

## 2020-12-11 ENCOUNTER — PATIENT MESSAGE (OUTPATIENT)
Dept: UROLOGY | Facility: CLINIC | Age: 65
End: 2020-12-11

## 2020-12-11 DIAGNOSIS — Z80.42 FAMILY HISTORY OF PROSTATE CANCER IN FATHER: ICD-10-CM

## 2020-12-11 DIAGNOSIS — C61 PROSTATE CANCER: ICD-10-CM

## 2020-12-11 DIAGNOSIS — R97.20 ELEVATED PSA, LESS THAN 10 NG/ML: Primary | ICD-10-CM

## 2020-12-15 ENCOUNTER — OFFICE VISIT (OUTPATIENT)
Dept: UROLOGY | Facility: CLINIC | Age: 65
End: 2020-12-15
Payer: COMMERCIAL

## 2020-12-15 VITALS — HEIGHT: 73 IN | BODY MASS INDEX: 27.71 KG/M2 | TEMPERATURE: 98 F

## 2020-12-15 DIAGNOSIS — Z98.890 POSTOPERATIVE STATE: ICD-10-CM

## 2020-12-15 DIAGNOSIS — R97.20 ELEVATED PSA: Primary | ICD-10-CM

## 2020-12-15 DIAGNOSIS — R35.0 URINARY FREQUENCY: ICD-10-CM

## 2020-12-15 DIAGNOSIS — C61 PROSTATE CANCER: ICD-10-CM

## 2020-12-15 PROCEDURE — 99999 PR PBB SHADOW E&M-EST. PATIENT-LVL III: ICD-10-PCS | Mod: PBBFAC,,, | Performed by: UROLOGY

## 2020-12-15 PROCEDURE — 99214 OFFICE O/P EST MOD 30 MIN: CPT | Mod: S$GLB,,, | Performed by: UROLOGY

## 2020-12-15 PROCEDURE — 99999 PR PBB SHADOW E&M-EST. PATIENT-LVL III: CPT | Mod: PBBFAC,,, | Performed by: UROLOGY

## 2020-12-15 PROCEDURE — 99214 PR OFFICE/OUTPT VISIT, EST, LEVL IV, 30-39 MIN: ICD-10-PCS | Mod: S$GLB,,, | Performed by: UROLOGY

## 2020-12-15 RX ORDER — TADALAFIL 20 MG/1
20 TABLET ORAL DAILY PRN
Qty: 30 TABLET | Refills: 11 | Status: SHIPPED | OUTPATIENT
Start: 2020-12-15 | End: 2021-10-20 | Stop reason: SDUPTHER

## 2020-12-15 NOTE — PROGRESS NOTES
Subjective:       Patient ID: Paddy Nair is a 65 y.o. male.    Chief Complaint: No chief complaint on file.    66 yo WM with a history of radical retropubic prostatectomy 15 months ago.  The patient's PSA initially dropped to 0.06 and then increased up to 0.15 and is now decreased back down to 0.12.  The patient has been showing decrease over the last 6 months.  Will monitor in 3 months and repeat PSA.  Path reviewed and no evidence of extraprostatic disease seen on pathology report.    Other  This is a chronic (Prostate Cancer) problem. The current episode started more than 1 year ago. The problem occurs rarely. The problem has been gradually improving. Pertinent negatives include no abdominal pain, anorexia, arthralgias, change in bowel habit, chest pain, chills, congestion, coughing, diaphoresis, fatigue, fever, headaches, joint swelling, myalgias, nausea, neck pain, numbness, rash, sore throat, swollen glands, urinary symptoms, vertigo, visual change, vomiting or weakness. Nothing aggravates the symptoms. He has tried nothing for the symptoms. The treatment provided significant relief.     Review of Systems   Constitutional: Negative for activity change, appetite change, chills, diaphoresis, fatigue, fever and unexpected weight change.   HENT: Negative for congestion, hearing loss, sinus pressure, sore throat and trouble swallowing.    Eyes: Negative for photophobia, pain, discharge and visual disturbance.   Respiratory: Negative for apnea, cough and shortness of breath.    Cardiovascular: Negative for chest pain, palpitations and leg swelling.   Gastrointestinal: Negative for abdominal distention, abdominal pain, anal bleeding, anorexia, blood in stool, change in bowel habit, constipation, diarrhea, nausea, rectal pain and vomiting.   Endocrine: Negative for cold intolerance, heat intolerance, polydipsia, polyphagia and polyuria.   Genitourinary: Negative for decreased urine volume, difficulty  urinating, discharge, dysuria, enuresis, flank pain, frequency, genital sores, hematuria, penile pain, penile swelling, scrotal swelling, testicular pain and urgency.   Musculoskeletal: Negative for arthralgias, back pain, joint swelling, myalgias and neck pain.   Skin: Negative for color change, pallor, rash and wound.   Allergic/Immunologic: Negative for environmental allergies, food allergies and immunocompromised state.   Neurological: Negative for dizziness, vertigo, seizures, weakness, numbness and headaches.   Hematological: Negative for adenopathy. Does not bruise/bleed easily.   Psychiatric/Behavioral: Negative.        Objective:      Physical Exam  Vitals signs and nursing note reviewed.   Constitutional:       General: He is not in acute distress.     Appearance: Normal appearance. He is well-developed. He is obese. He is not ill-appearing, toxic-appearing or diaphoretic.   HENT:      Head: Normocephalic.      Right Ear: External ear normal. There is no impacted cerumen.      Left Ear: External ear normal. There is no impacted cerumen.      Nose: Nose normal. No congestion or rhinorrhea.      Mouth/Throat:      Mouth: Mucous membranes are moist.      Pharynx: Oropharynx is clear. No oropharyngeal exudate or posterior oropharyngeal erythema.   Eyes:      General: No scleral icterus.        Right eye: No discharge.         Left eye: No discharge.      Extraocular Movements: Extraocular movements intact.      Conjunctiva/sclera: Conjunctivae normal.      Pupils: Pupils are equal, round, and reactive to light.   Neck:      Musculoskeletal: Normal range of motion and neck supple. No neck rigidity or muscular tenderness.      Vascular: No carotid bruit.   Cardiovascular:      Rate and Rhythm: Normal rate and regular rhythm.      Pulses: Normal pulses.      Heart sounds: Normal heart sounds.   Pulmonary:      Effort: Pulmonary effort is normal. No respiratory distress.      Breath sounds: Normal breath sounds.  No stridor. No wheezing, rhonchi or rales.   Chest:      Chest wall: No tenderness.   Abdominal:      General: Bowel sounds are normal. There is no distension.      Palpations: Abdomen is soft. There is no mass.      Tenderness: There is no abdominal tenderness. There is no right CVA tenderness, guarding or rebound.      Hernia: No hernia is present.   Genitourinary:     Penis: Normal.       Scrotum/Testes: Normal.   Musculoskeletal: Normal range of motion.         General: No swelling, tenderness, deformity or signs of injury.      Right lower leg: No edema.      Left lower leg: No edema.   Lymphadenopathy:      Cervical: No cervical adenopathy.   Skin:     General: Skin is warm and dry.      Capillary Refill: Capillary refill takes less than 2 seconds.      Coloration: Skin is not jaundiced or pale.      Findings: No bruising, erythema, lesion or rash.   Neurological:      Mental Status: He is alert and oriented to person, place, and time.      Cranial Nerves: No cranial nerve deficit.      Sensory: No sensory deficit.      Motor: No weakness.      Coordination: Coordination normal.      Gait: Gait normal.      Deep Tendon Reflexes: Reflexes are normal and symmetric. Reflexes normal.   Psychiatric:         Mood and Affect: Mood normal.         Behavior: Behavior normal.         Thought Content: Thought content normal.         Judgment: Judgment normal.         Assessment:       1. Elevated PSA    2. Prostate cancer    3. Postoperative state    4. Urinary frequency        Plan:           Patient Instructions   Follow-up in 3 months with a PSA free and total

## 2021-01-20 ENCOUNTER — OFFICE VISIT (OUTPATIENT)
Dept: FAMILY MEDICINE | Facility: CLINIC | Age: 66
End: 2021-01-20
Payer: COMMERCIAL

## 2021-01-20 VITALS
TEMPERATURE: 98 F | RESPIRATION RATE: 18 BRPM | HEART RATE: 73 BPM | BODY MASS INDEX: 28.81 KG/M2 | HEIGHT: 73 IN | OXYGEN SATURATION: 97 % | WEIGHT: 217.38 LBS | SYSTOLIC BLOOD PRESSURE: 124 MMHG | DIASTOLIC BLOOD PRESSURE: 78 MMHG

## 2021-01-20 DIAGNOSIS — Z12.11 ENCOUNTER FOR COLORECTAL CANCER SCREENING: ICD-10-CM

## 2021-01-20 DIAGNOSIS — C61 PROSTATE CANCER: ICD-10-CM

## 2021-01-20 DIAGNOSIS — Z12.12 ENCOUNTER FOR COLORECTAL CANCER SCREENING: ICD-10-CM

## 2021-01-20 DIAGNOSIS — G47.33 OSA (OBSTRUCTIVE SLEEP APNEA): ICD-10-CM

## 2021-01-20 DIAGNOSIS — Z00.00 ANNUAL PHYSICAL EXAM: Primary | ICD-10-CM

## 2021-01-20 PROCEDURE — 99396 PR PREVENTIVE VISIT,EST,40-64: ICD-10-PCS | Mod: S$GLB,,, | Performed by: INTERNAL MEDICINE

## 2021-01-20 PROCEDURE — 99396 PREV VISIT EST AGE 40-64: CPT | Mod: S$GLB,,, | Performed by: INTERNAL MEDICINE

## 2021-01-20 PROCEDURE — 99999 PR PBB SHADOW E&M-EST. PATIENT-LVL III: CPT | Mod: PBBFAC,,, | Performed by: INTERNAL MEDICINE

## 2021-01-20 PROCEDURE — 99999 PR PBB SHADOW E&M-EST. PATIENT-LVL III: ICD-10-PCS | Mod: PBBFAC,,, | Performed by: INTERNAL MEDICINE

## 2021-01-21 ENCOUNTER — PATIENT MESSAGE (OUTPATIENT)
Dept: FAMILY MEDICINE | Facility: CLINIC | Age: 66
End: 2021-01-21

## 2021-01-21 DIAGNOSIS — Z00.00 ANNUAL PHYSICAL EXAM: Primary | ICD-10-CM

## 2021-01-21 DIAGNOSIS — Z12.12 ENCOUNTER FOR COLORECTAL CANCER SCREENING: ICD-10-CM

## 2021-01-21 DIAGNOSIS — Z12.11 ENCOUNTER FOR COLORECTAL CANCER SCREENING: ICD-10-CM

## 2021-01-22 ENCOUNTER — PATIENT MESSAGE (OUTPATIENT)
Dept: FAMILY MEDICINE | Facility: CLINIC | Age: 66
End: 2021-01-22

## 2021-01-22 DIAGNOSIS — R73.02 IMPAIRED GLUCOSE TOLERANCE: Primary | ICD-10-CM

## 2021-01-31 LAB — GASTROCULT GAST QL: NEGATIVE

## 2021-02-05 ENCOUNTER — PATIENT OUTREACH (OUTPATIENT)
Dept: ADMINISTRATIVE | Facility: HOSPITAL | Age: 66
End: 2021-02-05

## 2021-02-05 ENCOUNTER — TELEPHONE (OUTPATIENT)
Dept: ADMINISTRATIVE | Facility: HOSPITAL | Age: 66
End: 2021-02-05

## 2021-03-11 ENCOUNTER — IMMUNIZATION (OUTPATIENT)
Dept: FAMILY MEDICINE | Facility: CLINIC | Age: 66
End: 2021-03-11
Payer: COMMERCIAL

## 2021-03-11 DIAGNOSIS — Z23 NEED FOR VACCINATION: Primary | ICD-10-CM

## 2021-03-11 PROCEDURE — 91300 COVID-19, MRNA, LNP-S, PF, 30 MCG/0.3 ML DOSE VACCINE: CPT | Mod: PBBFAC | Performed by: FAMILY MEDICINE

## 2021-03-19 ENCOUNTER — PATIENT OUTREACH (OUTPATIENT)
Dept: ADMINISTRATIVE | Facility: OTHER | Age: 66
End: 2021-03-19

## 2021-03-23 ENCOUNTER — OFFICE VISIT (OUTPATIENT)
Dept: UROLOGY | Facility: CLINIC | Age: 66
End: 2021-03-23
Payer: COMMERCIAL

## 2021-03-23 VITALS
DIASTOLIC BLOOD PRESSURE: 70 MMHG | TEMPERATURE: 98 F | SYSTOLIC BLOOD PRESSURE: 128 MMHG | HEIGHT: 73 IN | OXYGEN SATURATION: 97 % | RESPIRATION RATE: 18 BRPM | WEIGHT: 224.88 LBS | BODY MASS INDEX: 29.8 KG/M2 | HEART RATE: 73 BPM

## 2021-03-23 DIAGNOSIS — R97.20 ELEVATED PSA: Primary | ICD-10-CM

## 2021-03-23 DIAGNOSIS — Z85.46 HISTORY OF PROSTATE CANCER: ICD-10-CM

## 2021-03-23 PROCEDURE — 99213 PR OFFICE/OUTPT VISIT, EST, LEVL III, 20-29 MIN: ICD-10-PCS | Mod: S$GLB,,, | Performed by: UROLOGY

## 2021-03-23 PROCEDURE — 99213 OFFICE O/P EST LOW 20 MIN: CPT | Mod: S$GLB,,, | Performed by: UROLOGY

## 2021-03-23 PROCEDURE — 99999 PR PBB SHADOW E&M-EST. PATIENT-LVL III: ICD-10-PCS | Mod: PBBFAC,,, | Performed by: UROLOGY

## 2021-03-23 PROCEDURE — 99999 PR PBB SHADOW E&M-EST. PATIENT-LVL III: CPT | Mod: PBBFAC,,, | Performed by: UROLOGY

## 2021-04-01 ENCOUNTER — IMMUNIZATION (OUTPATIENT)
Dept: FAMILY MEDICINE | Facility: CLINIC | Age: 66
End: 2021-04-01
Payer: COMMERCIAL

## 2021-04-01 DIAGNOSIS — Z23 NEED FOR VACCINATION: Primary | ICD-10-CM

## 2021-04-01 PROCEDURE — 0002A COVID-19, MRNA, LNP-S, PF, 30 MCG/0.3 ML DOSE VACCINE: CPT | Mod: PBBFAC | Performed by: FAMILY MEDICINE

## 2021-04-01 PROCEDURE — 91300 COVID-19, MRNA, LNP-S, PF, 30 MCG/0.3 ML DOSE VACCINE: CPT | Mod: PBBFAC | Performed by: FAMILY MEDICINE

## 2021-04-26 PROBLEM — Z00.00 ANNUAL PHYSICAL EXAM: Status: RESOLVED | Noted: 2021-01-20 | Resolved: 2021-04-26

## 2021-06-15 ENCOUNTER — PATIENT MESSAGE (OUTPATIENT)
Dept: SLEEP MEDICINE | Facility: CLINIC | Age: 66
End: 2021-06-15

## 2021-06-16 ENCOUNTER — PATIENT MESSAGE (OUTPATIENT)
Dept: SLEEP MEDICINE | Facility: CLINIC | Age: 66
End: 2021-06-16

## 2021-06-16 DIAGNOSIS — G47.33 OSA (OBSTRUCTIVE SLEEP APNEA): Primary | ICD-10-CM

## 2021-06-24 ENCOUNTER — PATIENT OUTREACH (OUTPATIENT)
Dept: ADMINISTRATIVE | Facility: OTHER | Age: 66
End: 2021-06-24

## 2021-06-25 ENCOUNTER — OFFICE VISIT (OUTPATIENT)
Dept: UROLOGY | Facility: CLINIC | Age: 66
End: 2021-06-25
Payer: COMMERCIAL

## 2021-06-25 VITALS
RESPIRATION RATE: 18 BRPM | SYSTOLIC BLOOD PRESSURE: 118 MMHG | OXYGEN SATURATION: 98 % | DIASTOLIC BLOOD PRESSURE: 68 MMHG | BODY MASS INDEX: 29.82 KG/M2 | WEIGHT: 225 LBS | HEIGHT: 73 IN | HEART RATE: 65 BPM

## 2021-06-25 DIAGNOSIS — Z80.42 FAMILY HISTORY OF PROSTATE CANCER IN FATHER: ICD-10-CM

## 2021-06-25 DIAGNOSIS — Z85.46 HISTORY OF PROSTATE CANCER: Primary | ICD-10-CM

## 2021-06-25 DIAGNOSIS — R97.20 ELEVATED PSA, LESS THAN 10 NG/ML: ICD-10-CM

## 2021-06-25 DIAGNOSIS — R97.20 ELEVATED PSA: ICD-10-CM

## 2021-06-25 DIAGNOSIS — N52.03 COMBINED ARTERIAL INSUFFICIENCY AND CORPORO-VENOUS OCCLUSIVE ERECTILE DYSFUNCTION: ICD-10-CM

## 2021-06-25 PROCEDURE — 99999 PR PBB SHADOW E&M-EST. PATIENT-LVL III: CPT | Mod: PBBFAC,,, | Performed by: UROLOGY

## 2021-06-25 PROCEDURE — 99999 PR PBB SHADOW E&M-EST. PATIENT-LVL III: ICD-10-PCS | Mod: PBBFAC,,, | Performed by: UROLOGY

## 2021-06-25 PROCEDURE — 99214 OFFICE O/P EST MOD 30 MIN: CPT | Mod: S$GLB,,, | Performed by: UROLOGY

## 2021-06-25 PROCEDURE — 99214 PR OFFICE/OUTPT VISIT, EST, LEVL IV, 30-39 MIN: ICD-10-PCS | Mod: S$GLB,,, | Performed by: UROLOGY

## 2021-07-22 ENCOUNTER — OFFICE VISIT (OUTPATIENT)
Dept: FAMILY MEDICINE | Facility: CLINIC | Age: 66
End: 2021-07-22
Payer: COMMERCIAL

## 2021-07-22 VITALS
WEIGHT: 221.25 LBS | RESPIRATION RATE: 18 BRPM | SYSTOLIC BLOOD PRESSURE: 130 MMHG | DIASTOLIC BLOOD PRESSURE: 80 MMHG | OXYGEN SATURATION: 96 % | HEART RATE: 82 BPM | BODY MASS INDEX: 29.32 KG/M2 | TEMPERATURE: 98 F | HEIGHT: 73 IN

## 2021-07-22 DIAGNOSIS — R35.1 BENIGN PROSTATIC HYPERPLASIA WITH NOCTURIA: ICD-10-CM

## 2021-07-22 DIAGNOSIS — N40.1 BENIGN PROSTATIC HYPERPLASIA WITH NOCTURIA: ICD-10-CM

## 2021-07-22 DIAGNOSIS — R73.02 IMPAIRED GLUCOSE TOLERANCE: Primary | ICD-10-CM

## 2021-07-22 DIAGNOSIS — G47.33 OSA (OBSTRUCTIVE SLEEP APNEA): ICD-10-CM

## 2021-07-22 DIAGNOSIS — C61 PROSTATE CANCER: ICD-10-CM

## 2021-07-22 PROCEDURE — 99214 OFFICE O/P EST MOD 30 MIN: CPT | Mod: S$GLB,,, | Performed by: INTERNAL MEDICINE

## 2021-07-22 PROCEDURE — 99214 PR OFFICE/OUTPT VISIT, EST, LEVL IV, 30-39 MIN: ICD-10-PCS | Mod: S$GLB,,, | Performed by: INTERNAL MEDICINE

## 2021-07-22 PROCEDURE — 99999 PR PBB SHADOW E&M-EST. PATIENT-LVL III: CPT | Mod: PBBFAC,,, | Performed by: INTERNAL MEDICINE

## 2021-07-22 PROCEDURE — 99999 PR PBB SHADOW E&M-EST. PATIENT-LVL III: ICD-10-PCS | Mod: PBBFAC,,, | Performed by: INTERNAL MEDICINE

## 2021-10-02 ENCOUNTER — PATIENT MESSAGE (OUTPATIENT)
Dept: SLEEP MEDICINE | Facility: CLINIC | Age: 66
End: 2021-10-02

## 2021-10-05 ENCOUNTER — IMMUNIZATION (OUTPATIENT)
Dept: FAMILY MEDICINE | Facility: CLINIC | Age: 66
End: 2021-10-05
Payer: MEDICARE

## 2021-10-05 DIAGNOSIS — Z23 NEED FOR VACCINATION: Primary | ICD-10-CM

## 2021-10-05 PROCEDURE — 91300 COVID-19, MRNA, LNP-S, PF, 30 MCG/0.3 ML DOSE VACCINE: CPT | Mod: PBBFAC,PN

## 2021-10-05 PROCEDURE — 0003A COVID-19, MRNA, LNP-S, PF, 30 MCG/0.3 ML DOSE VACCINE: CPT | Mod: PBBFAC,PN

## 2021-10-07 ENCOUNTER — TELEPHONE (OUTPATIENT)
Dept: SLEEP MEDICINE | Facility: CLINIC | Age: 66
End: 2021-10-07

## 2021-10-08 ENCOUNTER — OFFICE VISIT (OUTPATIENT)
Dept: SLEEP MEDICINE | Facility: CLINIC | Age: 66
End: 2021-10-08
Payer: MEDICARE

## 2021-10-08 DIAGNOSIS — G47.33 OSA (OBSTRUCTIVE SLEEP APNEA): Primary | ICD-10-CM

## 2021-10-08 PROCEDURE — 99212 OFFICE O/P EST SF 10 MIN: CPT | Mod: 95,,, | Performed by: INTERNAL MEDICINE

## 2021-10-08 PROCEDURE — 99212 PR OFFICE/OUTPT VISIT, EST, LEVL II, 10-19 MIN: ICD-10-PCS | Mod: 95,,, | Performed by: INTERNAL MEDICINE

## 2021-10-18 ENCOUNTER — PATIENT MESSAGE (OUTPATIENT)
Dept: SLEEP MEDICINE | Facility: CLINIC | Age: 66
End: 2021-10-18
Payer: MEDICARE

## 2021-10-19 ENCOUNTER — PATIENT OUTREACH (OUTPATIENT)
Dept: ADMINISTRATIVE | Facility: OTHER | Age: 66
End: 2021-10-19

## 2021-10-20 ENCOUNTER — OFFICE VISIT (OUTPATIENT)
Dept: UROLOGY | Facility: CLINIC | Age: 66
End: 2021-10-20
Payer: MEDICARE

## 2021-10-20 VITALS
BODY MASS INDEX: 29.31 KG/M2 | DIASTOLIC BLOOD PRESSURE: 72 MMHG | HEART RATE: 73 BPM | WEIGHT: 221.19 LBS | SYSTOLIC BLOOD PRESSURE: 125 MMHG | OXYGEN SATURATION: 98 % | HEIGHT: 73 IN

## 2021-10-20 DIAGNOSIS — Z85.46 HISTORY OF PROSTATE CANCER: Primary | ICD-10-CM

## 2021-10-20 DIAGNOSIS — N52.03 COMBINED ARTERIAL INSUFFICIENCY AND CORPORO-VENOUS OCCLUSIVE ERECTILE DYSFUNCTION: ICD-10-CM

## 2021-10-20 DIAGNOSIS — Z80.42 FAMILY HISTORY OF PROSTATE CANCER IN FATHER: ICD-10-CM

## 2021-10-20 PROCEDURE — 99999 PR PBB SHADOW E&M-EST. PATIENT-LVL III: ICD-10-PCS | Mod: PBBFAC,,, | Performed by: UROLOGY

## 2021-10-20 PROCEDURE — 99214 OFFICE O/P EST MOD 30 MIN: CPT | Mod: S$PBB,,, | Performed by: UROLOGY

## 2021-10-20 PROCEDURE — 99214 PR OFFICE/OUTPT VISIT, EST, LEVL IV, 30-39 MIN: ICD-10-PCS | Mod: S$PBB,,, | Performed by: UROLOGY

## 2021-10-20 PROCEDURE — 99999 PR PBB SHADOW E&M-EST. PATIENT-LVL III: CPT | Mod: PBBFAC,,, | Performed by: UROLOGY

## 2021-10-20 PROCEDURE — 99213 OFFICE O/P EST LOW 20 MIN: CPT | Mod: PBBFAC,PO | Performed by: UROLOGY

## 2021-10-20 RX ORDER — TADALAFIL 20 MG/1
20 TABLET ORAL DAILY PRN
Qty: 30 TABLET | Refills: 11 | Status: SHIPPED | OUTPATIENT
Start: 2021-10-20 | End: 2022-04-13 | Stop reason: SDUPTHER

## 2021-10-20 RX ORDER — HYDROCODONE BITARTRATE AND ACETAMINOPHEN 7.5; 325 MG/1; MG/1
1 TABLET ORAL
COMMUNITY
Start: 2021-05-27 | End: 2022-05-24

## 2021-10-20 RX ORDER — AMOXICILLIN 500 MG/1
500 CAPSULE ORAL 3 TIMES DAILY
COMMUNITY
Start: 2021-05-27 | End: 2022-05-24

## 2021-10-27 ENCOUNTER — PATIENT MESSAGE (OUTPATIENT)
Dept: UROLOGY | Facility: CLINIC | Age: 66
End: 2021-10-27
Payer: MEDICARE

## 2022-04-11 ENCOUNTER — PATIENT MESSAGE (OUTPATIENT)
Dept: UROLOGY | Facility: CLINIC | Age: 67
End: 2022-04-11
Payer: MEDICARE

## 2022-04-12 ENCOUNTER — PATIENT OUTREACH (OUTPATIENT)
Dept: ADMINISTRATIVE | Facility: OTHER | Age: 67
End: 2022-04-12
Payer: MEDICARE

## 2022-04-12 NOTE — PROGRESS NOTES
Health Maintenance Due   Topic Date Due    Pneumococcal Vaccines (Age 65+) (1 - PCV) Never done    HIV Screening  Never done    Shingles Vaccine (1 of 2) Never done    Abdominal Aortic Aneurysm Screening  Never done    Influenza Vaccine (1) 09/01/2021     Updates were requested from care everywhere.  Chart was reviewed for overdue Proactive [facility] Encounters ([facility]) topics (CRS, Breast Cancer Screening, Eye exam)  Health Maintenance has been updated.  LINKS immunization registry triggered.  Immunizations were reconciled.

## 2022-04-13 ENCOUNTER — OFFICE VISIT (OUTPATIENT)
Dept: UROLOGY | Facility: CLINIC | Age: 67
End: 2022-04-13
Payer: MEDICARE

## 2022-04-13 VITALS
HEART RATE: 84 BPM | BODY MASS INDEX: 29.22 KG/M2 | WEIGHT: 220.5 LBS | HEIGHT: 73 IN | DIASTOLIC BLOOD PRESSURE: 82 MMHG | SYSTOLIC BLOOD PRESSURE: 124 MMHG

## 2022-04-13 DIAGNOSIS — R35.1 NOCTURIA: ICD-10-CM

## 2022-04-13 DIAGNOSIS — R97.20 ELEVATED PSA: ICD-10-CM

## 2022-04-13 DIAGNOSIS — Z85.46 HISTORY OF PROSTATE CANCER: ICD-10-CM

## 2022-04-13 DIAGNOSIS — C61 PROSTATE CANCER: Primary | ICD-10-CM

## 2022-04-13 PROCEDURE — 99213 OFFICE O/P EST LOW 20 MIN: CPT | Mod: PBBFAC,PO | Performed by: UROLOGY

## 2022-04-13 PROCEDURE — 99999 PR PBB SHADOW E&M-EST. PATIENT-LVL III: CPT | Mod: PBBFAC,,, | Performed by: UROLOGY

## 2022-04-13 PROCEDURE — 99214 OFFICE O/P EST MOD 30 MIN: CPT | Mod: S$PBB,,, | Performed by: UROLOGY

## 2022-04-13 PROCEDURE — 99999 PR PBB SHADOW E&M-EST. PATIENT-LVL III: ICD-10-PCS | Mod: PBBFAC,,, | Performed by: UROLOGY

## 2022-04-13 PROCEDURE — 99214 PR OFFICE/OUTPT VISIT, EST, LEVL IV, 30-39 MIN: ICD-10-PCS | Mod: S$PBB,,, | Performed by: UROLOGY

## 2022-04-13 RX ORDER — TADALAFIL 20 MG/1
20 TABLET ORAL DAILY PRN
Qty: 30 TABLET | Refills: 11 | Status: SHIPPED | OUTPATIENT
Start: 2022-04-13 | End: 2022-10-14 | Stop reason: SDUPTHER

## 2022-04-13 NOTE — PROGRESS NOTES
Subjective:       Patient ID: Paddy Nair is a 66 y.o. male.    Chief Complaint: prostate check - 6 month     65 yo WM is 2.5 years status post radical retropubic prostatectomy.  The patient's most recent PSA was 0.16.  He has been undergoing quarterly PSA testing.  The patient's PSA is at a has remained stable and in fact is approximately the same as it was 2 years ago.  The patient has not had any other treatments other than the surgery.  Patient has good urinary control and has erectile dysfunction with assistance of Cialis which is not quite 100% but functional.    Follow-up  This is a chronic problem. The current episode started in the past 7 days. The problem occurs constantly. The problem has been resolved. Pertinent negatives include no abdominal pain, anorexia, arthralgias, change in bowel habit, chest pain, chills, congestion, coughing, diaphoresis, fatigue, fever, headaches, joint swelling, myalgias, nausea, neck pain, numbness, rash, sore throat, swollen glands, urinary symptoms, vertigo, visual change, vomiting or weakness. Nothing aggravates the symptoms. He has tried nothing for the symptoms. The treatment provided significant relief.     Review of Systems   Constitutional: Negative for chills, diaphoresis, fatigue and fever.   HENT: Negative for congestion and sore throat.    Respiratory: Negative for cough.    Cardiovascular: Negative for chest pain.   Gastrointestinal: Negative for abdominal pain, anorexia, change in bowel habit, nausea and vomiting.   Musculoskeletal: Negative for arthralgias, joint swelling, myalgias and neck pain.   Skin: Negative for rash.   Neurological: Negative for vertigo, weakness, numbness and headaches.       Objective:      Physical Exam  Vitals and nursing note reviewed.   Constitutional:       Appearance: Normal appearance. He is well-developed.   HENT:      Head: Normocephalic and atraumatic.      Right Ear: External ear normal.      Left Ear: External ear  normal.      Nose: Nose normal.      Mouth/Throat:      Mouth: Mucous membranes are moist.      Pharynx: No oropharyngeal exudate or posterior oropharyngeal erythema.   Eyes:      General: No scleral icterus.        Right eye: No discharge.         Left eye: No discharge.      Conjunctiva/sclera: Conjunctivae normal.      Pupils: Pupils are equal, round, and reactive to light.   Cardiovascular:      Rate and Rhythm: Normal rate and regular rhythm.      Pulses: Normal pulses.      Heart sounds: Normal heart sounds.   Pulmonary:      Effort: Pulmonary effort is normal.      Breath sounds: Normal breath sounds.   Abdominal:      General: Bowel sounds are normal.      Palpations: Abdomen is soft.      Tenderness: There is no right CVA tenderness or left CVA tenderness.   Genitourinary:     Penis: Normal.       Testes: Normal.   Musculoskeletal:         General: Normal range of motion.      Cervical back: Normal range of motion and neck supple.   Skin:     General: Skin is warm and dry.      Capillary Refill: Capillary refill takes less than 2 seconds.   Neurological:      Mental Status: He is alert and oriented to person, place, and time.      Deep Tendon Reflexes: Reflexes are normal and symmetric.   Psychiatric:         Mood and Affect: Mood normal.         Behavior: Behavior normal.         Thought Content: Thought content normal.         Judgment: Judgment normal.         Assessment:       1. Prostate cancer    2. History of prostate cancer    3. Nocturia    4. Elevated PSA        Plan:           Patient Instructions   PSA test in 6 months  Follow-up in 6 months  Refill Cialis

## 2022-05-16 ENCOUNTER — PATIENT MESSAGE (OUTPATIENT)
Dept: INTERNAL MEDICINE | Facility: CLINIC | Age: 67
End: 2022-05-16
Payer: MEDICARE

## 2022-05-16 DIAGNOSIS — E78.6 LOW HDL (UNDER 40): ICD-10-CM

## 2022-05-16 DIAGNOSIS — R73.02 IMPAIRED GLUCOSE TOLERANCE: Primary | ICD-10-CM

## 2022-05-24 ENCOUNTER — OFFICE VISIT (OUTPATIENT)
Dept: INTERNAL MEDICINE | Facility: CLINIC | Age: 67
End: 2022-05-24
Payer: MEDICARE

## 2022-05-24 VITALS
RESPIRATION RATE: 18 BRPM | HEART RATE: 69 BPM | WEIGHT: 220.81 LBS | OXYGEN SATURATION: 98 % | SYSTOLIC BLOOD PRESSURE: 118 MMHG | TEMPERATURE: 99 F | HEIGHT: 73 IN | BODY MASS INDEX: 29.27 KG/M2 | DIASTOLIC BLOOD PRESSURE: 74 MMHG

## 2022-05-24 DIAGNOSIS — R73.02 IMPAIRED GLUCOSE TOLERANCE: ICD-10-CM

## 2022-05-24 DIAGNOSIS — Z85.46 HISTORY OF PROSTATE CANCER: ICD-10-CM

## 2022-05-24 DIAGNOSIS — G47.33 OSA (OBSTRUCTIVE SLEEP APNEA): Primary | ICD-10-CM

## 2022-05-24 PROCEDURE — 99214 OFFICE O/P EST MOD 30 MIN: CPT | Mod: S$PBB,,, | Performed by: INTERNAL MEDICINE

## 2022-05-24 PROCEDURE — 99213 OFFICE O/P EST LOW 20 MIN: CPT | Mod: PBBFAC | Performed by: INTERNAL MEDICINE

## 2022-05-24 PROCEDURE — 99999 PR PBB SHADOW E&M-EST. PATIENT-LVL III: CPT | Mod: PBBFAC,,, | Performed by: INTERNAL MEDICINE

## 2022-05-24 PROCEDURE — 99214 PR OFFICE/OUTPT VISIT, EST, LEVL IV, 30-39 MIN: ICD-10-PCS | Mod: S$PBB,,, | Performed by: INTERNAL MEDICINE

## 2022-05-24 PROCEDURE — 99999 PR PBB SHADOW E&M-EST. PATIENT-LVL III: ICD-10-PCS | Mod: PBBFAC,,, | Performed by: INTERNAL MEDICINE

## 2022-05-24 NOTE — PROGRESS NOTES
[facility] Note    Chief Complaint      Chief Complaint   Patient presents with    Annual Exam       History of Present Illness      Paddy Nair is a 66 y.o. male who presents today for   Chief Complaint   Patient presents with    Annual Exam   .  Patient comes to appointment here for checkup for chronc issues  . I have reviewed all labs with patient today all ok . bs still in  Impaired range . He has received covid vaccine .     HPI    No problem-specific Assessment & Plan notes found for this encounter.       Problem List Items Addressed This Visit        Oncology    History of prostate cancer    Overview     Cont per urology               Endocrine    Impaired glucose tolerance    Overview     Cont current regimen   Low carb diet               Other    GUILLERMO (obstructive sleep apnea) - Primary    Overview     Is utilizing cpap nightly with great benefit                   Past Medical History:  Past Medical History:   Diagnosis Date    Elevated PSA, less than 10 ng/ml 8/21/2018    Urinary tract infection        Past Surgical History:  Past Surgical History:   Procedure Laterality Date    EXCISION OF LESION OF SCROTUM Right 9/23/2019    Procedure: EXCISION, LESION, SCROTUM;  Surgeon: Gagandeep Carrillo MD;  [facility];  Service: Urology;  Laterality: Right;    RADICAL RETROPUBIC PROSTATECTOMY N/A 9/23/2019    Procedure: PROSTATECTOMY, RETROPUBIC, RADICAL,;  Surgeon: Gagandeep Carrillo MD;  [facility];  Service: Urology;  Laterality: N/A;    TONSILLECTOMY         Family History:  family history includes Diabetes in his maternal aunt and maternal grandmother; No Known Problems in his father and mother.     Social History:  Social History     Socioeconomic History    Marital status:    Tobacco Use    Smoking status: Former Smoker     Packs/day: 0.00     Years: 40.00     Pack years: 0.00     Types: Cigarettes     Start date: 1/1/1975     Quit date: 12/4/2017      Years since quittin.4    Smokeless tobacco: Never Used   Substance and Sexual Activity    Alcohol use: Yes     Alcohol/week: 1.0 - 2.0 standard drink     Types: 1 - 2 Cans of beer per week     Comment: Maybe 2 beers a month    Drug use: No    Sexual activity: Yes     Partners: Female     Birth control/protection: None     Social Determinants of Health     Financial Resource Strain: Low Risk     Difficulty of Paying Living Expenses: Not hard at all   Food Insecurity: No Food Insecurity    Worried About Running Out of Food in the Last Year: Never true    Ran Out of Food in the Last Year: Never true   Transportation Needs: No Transportation Needs    Lack of Transportation (Medical): No    Lack of Transportation (Non-Medical): No   Physical Activity: Insufficiently Active    Days of Exercise per Week: 2 days    Minutes of Exercise per Session: 20 min   Stress: No Stress Concern Present    Feeling of Stress : Not at all   Social Connections: Unknown    Frequency of Communication with Friends and Family: Once a week    Frequency of Social Gatherings with Friends and Family: Once a week    Active Member of Clubs or Organizations: No    Attends Club or Organization Meetings: Never    Marital Status:    Housing Stability: Low Risk     Unable to Pay for Housing in the Last Year: No    Number of Places Lived in the Last Year: 1    Unstable Housing in the Last Year: No       Review of Systems:   Review of Systems   Constitutional: Negative for fever and weight loss.   HENT: Negative for congestion, hearing loss and sore throat.    Eyes: Negative for blurred vision.   Respiratory: Negative for cough and shortness of breath.    Cardiovascular: Negative for chest pain, palpitations, claudication and leg swelling.   Gastrointestinal: Negative for abdominal pain, constipation, diarrhea and heartburn.   Genitourinary: Negative for dysuria.   Musculoskeletal: Negative for back pain and myalgias.  "  Skin: Negative for rash.   Neurological: Negative for focal weakness and headaches.   Psychiatric/Behavioral: Negative for depression and suicidal ideas. The patient is not nervous/anxious.         Medications:  Outpatient Encounter Medications as of 5/24/2022   Medication Sig Dispense Refill    multivitamin capsule Take 1 capsule by mouth once daily.      tadalafiL (CIALIS) 20 MG Tab Take 1 tablet (20 mg total) by mouth daily as needed (erectile dysfunction). 30 tablet 11    [DISCONTINUED] amoxicillin (AMOXIL) 500 MG capsule Take 500 mg by mouth 3 (three) times daily.      [DISCONTINUED] HYDROcodone-acetaminophen (NORCO) 7.5-325 mg per tablet Take 1 tablet by mouth.       No facility-administered encounter medications on file as of 5/24/2022.       Allergies:  Review of patient's allergies indicates:   Allergen Reactions    Bactrim [sulfamethoxazole-trimethoprim] Hives    Metronidazole      Abnormal heart rate/tachycardia toward end of treatment with medication possibly anaphylaxis         Physical Exam      Vitals:    05/24/22 1348   BP: 118/74   Pulse: 69   Resp: 18   Temp: 98.6 °F (37 °C)        Vital Signs  Temp: 98.6 °F (37 °C)  Temp src: Oral  Pulse: 69  Resp: 18  SpO2: 98 %  BP: 118/74  BP Location: Right arm  Patient Position: Sitting  Pain Score: 0-No pain  Height and Weight  Height: 6' 1" (185.4 cm)  Weight: 100.1 kg (220 lb 12.7 oz)  BSA (Calculated - sq m): 2.27 sq meters  BMI (Calculated): 29.1  Weight in (lb) to have BMI = 25: 189.1]     Body mass index is 29.13 kg/m².    Physical Exam  Constitutional:       Appearance: He is well-developed.   HENT:      Head: Normocephalic.   Eyes:      Pupils: Pupils are equal, round, and reactive to light.   Neck:      Thyroid: No thyromegaly.   Cardiovascular:      Rate and Rhythm: Normal rate and regular rhythm.      Heart sounds: No murmur heard.    No friction rub. No gallop.   Pulmonary:      Effort: Pulmonary effort is normal.      Breath sounds: " Normal breath sounds.   Abdominal:      General: Bowel sounds are normal.      Palpations: Abdomen is soft.   Musculoskeletal:         General: Normal range of motion.      Cervical back: Normal range of motion.   Skin:     General: Skin is warm and dry.   Neurological:      Mental Status: He is alert and oriented to person, place, and time.      Sensory: No sensory deficit.   Psychiatric:         Behavior: Behavior normal.          Laboratory:  CBC:  No results for input(s): WBC, RBC, HGB, HCT, PLT, MCV, MCH, MCHC in the last 2160 hours.  CMP:  Recent Labs   Lab Result Units 05/20/22  0755   Glucose mg/dL 139*   Calcium mg/dL 8.8   Albumin g/dL 4.3   Total Protein g/dL 7.9   Sodium mmol/L 140   Potassium mmol/L 5.1   CO2 mmol/L 28   Chloride mmol/L 102   BUN mg/dL 17   Alkaline Phosphatase U/L 80   ALT U/L 20   AST U/L 26   Total Bilirubin mg/dL 0.7     URINALYSIS:  No results for input(s): COLORU, CLARITYU, SPECGRAV, PHUR, PROTEINUA, GLUCOSEU, BILIRUBINCON, BLOODU, WBCU, RBCU, BACTERIA, MUCUS, NITRITE, LEUKOCYTESUR, UROBILINOGEN, HYALINECASTS in the last 2160 hours.   LIPIDS:  Recent Labs   Lab Result Units 05/20/22  0755   HDL mg/dL 39*   Cholesterol mg/dL 192   Triglycerides mg/dL 158*   LDL Cholesterol mg/dL 121.4   HDL/Cholesterol Ratio % 20.3   Non-HDL Cholesterol mg/dL 153   Total Cholesterol/HDL Ratio  4.9     TSH:  No results for input(s): TSH in the last 2160 hours.  A1C:  Recent Labs   Lab Result Units 05/20/22  0755   Hemoglobin A1C % 6.0*       Radiology:        Assessment:     Paddy Nair is a 66 y.o.male with:    GUILLERMO (obstructive sleep apnea)    Impaired glucose tolerance    History of prostate cancer                Plan:     Problem List Items Addressed This Visit        Oncology    History of prostate cancer    Overview     Cont per urology               Endocrine    Impaired glucose tolerance    Overview     Cont current regimen   Low carb diet               Other    GUILLERMO (obstructive  sleep apnea) - Primary    Overview     Is utilizing cpap nightly with great benefit                  As above, continue current medications and maintain follow up with specialists.  Return to clinic in 6  months.      Frederick W Dantagnan [facility] [place]

## 2022-09-21 ENCOUNTER — PATIENT MESSAGE (OUTPATIENT)
Dept: UROLOGY | Facility: CLINIC | Age: 67
End: 2022-09-21
Payer: MEDICARE

## 2022-09-29 ENCOUNTER — TELEPHONE (OUTPATIENT)
Dept: SLEEP MEDICINE | Facility: CLINIC | Age: 67
End: 2022-09-29
Payer: MEDICARE

## 2022-09-30 ENCOUNTER — OFFICE VISIT (OUTPATIENT)
Dept: SLEEP MEDICINE | Facility: CLINIC | Age: 67
End: 2022-09-30
Payer: MEDICARE

## 2022-09-30 VITALS
DIASTOLIC BLOOD PRESSURE: 87 MMHG | SYSTOLIC BLOOD PRESSURE: 153 MMHG | WEIGHT: 227.5 LBS | HEIGHT: 73 IN | HEART RATE: 65 BPM | BODY MASS INDEX: 30.15 KG/M2

## 2022-09-30 DIAGNOSIS — G47.33 OSA (OBSTRUCTIVE SLEEP APNEA): Primary | ICD-10-CM

## 2022-09-30 DIAGNOSIS — G47.10 HYPERSOMNOLENCE: ICD-10-CM

## 2022-09-30 PROCEDURE — 99999 PR PBB SHADOW E&M-EST. PATIENT-LVL III: ICD-10-PCS | Mod: PBBFAC,,, | Performed by: INTERNAL MEDICINE

## 2022-09-30 PROCEDURE — 99213 OFFICE O/P EST LOW 20 MIN: CPT | Mod: PBBFAC | Performed by: INTERNAL MEDICINE

## 2022-09-30 PROCEDURE — 99213 PR OFFICE/OUTPT VISIT, EST, LEVL III, 20-29 MIN: ICD-10-PCS | Mod: S$PBB,,, | Performed by: INTERNAL MEDICINE

## 2022-09-30 PROCEDURE — 99213 OFFICE O/P EST LOW 20 MIN: CPT | Mod: S$PBB,,, | Performed by: INTERNAL MEDICINE

## 2022-09-30 PROCEDURE — 99999 PR PBB SHADOW E&M-EST. PATIENT-LVL III: CPT | Mod: PBBFAC,,, | Performed by: INTERNAL MEDICINE

## 2022-09-30 NOTE — PROGRESS NOTES
"ESTABLISHED PATIENT VISIT (LOV 10.8.21 Dr. Ness)    Paddy Nair  is a pleasant 67 y.o. male  with history of BPH, PrCA, low T, GUILLERMO, CPAP (5-12, ahi 7.7, leak 19min).    Here today for: CPAP follow-up    Plan last visit :      Since last visit:   Using nightly      PAP history   Problems    Mask Dreamwear nasal (works well)  Janeen view in the past   Pressure    Benefit    DME HME   Machine age 10/23/2018 (DS2 in 2022)   Download 9.29.22: 30/30 x 8h 9 m, 9-13 (8.2/9.2/9.9), leak 0s, AHI 2.1       SLEEP SCHEDULE   Environment    Bed Time 10:30-11:30P   Sleep Latency 10-15min   Arousals 1   Nocturia 1   Back to sleep    Wake time 7-8A   Naps    Work        Past Medical History:   Diagnosis Date    Elevated PSA, less than 10 ng/ml 8/21/2018    Urinary tract infection      Patient Active Problem List   Diagnosis    Elevated PSA, less than 10 ng/ml    Vasculogenic erectile dysfunction    Low testosterone    GUILLERMO (obstructive sleep apnea)    Benign prostatic hyperplasia with nocturia    Urinary frequency    Nocturia    Fatigue    Abnormal level of female sex hormones    Elevated PSA    Family history of prostate cancer in father    Prostate cancer    Postoperative state    Sebaceous cyst    Encounter for colorectal cancer screening    History of prostate cancer    Impaired glucose tolerance       Current Outpatient Medications:     multivitamin capsule, Take 1 capsule by mouth once daily., Disp: , Rfl:     tadalafiL (CIALIS) 20 MG Tab, Take 1 tablet (20 mg total) by mouth daily as needed (erectile dysfunction)., Disp: 30 tablet, Rfl: 11       Vitals:    09/30/22 1634   BP: (!) 153/87   BP Location: Right arm   Patient Position: Sitting   BP Method: Large (Automatic)   Pulse: 65   Weight: 103.2 kg (227 lb 8.2 oz)   Height: 6' 1" (1.854 m)     Physical Exam:    GEN:   Well-appearing  Psych:  Appropriate affect, demonstrates insight  SKIN:  No rash on the face or bridge of the nose    LABS:  Lab Results   Component " Value Date    CO2 28 05/20/2022         RECORDS REVIEWED PREVIOUSLY:    HST 10/4/2018: AHi 22, RDI 33        PROBLEM DESCRIPTION/ Sx on Presentation Interval Hx STATUS   GUILLERMO   Moderate in GUILLERMO   Wearing nightly  Good efficacy New to me     Daytime Sx   occasional sleepiness when inactive   denies sleepiness when driving   ESS 18/24 on intake Much better with CPAP  Only occasionally sleeping in the afternoon New to me   Nocturia   x 2 per sleep period Once per night New to me   Other issues:     PLAN     -continue auto 9-13  -using and benefitting from PAP therapy    RTC prn

## 2022-10-14 ENCOUNTER — OFFICE VISIT (OUTPATIENT)
Dept: UROLOGY | Facility: CLINIC | Age: 67
End: 2022-10-14
Payer: MEDICARE

## 2022-10-14 VITALS
WEIGHT: 227.63 LBS | SYSTOLIC BLOOD PRESSURE: 130 MMHG | HEART RATE: 61 BPM | HEIGHT: 73 IN | DIASTOLIC BLOOD PRESSURE: 81 MMHG | BODY MASS INDEX: 30.17 KG/M2

## 2022-10-14 DIAGNOSIS — Z80.42 FAMILY HISTORY OF PROSTATE CANCER IN FATHER: ICD-10-CM

## 2022-10-14 DIAGNOSIS — R35.1 BENIGN PROSTATIC HYPERPLASIA WITH NOCTURIA: ICD-10-CM

## 2022-10-14 DIAGNOSIS — N40.1 BENIGN PROSTATIC HYPERPLASIA WITH NOCTURIA: ICD-10-CM

## 2022-10-14 DIAGNOSIS — R53.83 FATIGUE, UNSPECIFIED TYPE: ICD-10-CM

## 2022-10-14 DIAGNOSIS — Z85.46 HISTORY OF PROSTATE CANCER: ICD-10-CM

## 2022-10-14 DIAGNOSIS — R35.1 NOCTURIA: ICD-10-CM

## 2022-10-14 DIAGNOSIS — R35.0 URINARY FREQUENCY: ICD-10-CM

## 2022-10-14 PROCEDURE — 99214 PR OFFICE/OUTPT VISIT, EST, LEVL IV, 30-39 MIN: ICD-10-PCS | Mod: S$PBB,,, | Performed by: UROLOGY

## 2022-10-14 PROCEDURE — 99214 OFFICE O/P EST MOD 30 MIN: CPT | Mod: S$PBB,,, | Performed by: UROLOGY

## 2022-10-14 PROCEDURE — 99999 PR PBB SHADOW E&M-EST. PATIENT-LVL III: ICD-10-PCS | Mod: PBBFAC,,, | Performed by: UROLOGY

## 2022-10-14 PROCEDURE — 99213 OFFICE O/P EST LOW 20 MIN: CPT | Mod: PBBFAC,PO | Performed by: UROLOGY

## 2022-10-14 PROCEDURE — 99999 PR PBB SHADOW E&M-EST. PATIENT-LVL III: CPT | Mod: PBBFAC,,, | Performed by: UROLOGY

## 2022-10-14 RX ORDER — TADALAFIL 20 MG/1
20 TABLET ORAL DAILY PRN
Qty: 30 TABLET | Refills: 11 | Status: SHIPPED | OUTPATIENT
Start: 2022-10-14 | End: 2023-04-12

## 2022-10-14 NOTE — PROGRESS NOTES
Subjective:       Patient ID: Paddy Nair is a 67 y.o. male.    Chief Complaint: Follow-up    66 yo WM is 3 years status post radical retropubic prostatectomy.  The patient's most recent PSA was 0.08.  He has been undergoing quarterly PSA testing.  The patient's PSA is at a has remained stable and in fact is approximately the same as it was 2 years ago.  The patient has not had any other treatments other than the surgery.  Patient has good urinary control and has erectile dysfunction with assistance of Cialis which is not quite 100% but functional.       Follow-up  This is a chronic problem. The current episode started more than 1 year ago. The problem occurs constantly. The problem has been waxing and waning. Associated symptoms include urinary symptoms (nocturia x 1). Pertinent negatives include no abdominal pain, anorexia, arthralgias, change in bowel habit, chest pain, chills, congestion, coughing, diaphoresis, fatigue, fever, headaches, joint swelling, myalgias, nausea, neck pain, numbness, rash, sore throat, swollen glands, vertigo, visual change, vomiting or weakness. Nothing aggravates the symptoms. He has tried drinking for the symptoms. The treatment provided significant relief.   Review of Systems   Constitutional:  Negative for activity change, appetite change, chills, diaphoresis, fatigue, fever and unexpected weight change.   HENT:  Negative for congestion, hearing loss, sinus pressure, sore throat and trouble swallowing.    Eyes:  Negative for photophobia, pain, discharge and visual disturbance.   Respiratory:  Negative for apnea, cough and shortness of breath.    Cardiovascular:  Negative for chest pain, palpitations and leg swelling.   Gastrointestinal:  Negative for abdominal distention, abdominal pain, anal bleeding, anorexia, blood in stool, change in bowel habit, constipation, diarrhea, nausea, rectal pain and vomiting.   Endocrine: Negative for cold intolerance, heat intolerance,  polydipsia, polyphagia and polyuria.   Genitourinary:  Negative for decreased urine volume, difficulty urinating, dysuria, enuresis, flank pain, frequency, genital sores, hematuria, penile discharge, penile pain, penile swelling, scrotal swelling, testicular pain and urgency.   Musculoskeletal:  Negative for arthralgias, back pain, joint swelling, myalgias and neck pain.   Skin:  Negative for color change, pallor, rash and wound.   Allergic/Immunologic: Negative for environmental allergies, food allergies and immunocompromised state.   Neurological:  Negative for dizziness, vertigo, seizures, weakness, numbness and headaches.   Hematological:  Negative for adenopathy. Does not bruise/bleed easily.   Psychiatric/Behavioral: Negative.       Objective:      Physical Exam  Vitals and nursing note reviewed.   Constitutional:       General: He is not in acute distress.     Appearance: Normal appearance. He is well-developed. He is not ill-appearing, toxic-appearing or diaphoretic.   HENT:      Head: Normocephalic and atraumatic.      Right Ear: External ear normal.      Left Ear: External ear normal.      Nose: Nose normal.      Mouth/Throat:      Mouth: Mucous membranes are moist.      Pharynx: Oropharynx is clear. No oropharyngeal exudate or posterior oropharyngeal erythema.   Eyes:      Conjunctiva/sclera: Conjunctivae normal.      Pupils: Pupils are equal, round, and reactive to light.   Cardiovascular:      Rate and Rhythm: Normal rate and regular rhythm.      Heart sounds: Normal heart sounds.   Pulmonary:      Effort: Pulmonary effort is normal.      Breath sounds: Normal breath sounds.   Abdominal:      General: Bowel sounds are normal.      Palpations: Abdomen is soft.      Tenderness: There is no right CVA tenderness or left CVA tenderness.   Genitourinary:     Penis: Normal.       Testes: Normal.   Musculoskeletal:         General: Normal range of motion.      Cervical back: Normal range of motion and neck  supple.   Skin:     General: Skin is warm and dry.      Capillary Refill: Capillary refill takes less than 2 seconds.   Neurological:      Mental Status: He is alert and oriented to person, place, and time.      Deep Tendon Reflexes: Reflexes are normal and symmetric.   Psychiatric:         Behavior: Behavior normal.         Thought Content: Thought content normal.         Judgment: Judgment normal.       Assessment:       1. Benign prostatic hyperplasia with nocturia    2. Nocturia    3. Urinary frequency    4. History of prostate cancer    5. Family history of prostate cancer in father    6. Fatigue, unspecified type          Plan:       Patient Instructions   Will follow-up in 6 months with PSA.  Continue follow-up every 6 months and monitor change in PSA.  Report any changes in general health or urination or any other issues concerning to me via my chart and I will evaluate Castellanos as necessary.

## 2022-10-14 NOTE — PATIENT INSTRUCTIONS
Will follow-up in 6 months with PSA.  Continue follow-up every 6 months and monitor change in PSA.  Report any changes in general health or urination or any other issues concerning to me via my chart and I will evaluate Castellanos as necessary.

## 2022-11-28 ENCOUNTER — OFFICE VISIT (OUTPATIENT)
Dept: INTERNAL MEDICINE | Facility: CLINIC | Age: 67
End: 2022-11-28
Payer: MEDICARE

## 2022-11-28 VITALS
HEART RATE: 63 BPM | WEIGHT: 227.19 LBS | BODY MASS INDEX: 30.11 KG/M2 | TEMPERATURE: 98 F | OXYGEN SATURATION: 96 % | RESPIRATION RATE: 18 BRPM | SYSTOLIC BLOOD PRESSURE: 118 MMHG | HEIGHT: 73 IN | DIASTOLIC BLOOD PRESSURE: 74 MMHG

## 2022-11-28 DIAGNOSIS — G47.33 OSA (OBSTRUCTIVE SLEEP APNEA): Primary | ICD-10-CM

## 2022-11-28 DIAGNOSIS — N40.1 BENIGN PROSTATIC HYPERPLASIA WITH NOCTURIA: ICD-10-CM

## 2022-11-28 DIAGNOSIS — R35.1 BENIGN PROSTATIC HYPERPLASIA WITH NOCTURIA: ICD-10-CM

## 2022-11-28 DIAGNOSIS — Z85.46 HISTORY OF PROSTATE CANCER: ICD-10-CM

## 2022-11-28 DIAGNOSIS — Z23 NEED FOR INFLUENZA VACCINATION: ICD-10-CM

## 2022-11-28 PROCEDURE — 99999 PR PBB SHADOW E&M-EST. PATIENT-LVL III: ICD-10-PCS | Mod: PBBFAC,,, | Performed by: INTERNAL MEDICINE

## 2022-11-28 PROCEDURE — 99214 OFFICE O/P EST MOD 30 MIN: CPT | Mod: S$PBB,,, | Performed by: INTERNAL MEDICINE

## 2022-11-28 PROCEDURE — 99214 PR OFFICE/OUTPT VISIT, EST, LEVL IV, 30-39 MIN: ICD-10-PCS | Mod: S$PBB,,, | Performed by: INTERNAL MEDICINE

## 2022-11-28 PROCEDURE — G0008 ADMIN INFLUENZA VIRUS VAC: HCPCS | Mod: PBBFAC

## 2022-11-28 PROCEDURE — 99213 OFFICE O/P EST LOW 20 MIN: CPT | Mod: PBBFAC | Performed by: INTERNAL MEDICINE

## 2022-11-28 PROCEDURE — 99999 PR PBB SHADOW E&M-EST. PATIENT-LVL III: CPT | Mod: PBBFAC,,, | Performed by: INTERNAL MEDICINE

## 2022-11-28 NOTE — PROGRESS NOTES
[facility] Note    Chief Complaint      Chief Complaint   Patient presents with    Follow-up     6M       History of Present Illness      Paddy Niar is a 67 y.o. male who presents today for   Chief Complaint   Patient presents with    Follow-up     6M   .  Patient comes to appointment here for 6m checkup for chronic issues as below . He is utilizing cpap all night every night with great benefit .he needs labs with next visit . He is doing well with low carb diet . He is getting some walking in with golf but admits he could be doing more . He is complaining of coldness in his feet . He requests flu vaccine     HPI    No problem-specific Assessment & Plan notes found for this encounter.       Problem List Items Addressed This Visit          Renal/    Benign prostatic hyperplasia with nocturia    Overview     Dr shelley managing             Oncology    History of prostate cancer    Overview     Cont per urology             Other    GUILLERMO (obstructive sleep apnea) - Primary    Overview     Is utilizing cpap nightly with great benefit           Other Visit Diagnoses       Need for influenza vaccination                 Past Medical History:  Past Medical History:   Diagnosis Date    Elevated PSA, less than 10 ng/ml 8/21/2018    Urinary tract infection        Past Surgical History:  Past Surgical History:   Procedure Laterality Date    EXCISION OF LESION OF SCROTUM Right 09/23/2019    Procedure: EXCISION, LESION, SCROTUM;  Surgeon: Gagandeep Shelley MD;  Location: [facility] OR;  Service: Urology;  Laterality: Right;    PROSTATE SURGERY  9/21/19    RADICAL RETROPUBIC PROSTATECTOMY N/A 09/23/2019    Procedure: PROSTATECTOMY, RETROPUBIC, RADICAL,;  Surgeon: Gagandeep Shelley MD;  Location: [facility] OR;  Service: Urology;  Laterality: N/A;    TONSILLECTOMY         Family History:  family history includes Cancer in his brother and father; Diabetes in his maternal aunt and maternal grandmother; No  Known Problems in his mother.     Social History:  Social History     Socioeconomic History    Marital status:    Tobacco Use    Smoking status: Former     Packs/day: 0.00     Years: 40.00     Pack years: 0.00     Types: Cigarettes     Start date: 1975     Quit date: 2017     Years since quittin.9    Smokeless tobacco: Never   Substance and Sexual Activity    Alcohol use: Yes     Alcohol/week: 1.0 - 2.0 standard drink     Types: 1 - 2 Cans of beer per week     Comment: Maybe 2 beers a month    Drug use: No    Sexual activity: Yes     Partners: Female     Birth control/protection: None     Social Determinants of Health     Financial Resource Strain: Low Risk     Difficulty of Paying Living Expenses: Not hard at all   Food Insecurity: No Food Insecurity    Worried About Running Out of Food in the Last Year: Never true    Ran Out of Food in the Last Year: Never true   Transportation Needs: Unmet Transportation Needs    Lack of Transportation (Medical): Yes    Lack of Transportation (Non-Medical): Yes   Physical Activity: Insufficiently Active    Days of Exercise per Week: 2 days    Minutes of Exercise per Session: 20 min   Stress: No Stress Concern Present    Feeling of Stress : Not at all   Social Connections: Unknown    Frequency of Communication with Friends and Family: Twice a week    Frequency of Social Gatherings with Friends and Family: Once a week    Active Member of Clubs or Organizations: No    Attends Club or Organization Meetings: Never    Marital Status:    Housing Stability: Low Risk     Unable to Pay for Housing in the Last Year: No    Number of Places Lived in the Last Year: 1    Unstable Housing in the Last Year: No       Review of Systems:   Review of Systems   Constitutional:  Negative for fever and weight loss.   HENT:  Negative for congestion, hearing loss and sore throat.    Eyes:  Negative for blurred vision.   Respiratory:  Negative for cough and shortness of breath.   "  Cardiovascular:  Negative for chest pain, palpitations, claudication and leg swelling.   Gastrointestinal:  Negative for abdominal pain, constipation, diarrhea and heartburn.   Genitourinary:  Negative for dysuria.   Musculoskeletal:  Negative for back pain and myalgias.   Skin:  Negative for rash.   Neurological:  Positive for tingling. Negative for focal weakness and headaches.   Psychiatric/Behavioral:  Negative for depression and suicidal ideas. The patient is not nervous/anxious.       Medications:  Outpatient Encounter Medications as of 11/28/2022   Medication Sig Dispense Refill    multivitamin capsule Take 1 capsule by mouth once daily.      tadalafiL (CIALIS) 20 MG Tab Take 1 tablet (20 mg total) by mouth daily as needed (erectile dysfunction). 30 tablet 11     No facility-administered encounter medications on file as of 11/28/2022.       Allergies:  Review of patient's allergies indicates:   Allergen Reactions    Bactrim [sulfamethoxazole-trimethoprim] Hives    Metronidazole      Abnormal heart rate/tachycardia toward end of treatment with medication possibly anaphylaxis         Physical Exam      Vitals:    11/28/22 1420   BP: 118/74   Pulse: 63   Resp: 18   Temp: 97.8 °F (36.6 °C)        Vital Signs  Temp: 97.8 °F (36.6 °C)  Temp src: Oral  Pulse: 63  Resp: 18  SpO2: 96 %  BP: 118/74  BP Location: Left arm  Patient Position: Sitting  Pain Score: 0-No pain  Height and Weight  Height: 6' 1" (185.4 cm)  Weight: 103 kg (227 lb 2.9 oz)  BSA (Calculated - sq m): 2.3 sq meters  BMI (Calculated): 30  Weight in (lb) to have BMI = 25: 189.1]     Body mass index is 29.97 kg/m².    Physical Exam  Constitutional:       Appearance: He is well-developed.   HENT:      Head: Normocephalic.   Eyes:      Pupils: Pupils are equal, round, and reactive to light.   Neck:      Thyroid: No thyromegaly.   Cardiovascular:      Rate and Rhythm: Normal rate and regular rhythm.      Heart sounds: No murmur heard.    No friction " rub. No gallop.   Pulmonary:      Effort: Pulmonary effort is normal.      Breath sounds: Normal breath sounds.   Abdominal:      General: Bowel sounds are normal.      Palpations: Abdomen is soft.   Musculoskeletal:         General: Normal range of motion.      Cervical back: Normal range of motion.   Skin:     General: Skin is warm and dry.   Neurological:      Mental Status: He is alert and oriented to person, place, and time.      Sensory: No sensory deficit.   Psychiatric:         Behavior: Behavior normal.        Laboratory:  CBC:  No results for input(s): WBC, RBC, HGB, HCT, PLT, MCV, MCH, MCHC in the last 2160 hours.  CMP:  No results for input(s): GLU, CALCIUM, ALBUMIN, PROT, NA, K, CO2, CL, BUN, ALKPHOS, ALT, AST, BILITOT in the last 2160 hours.    Invalid input(s): CREATININ  URINALYSIS:  No results for input(s): COLORU, CLARITYU, SPECGRAV, PHUR, PROTEINUA, GLUCOSEU, BILIRUBINCON, BLOODU, WBCU, RBCU, BACTERIA, MUCUS, NITRITE, LEUKOCYTESUR, UROBILINOGEN, HYALINECASTS in the last 2160 hours.   LIPIDS:  No results for input(s): TSH, HDL, CHOL, TRIG, LDLCALC, CHOLHDL, NONHDLCHOL, TOTALCHOLEST in the last 2160 hours.  TSH:  No results for input(s): TSH in the last 2160 hours.  A1C:  No results for input(s): HGBA1C in the last 2160 hours.    Radiology:        Assessment:     Paddy Nair is a 67 y.o.male with:    GUILLERMO (obstructive sleep apnea)    Need for influenza vaccination  -     Influenza (FLUAD) - Quadrivalent (Adjuvanted) *Preferred* (65+) (PF)    History of prostate cancer    Benign prostatic hyperplasia with nocturia              Plan:     Problem List Items Addressed This Visit          Renal/    Benign prostatic hyperplasia with nocturia    Overview     Dr shelley managing             Oncology    History of prostate cancer    Overview     Cont per urology             Other    GUILLERMO (obstructive sleep apnea) - Primary    Overview     Is utilizing cpap nightly with great benefit           Other  Visit Diagnoses       Need for influenza vaccination                As above, continue current medications and maintain follow up with specialists.  Return to clinic in 6 months.      Frederick W Dantagnan [facility]

## 2023-03-03 ENCOUNTER — TELEPHONE (OUTPATIENT)
Dept: PRIMARY CARE CLINIC | Facility: CLINIC | Age: 68
End: 2023-03-03
Payer: MEDICARE

## 2023-03-03 DIAGNOSIS — R73.02 IMPAIRED GLUCOSE TOLERANCE: Primary | ICD-10-CM

## 2023-03-03 DIAGNOSIS — E78.6 LOW HDL (UNDER 40): ICD-10-CM

## 2023-03-09 ENCOUNTER — TELEPHONE (OUTPATIENT)
Dept: FAMILY MEDICINE | Facility: CLINIC | Age: 68
End: 2023-03-09
Payer: MEDICARE

## 2023-04-12 ENCOUNTER — OFFICE VISIT (OUTPATIENT)
Dept: UROLOGY | Facility: CLINIC | Age: 68
End: 2023-04-12
Payer: MEDICARE

## 2023-04-12 VITALS
HEART RATE: 62 BPM | SYSTOLIC BLOOD PRESSURE: 119 MMHG | WEIGHT: 224.63 LBS | DIASTOLIC BLOOD PRESSURE: 71 MMHG | BODY MASS INDEX: 29.77 KG/M2 | HEIGHT: 73 IN

## 2023-04-12 DIAGNOSIS — Z85.46 HISTORY OF PROSTATE CANCER: Primary | ICD-10-CM

## 2023-04-12 DIAGNOSIS — Z80.42 FAMILY HISTORY OF PROSTATE CANCER IN FATHER: ICD-10-CM

## 2023-04-12 PROCEDURE — 99213 OFFICE O/P EST LOW 20 MIN: CPT | Mod: PBBFAC,PO | Performed by: UROLOGY

## 2023-04-12 PROCEDURE — 99214 PR OFFICE/OUTPT VISIT, EST, LEVL IV, 30-39 MIN: ICD-10-PCS | Mod: S$PBB,,, | Performed by: UROLOGY

## 2023-04-12 PROCEDURE — 99214 OFFICE O/P EST MOD 30 MIN: CPT | Mod: S$PBB,,, | Performed by: UROLOGY

## 2023-04-12 PROCEDURE — 99999 PR PBB SHADOW E&M-EST. PATIENT-LVL III: ICD-10-PCS | Mod: PBBFAC,,, | Performed by: UROLOGY

## 2023-04-12 PROCEDURE — 99999 PR PBB SHADOW E&M-EST. PATIENT-LVL III: CPT | Mod: PBBFAC,,, | Performed by: UROLOGY

## 2023-04-12 RX ORDER — TADALAFIL 20 MG/1
20 TABLET ORAL DAILY PRN
Qty: 30 TABLET | Refills: 11 | Status: SHIPPED | OUTPATIENT
Start: 2023-04-12 | End: 2023-10-18 | Stop reason: SDUPTHER

## 2023-04-12 NOTE — PROGRESS NOTES
Subjective:      Patient ID: Paddy Nair is a 67 y.o. male.    Chief Complaint: Benign Prostatic Hypertrophy (6 month f/u)    68 yo WM is 3 and 1/2 years status post radical retropubic prostatectomy.  The patient's most recent PSA was 0.07 which is stable and down from 0.8.  He has been undergoing f/u with PSA testing.  The patient's PSA  has remained stable and in fact is approximately the same as it was 2 and 1/2 years ago.  The patient has not had any other treatments other than the surgery.  Patient has good urinary control and has erectile dysfunction with assistance of Cialis which is not quite 100% but functional.    Benign Prostatic Hypertrophy  This is a chronic problem. The current episode started more than 1 year ago. The problem has been resolved since onset. Irritative symptoms include nocturia (x 0-1). Irritative symptoms do not include frequency or urgency. rare FE, no pads. Obstructive symptoms do not include dribbling, incomplete emptying, an intermittent stream, a slower stream, straining or a weak stream. Pertinent negatives include no chills, dysuria, genital pain, hematuria, hesitancy, nausea or vomiting. AUA score is 0-7. He is sexually active. Nothing aggravates the symptoms. Treatments tried: The pubic prostatectomy. The treatment provided significant relief. He has been using treatment for 2 or more years.   Review of Systems   Constitutional:  Negative for activity change, appetite change, chills, diaphoresis, fatigue, fever and unexpected weight change.   HENT:  Negative for congestion, hearing loss, sinus pressure and trouble swallowing.    Eyes:  Negative for photophobia, pain, discharge and visual disturbance.   Respiratory:  Negative for apnea, cough and shortness of breath.    Cardiovascular:  Negative for chest pain, palpitations and leg swelling.   Gastrointestinal:  Negative for abdominal distention, abdominal pain, anal bleeding, blood in stool, constipation, diarrhea,  nausea, rectal pain and vomiting.   Endocrine: Negative for cold intolerance, heat intolerance, polydipsia, polyphagia and polyuria.   Genitourinary:  Positive for nocturia (x 0-1). Negative for decreased urine volume, difficulty urinating, dysuria, enuresis, flank pain, frequency, genital sores, hematuria, hesitancy, incomplete emptying, penile discharge, penile pain, penile swelling, scrotal swelling, testicular pain and urgency.   Musculoskeletal:  Negative for arthralgias, back pain and myalgias.   Skin:  Negative for color change, pallor, rash and wound.   Allergic/Immunologic: Negative for environmental allergies, food allergies and immunocompromised state.   Neurological:  Negative for dizziness, seizures, weakness and headaches.   Hematological:  Negative for adenopathy. Does not bruise/bleed easily.   Psychiatric/Behavioral: Negative.      Objective:     Physical Exam  Vitals and nursing note reviewed.   Constitutional:       General: He is not in acute distress.     Appearance: Normal appearance. He is well-developed. He is not ill-appearing, toxic-appearing or diaphoretic.   HENT:      Head: Normocephalic and atraumatic.      Right Ear: External ear normal.      Left Ear: External ear normal.      Nose: Nose normal.      Mouth/Throat:      Pharynx: No oropharyngeal exudate or posterior oropharyngeal erythema.   Eyes:      General: No scleral icterus.        Right eye: No discharge.         Left eye: No discharge.      Conjunctiva/sclera: Conjunctivae normal.      Pupils: Pupils are equal, round, and reactive to light.   Cardiovascular:      Rate and Rhythm: Normal rate and regular rhythm.      Pulses: Normal pulses.      Heart sounds: Normal heart sounds.   Pulmonary:      Effort: Pulmonary effort is normal.      Breath sounds: Normal breath sounds. No rales.   Chest:      Chest wall: No tenderness.   Abdominal:      General: Bowel sounds are normal.      Palpations: Abdomen is soft.   Genitourinary:      Penis: Normal.       Testes: Normal.   Musculoskeletal:         General: Normal range of motion.      Cervical back: Normal range of motion and neck supple.   Skin:     General: Skin is warm and dry.      Capillary Refill: Capillary refill takes less than 2 seconds.      Findings: No lesion or rash.   Neurological:      Mental Status: He is alert and oriented to person, place, and time.      Gait: Gait normal.      Deep Tendon Reflexes: Reflexes are normal and symmetric. Reflexes normal.   Psychiatric:         Mood and Affect: Mood normal.         Behavior: Behavior normal.         Thought Content: Thought content normal.         Judgment: Judgment normal.      Assessment:      1. History of prostate cancer    2. Family history of prostate cancer in father      Plan:     Patient Instructions   F/U 6 months with PSA.  Continue Cialis PRN

## 2023-04-26 ENCOUNTER — PATIENT MESSAGE (OUTPATIENT)
Dept: PRIMARY CARE CLINIC | Facility: CLINIC | Age: 68
End: 2023-04-26
Payer: MEDICARE

## 2023-05-04 ENCOUNTER — OFFICE VISIT (OUTPATIENT)
Dept: SLEEP MEDICINE | Facility: CLINIC | Age: 68
End: 2023-05-04
Payer: MEDICARE

## 2023-05-04 VITALS
HEART RATE: 76 BPM | WEIGHT: 223.56 LBS | SYSTOLIC BLOOD PRESSURE: 130 MMHG | HEIGHT: 73 IN | BODY MASS INDEX: 29.63 KG/M2 | DIASTOLIC BLOOD PRESSURE: 80 MMHG

## 2023-05-04 DIAGNOSIS — G47.33 OSA (OBSTRUCTIVE SLEEP APNEA): Primary | ICD-10-CM

## 2023-05-04 PROCEDURE — 99213 OFFICE O/P EST LOW 20 MIN: CPT | Mod: PBBFAC | Performed by: PHYSICIAN ASSISTANT

## 2023-05-04 PROCEDURE — 99213 PR OFFICE/OUTPT VISIT, EST, LEVL III, 20-29 MIN: ICD-10-PCS | Mod: S$PBB,,, | Performed by: PHYSICIAN ASSISTANT

## 2023-05-04 PROCEDURE — 99999 PR PBB SHADOW E&M-EST. PATIENT-LVL III: CPT | Mod: PBBFAC,,, | Performed by: PHYSICIAN ASSISTANT

## 2023-05-04 PROCEDURE — 99213 OFFICE O/P EST LOW 20 MIN: CPT | Mod: S$PBB,,, | Performed by: PHYSICIAN ASSISTANT

## 2023-05-04 PROCEDURE — 99999 PR PBB SHADOW E&M-EST. PATIENT-LVL III: ICD-10-PCS | Mod: PBBFAC,,, | Performed by: PHYSICIAN ASSISTANT

## 2023-05-04 NOTE — PROGRESS NOTES
"Referred by No ref. provider found     ESTABLISHED PATIENT VISIT  New to me. Previously evaluated by Dr. Tovar.      Paddy Nair  is a pleasant 67 y.o. male  with PMH significant for BPH, PrCA, low T, former smoker, GUILLERMO dx 2018 on CPAP.    Here today for: CPAP management/supplies    PLAN last visit 9/20/22:   -continue auto CPAP 7-13cwp      Since last visit:   Reports using CPAP nightly with good results. States he is here today for CPAP supplies. Denies any problems or concerns at this time.      PAP history   Problems none   Mask DreamWear Nasal (shweta in past)   Pressure 7-13cwp   DME HME   Machine age DS 2 10/8/21 (got OG DS 10/22/18)   Download 5.3.23: 30/30 x 8hrs 15mins, 7-13cwp (8.6/10.7), leak (0/8/9.6), AHI 2.7        Past Medical History:   Diagnosis Date    Elevated PSA, less than 10 ng/ml 8/21/2018    Urinary tract infection      Patient Active Problem List   Diagnosis    Elevated PSA, less than 10 ng/ml    Vasculogenic erectile dysfunction    Low testosterone    GUILLERMO (obstructive sleep apnea)    Benign prostatic hyperplasia with nocturia    Urinary frequency    Nocturia    Fatigue    Abnormal level of female sex hormones    Elevated PSA    Family history of prostate cancer in father    Prostate cancer    Postoperative state    Sebaceous cyst    Encounter for colorectal cancer screening    History of prostate cancer    Impaired glucose tolerance       Current Outpatient Medications:     multivitamin capsule, Take 1 capsule by mouth once daily., Disp: , Rfl:     tadalafiL (CIALIS) 20 MG Tab, Take 1 tablet (20 mg total) by mouth daily as needed (erectile dysfunction)., Disp: 30 tablet, Rfl: 11       Vitals:    05/04/23 0951   BP: 130/80   BP Location: Right arm   Patient Position: Sitting   BP Method: Medium (Automatic)   Pulse: 76   Weight: 101.4 kg (223 lb 8.7 oz)   Height: 6' 1" (1.854 m)     Physical Exam:    GEN:   Well-appearing  Psych:  Appropriate affect, demonstrates insight  SKIN:  No " rash on the face or bridge of the nose      LABS:   No results found for: HGB, CO2    RECORDS REVIEWED PREVIOUSLY:    HST 10/4/2018: AHi 22, RDI 33    ASSESSMENT    [place] Sleepiness Scale:  Sitting and readin  Watching TV:    1  Passenger in a car x 1 hr:  1  Sitting quietly after lunch:  1  Lying down to rest in PM:  1  Sitting, inactive in public:  1  Sitting+ talking to someone:  1  Stopped in traffic:   0  Total      Denies ever falling asleep behind the wheel.    PROBLEM DESCRIPTION/ Sx on Presentation Interval Hx STATUS   GUILLERMO   + snoring, +arousals  [place] GUILLERMO AHI 22 Wearing nightly with good efficacy  controlled   Daytime Sx   + sleepiness when inactive, occasionally   Denies sleepiness while driving  ESS  on intake Much improved on CPAP   improved   Nocturia   x 2 per sleep period  New       PLAN     -using and benefiting from CPAP therapy  -continue CPAP 7-13cwp  -CPAP supplies ordered  -discussed GUILLERMO and CPAP with patient in detail, including possible complications of untreated GUILLERMO like heart attack/stroke  -advised on strict driving precautions; advised never to drive drowsy    Advised on plan of care. Answered all patient questions. Patient verbalized understanding and voiced agreement with plan of care.      RTC          The patient was given open opportunity to ask questions and/or express concerns about treatment plan.   All questions/concerns were discussed.     Two patient identifiers used prior to evaluation.

## 2023-05-09 ENCOUNTER — PATIENT MESSAGE (OUTPATIENT)
Dept: SLEEP MEDICINE | Facility: CLINIC | Age: 68
End: 2023-05-09
Payer: MEDICARE

## 2023-05-17 ENCOUNTER — PATIENT MESSAGE (OUTPATIENT)
Dept: SLEEP MEDICINE | Facility: CLINIC | Age: 68
End: 2023-05-17
Payer: MEDICARE

## 2023-06-01 ENCOUNTER — OFFICE VISIT (OUTPATIENT)
Dept: PRIMARY CARE CLINIC | Facility: CLINIC | Age: 68
End: 2023-06-01
Payer: MEDICARE

## 2023-06-01 VITALS
WEIGHT: 223.75 LBS | HEART RATE: 65 BPM | DIASTOLIC BLOOD PRESSURE: 80 MMHG | RESPIRATION RATE: 18 BRPM | OXYGEN SATURATION: 97 % | BODY MASS INDEX: 29.65 KG/M2 | HEIGHT: 73 IN | SYSTOLIC BLOOD PRESSURE: 118 MMHG | TEMPERATURE: 98 F

## 2023-06-01 DIAGNOSIS — G47.33 OSA (OBSTRUCTIVE SLEEP APNEA): Primary | ICD-10-CM

## 2023-06-01 DIAGNOSIS — Z23 NEED FOR PNEUMOCOCCAL VACCINATION: ICD-10-CM

## 2023-06-01 DIAGNOSIS — Z85.46 HISTORY OF PROSTATE CANCER: ICD-10-CM

## 2023-06-01 DIAGNOSIS — R73.02 IMPAIRED GLUCOSE TOLERANCE: ICD-10-CM

## 2023-06-01 DIAGNOSIS — N52.03 COMBINED ARTERIAL INSUFFICIENCY AND CORPORO-VENOUS OCCLUSIVE ERECTILE DYSFUNCTION: ICD-10-CM

## 2023-06-01 PROCEDURE — 99214 PR OFFICE/OUTPT VISIT, EST, LEVL IV, 30-39 MIN: ICD-10-PCS | Mod: S$PBB,,, | Performed by: INTERNAL MEDICINE

## 2023-06-01 PROCEDURE — 99214 OFFICE O/P EST MOD 30 MIN: CPT | Mod: PBBFAC,25 | Performed by: INTERNAL MEDICINE

## 2023-06-01 PROCEDURE — 99214 OFFICE O/P EST MOD 30 MIN: CPT | Mod: S$PBB,,, | Performed by: INTERNAL MEDICINE

## 2023-06-01 PROCEDURE — 90670 PCV13 VACCINE IM: CPT | Mod: PBBFAC

## 2023-06-01 PROCEDURE — 99999 PR PBB SHADOW E&M-EST. PATIENT-LVL IV: ICD-10-PCS | Mod: PBBFAC,,, | Performed by: INTERNAL MEDICINE

## 2023-06-01 PROCEDURE — G0009 ADMIN PNEUMOCOCCAL VACCINE: HCPCS | Mod: PBBFAC

## 2023-06-01 PROCEDURE — 99999 PR PBB SHADOW E&M-EST. PATIENT-LVL IV: CPT | Mod: PBBFAC,,, | Performed by: INTERNAL MEDICINE

## 2023-06-01 NOTE — PROGRESS NOTES
[facility] Note    Chief Complaint      Chief Complaint   Patient presents with    Follow-up       History of Present Illness      Paddy Nair is a 67 y.o. male who presents today for   Chief Complaint   Patient presents with    Follow-up   .  Patient comes to appointment here fro 6m checkup for chronic issues as below . He is doing well with diet . Is uptodate with f/u with dr shelley . He admits coule be better with deit and exercise. Needs pneumonia vacinbe      HPI    No problem-specific Assessment & Plan notes found for this encounter.       Problem List Items Addressed This Visit          Renal/    Vasculogenic erectile dysfunction    Overview     Cont cialis             Oncology    History of prostate cancer    Overview     Cont per urology             Endocrine    Impaired glucose tolerance    Overview     Cont current regimen   Low carb diet   a1c 6.0             Other    GUILLERMO (obstructive sleep apnea) - Primary    Overview     Is utilizing cpap nightly with great benefit . Using all night everynight          Other Visit Diagnoses       Need for pneumococcal vaccination                 Past Medical History:  Past Medical History:   Diagnosis Date    Elevated PSA, less than 10 ng/ml 8/21/2018    Urinary tract infection        Past Surgical History:  Past Surgical History:   Procedure Laterality Date    EXCISION OF LESION OF SCROTUM Right 09/23/2019    Procedure: EXCISION, LESION, SCROTUM;  Surgeon: Gagandeep Shelley MD;  Location: [facility] OR;  Service: Urology;  Laterality: Right;    PROSTATE SURGERY  9/21/19    RADICAL RETROPUBIC PROSTATECTOMY N/A 09/23/2019    Procedure: PROSTATECTOMY, RETROPUBIC, RADICAL,;  Surgeon: Gagandeep Shelley MD;  Location: [facility] OR;  Service: Urology;  Laterality: N/A;    TONSILLECTOMY         Family History:  family history includes Cancer in his brother and father; Diabetes in his maternal aunt and maternal grandmother; No Known Problems in his  mother.     Social History:  Social History     Socioeconomic History    Marital status:    Tobacco Use    Smoking status: Former     Packs/day: 0.00     Years: 40.00     Pack years: 0.00     Types: Cigarettes     Start date: 1975     Quit date: 2017     Years since quittin.4    Smokeless tobacco: Never   Substance and Sexual Activity    Alcohol use: Yes     Alcohol/week: 1.0 - 2.0 standard drink     Types: 1 - 2 Cans of beer per week     Comment: Maybe 2 beers a month    Drug use: No    Sexual activity: Yes     Partners: Female     Birth control/protection: None     Social Determinants of Health     Financial Resource Strain: Low Risk     Difficulty of Paying Living Expenses: Not hard at all   Food Insecurity: No Food Insecurity    Worried About Running Out of Food in the Last Year: Never true    Ran Out of Food in the Last Year: Never true   Transportation Needs: Unmet Transportation Needs    Lack of Transportation (Medical): Yes    Lack of Transportation (Non-Medical): Yes   Physical Activity: Insufficiently Active    Days of Exercise per Week: 2 days    Minutes of Exercise per Session: 20 min   Stress: No Stress Concern Present    Feeling of Stress : Not at all   Social Connections: Unknown    Frequency of Communication with Friends and Family: Twice a week    Frequency of Social Gatherings with Friends and Family: Once a week    Active Member of Clubs or Organizations: No    Attends Club or Organization Meetings: Never    Marital Status:    Housing Stability: Low Risk     Unable to Pay for Housing in the Last Year: No    Number of Places Lived in the Last Year: 1    Unstable Housing in the Last Year: No       Review of Systems:   Review of Systems   Constitutional:  Negative for fever and weight loss.   HENT:  Negative for congestion, hearing loss and sore throat.    Eyes:  Negative for blurred vision.   Respiratory:  Negative for cough and shortness of breath.    Cardiovascular:   "Negative for chest pain, palpitations, claudication and leg swelling.   Gastrointestinal:  Negative for abdominal pain, constipation, diarrhea and heartburn.   Genitourinary:  Negative for dysuria.   Musculoskeletal:  Negative for back pain and myalgias.   Skin:  Negative for rash.   Neurological:  Negative for focal weakness and headaches.   Psychiatric/Behavioral:  Negative for depression and suicidal ideas. The patient is not nervous/anxious.       Medications:  Outpatient Encounter Medications as of 6/1/2023   Medication Sig Dispense Refill    multivitamin capsule Take 1 capsule by mouth once daily.      tadalafiL (CIALIS) 20 MG Tab Take 1 tablet (20 mg total) by mouth daily as needed (erectile dysfunction). 30 tablet 11     No facility-administered encounter medications on file as of 6/1/2023.       Allergies:  Review of patient's allergies indicates:   Allergen Reactions    Bactrim [sulfamethoxazole-trimethoprim] Hives    Metronidazole      Abnormal heart rate/tachycardia toward end of treatment with medication possibly anaphylaxis         Physical Exam      Vitals:    06/01/23 1049   BP: 118/80   Pulse: 65   Resp: 18   Temp: 98 °F (36.7 °C)        Vital Signs  Temp: 98 °F (36.7 °C)  Temp Source: Oral  Pulse: 65  Resp: 18  SpO2: 97 %  BP: 118/80  BP Location: Right arm  Patient Position: Sitting  Pain Score: 0-No pain  Height and Weight  Height: 6' 1" (185.4 cm)  Weight: 101.5 kg (223 lb 12.3 oz)  BSA (Calculated - sq m): 2.29 sq meters  BMI (Calculated): 29.5  Weight in (lb) to have BMI = 25: 189.1]     Body mass index is 29.52 kg/m².    Physical Exam  Constitutional:       Appearance: He is well-developed.   HENT:      Head: Normocephalic.   Eyes:      Pupils: Pupils are equal, round, and reactive to light.   Neck:      Thyroid: No thyromegaly.   Cardiovascular:      Rate and Rhythm: Normal rate and regular rhythm.      Heart sounds: No murmur heard.    No friction rub. No gallop.   Pulmonary:      Effort: " Pulmonary effort is normal.      Breath sounds: Normal breath sounds.   Abdominal:      General: Bowel sounds are normal.      Palpations: Abdomen is soft.   Musculoskeletal:         General: Normal range of motion.      Cervical back: Normal range of motion.   Skin:     General: Skin is warm and dry.   Neurological:      Mental Status: He is alert and oriented to person, place, and time.      Sensory: No sensory deficit.   Psychiatric:         Behavior: Behavior normal.        Laboratory:  CBC:  Recent Labs   Lab Result Units 05/31/23  0818   WBC K/uL 9.08   RBC M/uL 4.87   Hemoglobin g/dL 14.5   Hematocrit % 45.3   Platelets K/uL 268   MCV fL 93   MCH pg 29.8   MCHC g/dL 32.0     CMP:  Recent Labs   Lab Result Units 05/31/23  0818   Glucose mg/dL 133*   Calcium mg/dL 9.1   Albumin g/dL 4.4   Total Protein g/dL 7.8   Sodium mmol/L 137   Potassium mmol/L 4.2   CO2 mmol/L 28   Chloride mmol/L 102   BUN mg/dL 19   Alkaline Phosphatase U/L 79   ALT U/L 21   AST U/L 24   Total Bilirubin mg/dL 1.0     URINALYSIS:  No results for input(s): COLORU, CLARITYU, SPECGRAV, PHUR, PROTEINUA, GLUCOSEU, BILIRUBINCON, BLOODU, WBCU, RBCU, BACTERIA, MUCUS, NITRITE, LEUKOCYTESUR, UROBILINOGEN, HYALINECASTS in the last 2160 hours.   LIPIDS:  Recent Labs   Lab Result Units 05/31/23  0818   HDL mg/dL 37*   Cholesterol mg/dL 199   Triglycerides mg/dL 202*   LDL Cholesterol mg/dL 121.6   HDL/Cholesterol Ratio % 18.6*   Non-HDL Cholesterol mg/dL 162   Total Cholesterol/HDL Ratio  5.4*     TSH:  No results for input(s): TSH in the last 2160 hours.  A1C:  Recent Labs   Lab Result Units 05/31/23  0818   Hemoglobin A1C % 6.0*       Radiology:        Assessment:     Paddy Nair is a 67 y.o.male with:    GUILLERMO (obstructive sleep apnea)    Impaired glucose tolerance    History of prostate cancer    Combined arterial insufficiency and corporo-venous occlusive erectile dysfunction    Need for pneumococcal vaccination  -     Pneumococcal  Conjugate Vaccine (13 Valent) (IM)                Plan:     Problem List Items Addressed This Visit          Renal/    Vasculogenic erectile dysfunction    Overview     Cont cialis             Oncology    History of prostate cancer    Overview     Cont per urology             Endocrine    Impaired glucose tolerance    Overview     Cont current regimen   Low carb diet   a1c 6.0             Other    GUILLERMO (obstructive sleep apnea) - Primary    Overview     Is utilizing cpap nightly with great benefit . Using all night everynight          Other Visit Diagnoses       Need for pneumococcal vaccination                As above, continue current medications and maintain follow up with specialists.  Return to clinic in 6 months.      Frederick W Dantagnan [facility]

## 2023-10-10 ENCOUNTER — PATIENT MESSAGE (OUTPATIENT)
Dept: UROLOGY | Facility: CLINIC | Age: 68
End: 2023-10-10
Payer: MEDICARE

## 2023-10-10 DIAGNOSIS — R97.20 ELEVATED PSA, LESS THAN 10 NG/ML: ICD-10-CM

## 2023-10-10 DIAGNOSIS — C61 PROSTATE CANCER: Primary | ICD-10-CM

## 2023-10-18 ENCOUNTER — OFFICE VISIT (OUTPATIENT)
Dept: UROLOGY | Facility: CLINIC | Age: 68
End: 2023-10-18
Payer: MEDICARE

## 2023-10-18 VITALS
HEART RATE: 61 BPM | HEIGHT: 73 IN | SYSTOLIC BLOOD PRESSURE: 126 MMHG | DIASTOLIC BLOOD PRESSURE: 73 MMHG | BODY MASS INDEX: 29.52 KG/M2

## 2023-10-18 DIAGNOSIS — R79.89 LOW TESTOSTERONE: ICD-10-CM

## 2023-10-18 DIAGNOSIS — N40.1 BENIGN PROSTATIC HYPERPLASIA WITH NOCTURIA: ICD-10-CM

## 2023-10-18 DIAGNOSIS — Z80.42 FAMILY HISTORY OF PROSTATE CANCER IN FATHER: ICD-10-CM

## 2023-10-18 DIAGNOSIS — R97.21 RISING PSA FOLLOWING TREATMENT FOR MALIGNANT NEOPLASM OF PROSTATE: Primary | ICD-10-CM

## 2023-10-18 DIAGNOSIS — C61 PROSTATE CANCER: ICD-10-CM

## 2023-10-18 DIAGNOSIS — R35.1 BENIGN PROSTATIC HYPERPLASIA WITH NOCTURIA: ICD-10-CM

## 2023-10-18 PROCEDURE — 99999 PR PBB SHADOW E&M-EST. PATIENT-LVL III: CPT | Mod: PBBFAC,,, | Performed by: UROLOGY

## 2023-10-18 PROCEDURE — 99214 OFFICE O/P EST MOD 30 MIN: CPT | Mod: S$PBB,,, | Performed by: UROLOGY

## 2023-10-18 PROCEDURE — 99214 PR OFFICE/OUTPT VISIT, EST, LEVL IV, 30-39 MIN: ICD-10-PCS | Mod: S$PBB,,, | Performed by: UROLOGY

## 2023-10-18 PROCEDURE — 99999 PR PBB SHADOW E&M-EST. PATIENT-LVL III: ICD-10-PCS | Mod: PBBFAC,,, | Performed by: UROLOGY

## 2023-10-18 PROCEDURE — 99213 OFFICE O/P EST LOW 20 MIN: CPT | Mod: PBBFAC,PO | Performed by: UROLOGY

## 2023-10-18 RX ORDER — TADALAFIL 20 MG/1
20 TABLET ORAL DAILY PRN
Qty: 30 TABLET | Refills: 11 | Status: SHIPPED | OUTPATIENT
Start: 2023-10-18 | End: 2024-10-17

## 2023-10-18 NOTE — PROGRESS NOTES
Subjective:      Patient ID: Paddy Nair is a 68 y.o. male.    Chief Complaint: psa - 6 month    67 yo WM with rising/fluctuating PSA. Post RRP. Now 0.21.  No complaints.    Follow-up  This is a chronic (CAP) problem. The current episode started more than 1 year ago. The problem occurs constantly. The problem has been waxing and waning. Pertinent negatives include no abdominal pain, anorexia, arthralgias, change in bowel habit, chest pain, chills, congestion, coughing, diaphoresis, fatigue, fever, headaches, joint swelling, myalgias, nausea, neck pain, numbness, rash, sore throat, swollen glands, urinary symptoms, vertigo, visual change, vomiting or weakness. Nothing aggravates the symptoms. He has tried nothing for the symptoms. The treatment provided significant relief.     Review of Systems   Constitutional:  Negative for activity change, appetite change, chills, diaphoresis, fatigue, fever and unexpected weight change.   HENT:  Negative for congestion, hearing loss, sinus pressure, sore throat and trouble swallowing.    Eyes:  Negative for photophobia, pain, discharge and visual disturbance.   Respiratory:  Negative for apnea, cough and shortness of breath.    Cardiovascular:  Negative for chest pain, palpitations and leg swelling.   Gastrointestinal:  Negative for abdominal distention, abdominal pain, anal bleeding, anorexia, blood in stool, change in bowel habit, constipation, diarrhea, nausea, rectal pain and vomiting.   Endocrine: Negative for cold intolerance, heat intolerance, polydipsia, polyphagia and polyuria.   Genitourinary:  Negative for decreased urine volume, difficulty urinating, dysuria, enuresis, flank pain, frequency, genital sores, hematuria, penile discharge, penile pain, penile swelling, scrotal swelling, testicular pain and urgency.   Musculoskeletal:  Negative for arthralgias, back pain, joint swelling, myalgias and neck pain.   Skin:  Negative for color change, pallor, rash and  wound.   Allergic/Immunologic: Negative for environmental allergies, food allergies and immunocompromised state.   Neurological:  Negative for dizziness, vertigo, seizures, weakness, numbness and headaches.   Hematological:  Negative for adenopathy. Does not bruise/bleed easily.   Psychiatric/Behavioral: Negative.        Objective:     Physical Exam  Vitals and nursing note reviewed.   Constitutional:       Appearance: Normal appearance. He is well-developed.   HENT:      Head: Normocephalic.      Right Ear: External ear normal.      Left Ear: External ear normal.      Nose: Nose normal.      Mouth/Throat:      Mouth: Mucous membranes are moist.      Pharynx: Oropharynx is clear. No oropharyngeal exudate or posterior oropharyngeal erythema.   Eyes:      Conjunctiva/sclera: Conjunctivae normal.      Pupils: Pupils are equal, round, and reactive to light.   Cardiovascular:      Rate and Rhythm: Normal rate and regular rhythm.      Heart sounds: Normal heart sounds.   Pulmonary:      Effort: Pulmonary effort is normal.      Breath sounds: Normal breath sounds.   Abdominal:      General: Bowel sounds are normal.      Palpations: Abdomen is soft.      Tenderness: There is no right CVA tenderness or left CVA tenderness.   Genitourinary:     Penis: Normal.       Testes: Normal.   Musculoskeletal:         General: Normal range of motion.      Cervical back: Normal range of motion and neck supple.      Right lower leg: No edema.      Left lower leg: No edema.   Skin:     General: Skin is warm and dry.      Capillary Refill: Capillary refill takes less than 2 seconds.      Findings: No lesion or rash.   Neurological:      General: No focal deficit present.      Mental Status: He is alert and oriented to person, place, and time.      Deep Tendon Reflexes: Reflexes are normal and symmetric.   Psychiatric:         Mood and Affect: Mood normal.         Behavior: Behavior normal.         Thought Content: Thought content normal.          Judgment: Judgment normal.        Assessment:      1. Rising PSA following treatment for malignant neoplasm of prostate    2. Benign prostatic hyperplasia with nocturia    3. Low testosterone    4. Prostate cancer    5. Family history of prostate cancer in father      Plan:     Patient Instructions   F/U with PSA in 3 months.   If PSA continues to fluctuate and elevate will consider PSA scan if goes back down will continue to monitor six-month intervals.

## 2023-10-18 NOTE — PATIENT INSTRUCTIONS
F/U with PSA in 3 months.   If PSA continues to fluctuate and elevate will consider PSA scan if goes back down will continue to monitor six-month intervals.

## 2023-11-22 ENCOUNTER — OFFICE VISIT (OUTPATIENT)
Dept: URGENT CARE | Facility: CLINIC | Age: 68
End: 2023-11-22
Payer: MEDICARE

## 2023-11-22 VITALS
HEART RATE: 101 BPM | WEIGHT: 223 LBS | RESPIRATION RATE: 20 BRPM | TEMPERATURE: 99 F | BODY MASS INDEX: 29.55 KG/M2 | SYSTOLIC BLOOD PRESSURE: 114 MMHG | HEIGHT: 73 IN | DIASTOLIC BLOOD PRESSURE: 63 MMHG | OXYGEN SATURATION: 96 %

## 2023-11-22 DIAGNOSIS — R19.7 DIARRHEA, UNSPECIFIED TYPE: ICD-10-CM

## 2023-11-22 DIAGNOSIS — K52.9 GASTROENTERITIS: ICD-10-CM

## 2023-11-22 DIAGNOSIS — R50.9 FEVER, UNSPECIFIED FEVER CAUSE: Primary | ICD-10-CM

## 2023-11-22 LAB
CTP QC/QA: YES
POC MOLECULAR INFLUENZA A AGN: NEGATIVE
POC MOLECULAR INFLUENZA B AGN: NEGATIVE

## 2023-11-22 PROCEDURE — 99204 OFFICE O/P NEW MOD 45 MIN: CPT | Mod: S$GLB,,, | Performed by: PHYSICIAN ASSISTANT

## 2023-11-22 PROCEDURE — 87502 INFLUENZA DNA AMP PROBE: CPT | Mod: QW,S$GLB,, | Performed by: PHYSICIAN ASSISTANT

## 2023-11-22 PROCEDURE — 99204 PR OFFICE/OUTPT VISIT, NEW, LEVL IV, 45-59 MIN: ICD-10-PCS | Mod: S$GLB,,, | Performed by: PHYSICIAN ASSISTANT

## 2023-11-22 PROCEDURE — 87502 POCT INFLUENZA A/B MOLECULAR: ICD-10-PCS | Mod: QW,S$GLB,, | Performed by: PHYSICIAN ASSISTANT

## 2023-11-22 RX ORDER — PROMETHAZINE HYDROCHLORIDE 12.5 MG/1
12.5 TABLET ORAL EVERY 6 HOURS PRN
Qty: 10 TABLET | Refills: 0 | Status: SHIPPED | OUTPATIENT
Start: 2023-11-22 | End: 2023-12-18

## 2023-11-22 RX ORDER — DICYCLOMINE HYDROCHLORIDE 10 MG/1
10 CAPSULE ORAL 3 TIMES DAILY
Qty: 21 CAPSULE | Refills: 0 | Status: SHIPPED | OUTPATIENT
Start: 2023-11-22 | End: 2023-11-29

## 2023-11-22 RX ORDER — DIPHENOXYLATE HYDROCHLORIDE AND ATROPINE SULFATE 2.5; .025 MG/1; MG/1
1 TABLET ORAL 3 TIMES DAILY PRN
Qty: 6 TABLET | Refills: 0 | Status: SHIPPED | OUTPATIENT
Start: 2023-11-22 | End: 2024-03-07

## 2023-11-22 NOTE — PROGRESS NOTES
"Subjective:      Patient ID: Paddy Nair is a 68 y.o. male.    Vitals:  height is 6' 1" (1.854 m) and weight is 101.2 kg (223 lb). His oral temperature is 99 °F (37.2 °C). His blood pressure is 114/63 and his pulse is 101. His respiration is 20 and oxygen saturation is 96%.     Chief Complaint: Sinus Problem    Pt complains of vomiting, diarrhea and fever. Symptoms started Monday and pt has been taking imodium. Pt reports a 102 fever today around 7:30 am and has not had any medications.  Patient states that he is not had any vomiting or diarrhea since 3:00 a.m. this morning.    Sinus Problem  This is a new problem. The current episode started in the past 7 days. The problem has been gradually worsening since onset. The maximum temperature recorded prior to his arrival was 102 - 102.9 F. His pain is at a severity of 0/10. He is experiencing no pain. Pertinent negatives include no chills, congestion, coughing, diaphoresis or ear pain. Past treatments include nothing. The treatment provided no relief.       Constitution: Negative for chills and sweating.   HENT:  Negative for ear pain and congestion.    Respiratory:  Negative for cough.    Gastrointestinal:  Positive for nausea, vomiting and diarrhea.   Skin:  Negative for erythema.      Objective:     Physical Exam   Constitutional: He is oriented to person, place, and time. He appears well-developed. He is cooperative.  Non-toxic appearance. He does not appear ill. No distress.   HENT:   Head: Normocephalic and atraumatic.   Ears:   Right Ear: Hearing, tympanic membrane, external ear and ear canal normal. impacted cerumen  Left Ear: Hearing, tympanic membrane, external ear and ear canal normal. impacted cerumen  Nose: Nose normal. No mucosal edema, rhinorrhea, nasal deformity or congestion. No epistaxis. Right sinus exhibits no maxillary sinus tenderness and no frontal sinus tenderness. Left sinus exhibits no maxillary sinus tenderness and no frontal sinus " tenderness.   Mouth/Throat: Uvula is midline, oropharynx is clear and moist and mucous membranes are normal. No trismus in the jaw. Normal dentition. No uvula swelling. No oropharyngeal exudate, posterior oropharyngeal edema or posterior oropharyngeal erythema.   Eyes: Conjunctivae, EOM and lids are normal. Pupils are equal, round, and reactive to light. Right eye exhibits no discharge. Left eye exhibits no discharge. No scleral icterus.   Neck: Trachea normal and phonation normal. Neck supple. No JVD present. No tracheal deviation present. No thyromegaly present. No edema present. No erythema present. No neck rigidity present.   Cardiovascular: Normal rate, regular rhythm and normal pulses.   No murmur heard.  Pulmonary/Chest: Effort normal. No stridor. No respiratory distress. He has no decreased breath sounds. He has no wheezes. He has no rhonchi.   Abdominal: Normal appearance. He exhibits no distension. Soft. There is no abdominal tenderness. There is no rebound, no guarding, no left CVA tenderness and no right CVA tenderness.   Musculoskeletal: Normal range of motion.         General: No deformity. Normal range of motion.   Neurological: He is alert and oriented to person, place, and time. He exhibits normal muscle tone. Coordination normal.   Skin: Skin is warm, dry, intact, not diaphoretic, not pale and no rash. Capillary refill takes less than 2 seconds. No erythema   Psychiatric: His speech is normal and behavior is normal. Judgment and thought content normal.   Nursing note and vitals reviewed.    Results for orders placed or performed in visit on 11/22/23   POCT Influenza A/B Molecular   Result Value Ref Range    POC Molecular Influenza A Ag Negative Negative, Not Reported    POC Molecular Influenza B Ag Negative Negative, Not Reported     Acceptable Yes     No results found.     Assessment:     1. Fever, unspecified fever cause    2. Diarrhea, unspecified type    3. Gastroenteritis         Plan:       Fever, unspecified fever cause  -     POCT Influenza A/B Molecular    Diarrhea, unspecified type  -     dicyclomine (BENTYL) 10 MG capsule; Take 1 capsule (10 mg total) by mouth 3 (three) times daily. for 21 doses  Dispense: 21 capsule; Refill: 0  -     diphenoxylate-atropine 2.5-0.025 mg (LOMOTIL) 2.5-0.025 mg per tablet; Take 1 tablet by mouth 3 (three) times daily as needed for Diarrhea (Diarrhea).  Dispense: 6 tablet; Refill: 0    Gastroenteritis  -     promethazine (PHENERGAN) 12.5 MG Tab; Take 1 tablet (12.5 mg total) by mouth every 6 (six) hours as needed (Nausea vomiting).  Dispense: 10 tablet; Refill: 0  -     dicyclomine (BENTYL) 10 MG capsule; Take 1 capsule (10 mg total) by mouth 3 (three) times daily. for 21 doses  Dispense: 21 capsule; Refill: 0      Follow up if symptoms worsen or fail to improve, for F/U with PCP or ED. There are no Patient Instructions on file for this visit.

## 2023-12-18 ENCOUNTER — OFFICE VISIT (OUTPATIENT)
Dept: PRIMARY CARE CLINIC | Facility: CLINIC | Age: 68
End: 2023-12-18
Payer: MEDICARE

## 2023-12-18 VITALS
BODY MASS INDEX: 29.92 KG/M2 | TEMPERATURE: 98 F | RESPIRATION RATE: 18 BRPM | HEART RATE: 56 BPM | WEIGHT: 225.75 LBS | HEIGHT: 73 IN | DIASTOLIC BLOOD PRESSURE: 80 MMHG | SYSTOLIC BLOOD PRESSURE: 120 MMHG | OXYGEN SATURATION: 96 %

## 2023-12-18 DIAGNOSIS — Z12.12 ENCOUNTER FOR COLORECTAL CANCER SCREENING: ICD-10-CM

## 2023-12-18 DIAGNOSIS — Z85.46 HISTORY OF PROSTATE CANCER: ICD-10-CM

## 2023-12-18 DIAGNOSIS — Z12.11 ENCOUNTER FOR COLORECTAL CANCER SCREENING: ICD-10-CM

## 2023-12-18 DIAGNOSIS — N40.1 BENIGN PROSTATIC HYPERPLASIA WITH NOCTURIA: ICD-10-CM

## 2023-12-18 DIAGNOSIS — R97.20 ELEVATED PSA, LESS THAN 10 NG/ML: ICD-10-CM

## 2023-12-18 DIAGNOSIS — R35.1 BENIGN PROSTATIC HYPERPLASIA WITH NOCTURIA: ICD-10-CM

## 2023-12-18 DIAGNOSIS — Z23 INFLUENZA VACCINATION GIVEN: ICD-10-CM

## 2023-12-18 DIAGNOSIS — G47.33 OSA (OBSTRUCTIVE SLEEP APNEA): Primary | ICD-10-CM

## 2023-12-18 PROCEDURE — 99999PBSHW FLU VACCINE - QUADRIVALENT - ADJUVANTED: ICD-10-PCS | Mod: PBBFAC,,,

## 2023-12-18 PROCEDURE — 99999 PR PBB SHADOW E&M-EST. PATIENT-LVL IV: ICD-10-PCS | Mod: PBBFAC,,, | Performed by: INTERNAL MEDICINE

## 2023-12-18 PROCEDURE — 99999PBSHW FLU VACCINE - QUADRIVALENT - ADJUVANTED: Mod: PBBFAC,,,

## 2023-12-18 PROCEDURE — 99214 OFFICE O/P EST MOD 30 MIN: CPT | Mod: S$PBB,,, | Performed by: INTERNAL MEDICINE

## 2023-12-18 PROCEDURE — 99214 PR OFFICE/OUTPT VISIT, EST, LEVL IV, 30-39 MIN: ICD-10-PCS | Mod: S$PBB,,, | Performed by: INTERNAL MEDICINE

## 2023-12-18 PROCEDURE — 99214 OFFICE O/P EST MOD 30 MIN: CPT | Mod: PBBFAC,25 | Performed by: INTERNAL MEDICINE

## 2023-12-18 PROCEDURE — 99999 PR PBB SHADOW E&M-EST. PATIENT-LVL IV: CPT | Mod: PBBFAC,,, | Performed by: INTERNAL MEDICINE

## 2023-12-18 PROCEDURE — G0008 ADMIN INFLUENZA VIRUS VAC: HCPCS | Mod: PBBFAC

## 2023-12-18 NOTE — PROGRESS NOTES
[facility] Note    Chief Complaint      Chief Complaint   Patient presents with    Follow-up     6m       History of Present Illness      Paddy Nair is a 68 y.o. male who presents today for   Chief Complaint   Patient presents with    Follow-up     6m   .  Patient comes to appointment here for 6 m checkup for chronic issues as below . He is feeing well is compliant with all med's and diet . He will be seeing dr shelley in 1/23 . He admits he could be more active with exercise .     HPI    No problem-specific Assessment & Plan notes found for this encounter.       Problem List Items Addressed This Visit          Renal/    Elevated PSA, less than 10 ng/ml    Overview     Cont current f/u with dr shelley         Benign prostatic hyperplasia with nocturia    Overview     Dr shelley managing             Oncology    History of prostate cancer    Overview     Cont per urology             Other    GUILLERMO (obstructive sleep apnea) - Primary    Overview     Is utilizing cpap nightly with great benefit . Using all night everynight             Past Medical History:  Past Medical History:   Diagnosis Date    Elevated PSA, less than 10 ng/ml 8/21/2018    Urinary tract infection        Past Surgical History:  Past Surgical History:   Procedure Laterality Date    EXCISION OF LESION OF SCROTUM Right 09/23/2019    Procedure: EXCISION, LESION, SCROTUM;  Surgeon: Gagandeep Shelley MD;  Location: [facility] OR;  Service: Urology;  Laterality: Right;    PROSTATE SURGERY  9/21/19    RADICAL RETROPUBIC PROSTATECTOMY N/A 09/23/2019    Procedure: PROSTATECTOMY, RETROPUBIC, RADICAL,;  Surgeon: Gagandeep Shelley MD;  Location: [facility] OR;  Service: Urology;  Laterality: N/A;    TONSILLECTOMY         Family History:  family history includes Cancer in his brother and father; Diabetes in his maternal aunt and maternal grandmother; No Known Problems in his mother.     Social History:  Social History     Socioeconomic  History    Marital status:    Tobacco Use    Smoking status: Former     Current packs/day: 0.00     Types: Cigarettes     Start date: 1975     Quit date: 2017     Years since quittin.0    Smokeless tobacco: Never   Substance and Sexual Activity    Alcohol use: Yes     Alcohol/week: 1.0 - 2.0 standard drink of alcohol     Types: 1 - 2 Cans of beer per week     Comment: Maybe 2 beers a month    Drug use: No    Sexual activity: Yes     Partners: Female     Birth control/protection: None     Social Determinants of Health     Financial Resource Strain: Low Risk  (2022)    Overall Financial Resource Strain (CARDIA)     Difficulty of Paying Living Expenses: Not hard at all   Food Insecurity: No Food Insecurity (2022)    Hunger Vital Sign     Worried About Running Out of Food in the Last Year: Never true     Ran Out of Food in the Last Year: Never true   Transportation Needs: Unmet Transportation Needs (2022)    PRAPARE - Transportation     Lack of Transportation (Medical): Yes     Lack of Transportation (Non-Medical): Yes   Physical Activity: Insufficiently Active (2022)    Exercise Vital Sign     Days of Exercise per Week: 2 days     Minutes of Exercise per Session: 20 min   Stress: No Stress Concern Present (2022)    [employer] [place] [employer] - Occupational Stress Questionnaire     Feeling of Stress : Not at all   Social Connections: Unknown (2022)    Social Connection and Isolation Panel [NHANES]     Frequency of Communication with Friends and Family: Twice a week     Frequency of Social Gatherings with Friends and Family: Once a week     Active Member of Clubs or Organizations: No     Attends Club or Organization Meetings: Never     Marital Status:    Housing Stability: Low Risk  (2022)    Housing Stability Vital Sign     Unable to Pay for Housing in the Last Year: No     Number of Places Lived in the Last Year: 1     Unstable Housing  in the Last Year: No       Review of Systems:   Review of Systems   Constitutional:  Negative for fever and weight loss.   HENT:  Negative for congestion, hearing loss and sore throat.    Eyes:  Negative for blurred vision.   Respiratory:  Negative for cough and shortness of breath.    Cardiovascular:  Negative for chest pain, palpitations, claudication and leg swelling.   Gastrointestinal:  Negative for abdominal pain, constipation, diarrhea and heartburn.   Genitourinary:  Negative for dysuria.   Musculoskeletal:  Negative for back pain and myalgias.   Skin:  Negative for rash.   Neurological:  Negative for focal weakness and headaches.   Psychiatric/Behavioral:  Negative for depression and suicidal ideas. The patient is not nervous/anxious.         Medications:  Outpatient Encounter Medications as of 2023   Medication Sig Dispense Refill    diphenoxylate-atropine 2.5-0.025 mg (LOMOTIL) 2.5-0.025 mg per tablet Take 1 tablet by mouth 3 (three) times daily as needed for Diarrhea (Diarrhea). 6 tablet 0    multivitamin capsule Take 1 capsule by mouth once daily.      tadalafiL (CIALIS) 20 MG Tab Take 1 tablet (20 mg total) by mouth daily as needed (erectile dysfunction). 30 tablet 11    [] dicyclomine (BENTYL) 10 MG capsule Take 1 capsule (10 mg total) by mouth 3 (three) times daily. for 21 doses 21 capsule 0    [DISCONTINUED] promethazine (PHENERGAN) 12.5 MG Tab Take 1 tablet (12.5 mg total) by mouth every 6 (six) hours as needed (Nausea vomiting). (Patient not taking: Reported on 2023) 10 tablet 0     No facility-administered encounter medications on file as of 2023.       Allergies:  Review of patient's allergies indicates:   Allergen Reactions    Bactrim [sulfamethoxazole-trimethoprim] Hives    Metronidazole      Abnormal heart rate/tachycardia toward end of treatment with medication possibly anaphylaxis         Physical Exam      Vitals:    23 1005   BP: 120/80   Pulse: (!) 56  "  Resp: 18   Temp: 98 °F (36.7 °C)        Vital Signs  Temp: 98 °F (36.7 °C)  Temp Source: Oral  Pulse: (!) 56  Resp: 18  SpO2: 96 %  BP: 120/80  BP Location: Right arm  Patient Position: Sitting  Pain Score: 0-No pain  Height and Weight  Height: 6' 1" (185.4 cm)  Weight: 102.4 kg (225 lb 12 oz)  BSA (Calculated - sq m): 2.3 sq meters  BMI (Calculated): 29.8  Weight in (lb) to have BMI = 25: 189.1]     Body mass index is 29.78 kg/m².    Physical Exam  Constitutional:       Appearance: He is well-developed.   HENT:      Head: Normocephalic.   Eyes:      Pupils: Pupils are equal, round, and reactive to light.   Neck:      Thyroid: No thyromegaly.   Cardiovascular:      Rate and Rhythm: Normal rate and regular rhythm.      Heart sounds: No murmur heard.     No friction rub. No gallop.   Pulmonary:      Effort: Pulmonary effort is normal.      Breath sounds: Normal breath sounds.   Abdominal:      General: Bowel sounds are normal.      Palpations: Abdomen is soft.   Musculoskeletal:         General: Normal range of motion.      Cervical back: Normal range of motion.   Skin:     General: Skin is warm and dry.   Neurological:      Mental Status: He is alert and oriented to person, place, and time.      Sensory: No sensory deficit.   Psychiatric:         Behavior: Behavior normal.          Laboratory:  CBC:  No results for input(s): "WBC", "RBC", "HGB", "HCT", "PLT", "MCV", "MCH", "MCHC" in the last 2160 hours.  CMP:  No results for input(s): "GLU", "CALCIUM", "ALBUMIN", "PROT", "NA", "K", "CO2", "CL", "BUN", "ALKPHOS", "ALT", "AST", "BILITOT" in the last 2160 hours.    Invalid input(s): "CREATININ"  URINALYSIS:  No results for input(s): "COLORU", "CLARITYU", "SPECGRAV", "PHUR", "PROTEINUA", "GLUCOSEU", "BILIRUBINCON", "BLOODU", "WBCU", "RBCU", "BACTERIA", "MUCUS", "NITRITE", "LEUKOCYTESUR", "UROBILINOGEN", "HYALINECASTS" in the last 2160 hours.   LIPIDS:  No results for input(s): "TSH", "HDL", "CHOL", "TRIG", "LDLCALC", " ""CHOLHDL", "NONHDLCHOL", "TOTALCHOLEST" in the last 2160 hours.  TSH:  No results for input(s): "TSH" in the last 2160 hours.  A1C:  No results for input(s): "HGBA1C" in the last 2160 hours.    Radiology:        Assessment:     Paddy Nair is a 68 y.o.male with:    GUILLERMO (obstructive sleep apnea)    Benign prostatic hyperplasia with nocturia    Elevated PSA, less than 10 ng/ml    History of prostate cancer                Plan:     Problem List Items Addressed This Visit          Renal/    Elevated PSA, less than 10 ng/ml    Overview     Cont current f/u with dr shelley         Benign prostatic hyperplasia with nocturia    Overview     Dr shelley managing             Oncology    History of prostate cancer    Overview     Cont per urology             Other    GUILLERMO (obstructive sleep apnea) - Primary    Overview     Is utilizing cpap nightly with great benefit . Using all night everynight            As above, continue current medications and maintain follow up with specialists.  Return to clinic in 6 months.      Frederick W Dantagnan [facility]                  "

## 2024-01-03 ENCOUNTER — PATIENT MESSAGE (OUTPATIENT)
Dept: UROLOGY | Facility: CLINIC | Age: 69
End: 2024-01-03
Payer: MEDICARE

## 2024-01-31 ENCOUNTER — PATIENT MESSAGE (OUTPATIENT)
Dept: UROLOGY | Facility: CLINIC | Age: 69
End: 2024-01-31
Payer: MEDICARE

## 2024-01-31 DIAGNOSIS — R97.21 RISING PSA FOLLOWING TREATMENT FOR MALIGNANT NEOPLASM OF PROSTATE: Primary | ICD-10-CM

## 2024-02-06 ENCOUNTER — HOSPITAL ENCOUNTER (OUTPATIENT)
Dept: RADIOLOGY | Facility: HOSPITAL | Age: 69
Discharge: HOME | End: 2024-02-06
Attending: UROLOGY
Payer: MEDICARE

## 2024-02-06 DIAGNOSIS — R97.21 RISING PSA FOLLOWING TREATMENT FOR MALIGNANT NEOPLASM OF PROSTATE: ICD-10-CM

## 2024-02-06 PROCEDURE — 78815 PET IMAGE W/CT SKULL-THIGH: CPT | Mod: TC,PS

## 2024-02-06 PROCEDURE — A9595 HC PIFLUFOLASTAT F-18, DX, PER 1 MCI: HCPCS

## 2024-02-06 PROCEDURE — 78815 PET IMAGE W/CT SKULL-THIGH: CPT | Mod: 26,PI,, | Performed by: STUDENT IN AN ORGANIZED HEALTH CARE EDUCATION/TRAINING PROGRAM

## 2024-02-19 ENCOUNTER — PATIENT MESSAGE (OUTPATIENT)
Dept: UROLOGY | Facility: CLINIC | Age: 69
End: 2024-02-19
Payer: MEDICARE

## 2024-02-19 DIAGNOSIS — C61 PROSTATE CANCER: Primary | ICD-10-CM

## 2024-02-19 LAB — NONINV COLON CA DNA+OCC BLD SCRN STL QL: NEGATIVE

## 2024-02-20 ENCOUNTER — PATIENT MESSAGE (OUTPATIENT)
Dept: UROLOGY | Facility: CLINIC | Age: 69
End: 2024-02-20
Payer: MEDICARE

## 2024-02-22 DIAGNOSIS — C61 PROSTATE CANCER: Primary | ICD-10-CM

## 2024-03-06 ENCOUNTER — TELEPHONE (OUTPATIENT)
Dept: UROLOGY | Facility: CLINIC | Age: 69
End: 2024-03-06
Payer: MEDICARE

## 2024-03-06 ENCOUNTER — CLINICAL SUPPORT (OUTPATIENT)
Dept: UROLOGY | Facility: CLINIC | Age: 69
End: 2024-03-06
Payer: MEDICARE

## 2024-03-06 DIAGNOSIS — R97.20 ELEVATED PSA, LESS THAN 10 NG/ML: ICD-10-CM

## 2024-03-06 DIAGNOSIS — C61 PROSTATE CANCER: Primary | ICD-10-CM

## 2024-03-06 PROCEDURE — 99999 PR PBB SHADOW E&M-EST. PATIENT-LVL I: CPT | Mod: PBBFAC,,,

## 2024-03-06 PROCEDURE — 96402 CHEMO HORMON ANTINEOPL SQ/IM: CPT | Mod: PBBFAC,PO

## 2024-03-06 PROCEDURE — 99211 OFF/OP EST MAY X REQ PHY/QHP: CPT | Mod: PBBFAC,PO,25

## 2024-03-06 PROCEDURE — 99999PBSHW PR PBB SHADOW TECHNICAL ONLY FILED TO HB: Mod: JZ,PBBFAC,,

## 2024-03-06 RX ADMIN — TRIPTORELIN PAMOATE 11.25 MG: KIT at 10:03

## 2024-03-06 NOTE — TELEPHONE ENCOUNTER
I called and arranged a nurse appt with the patient for first Trelstar injection per Dr. Carrillo's direction (see below). I called and explained the plan of care to the patient. He verbalized understanding of this.

## 2024-03-06 NOTE — TELEPHONE ENCOUNTER
----- Message from Gagandeep Carrillo MD sent at 2/19/2024 11:45 AM CST -----  Patient needs to follow up with me.  I just called a prescription for Casodex 50 mg daily.  I have sent a referral for ambulatory evaluation by Radiation Oncology.  Patient will need a Trelstar injection in the next 2-3 weeks.  Patient should come in to discuss treatment next week.

## 2024-03-06 NOTE — PROGRESS NOTES
Dr. Gagandeep Carrillo spoke with patient regarding his Trelstar treatment. Will give Trelstar  11.25mg IM on Rt upper outer gluteal. Pt counseled to remain in clinic for 15min post injection.

## 2024-03-07 ENCOUNTER — OFFICE VISIT (OUTPATIENT)
Dept: RADIATION ONCOLOGY | Facility: CLINIC | Age: 69
End: 2024-03-07
Payer: MEDICARE

## 2024-03-07 VITALS
BODY MASS INDEX: 29.69 KG/M2 | WEIGHT: 224 LBS | DIASTOLIC BLOOD PRESSURE: 75 MMHG | HEIGHT: 73 IN | HEART RATE: 58 BPM | RESPIRATION RATE: 16 BRPM | SYSTOLIC BLOOD PRESSURE: 134 MMHG

## 2024-03-07 DIAGNOSIS — R94.8 ABNORMAL POSITRON EMISSION TOMOGRAPHY (PET) SCAN: ICD-10-CM

## 2024-03-07 DIAGNOSIS — R97.21 RISING PSA FOLLOWING TREATMENT FOR MALIGNANT NEOPLASM OF PROSTATE: ICD-10-CM

## 2024-03-07 DIAGNOSIS — C61 PROSTATE CANCER: Primary | ICD-10-CM

## 2024-03-07 PROCEDURE — 99204 OFFICE O/P NEW MOD 45 MIN: CPT | Mod: S$PBB,,, | Performed by: RADIOLOGY

## 2024-03-07 PROCEDURE — 99213 OFFICE O/P EST LOW 20 MIN: CPT | Mod: PBBFAC | Performed by: RADIOLOGY

## 2024-03-07 PROCEDURE — 99999 PR PBB SHADOW E&M-EST. PATIENT-LVL III: CPT | Mod: PBBFAC,,, | Performed by: RADIOLOGY

## 2024-03-07 NOTE — PROGRESS NOTES
Multidisciplinary Uro-Oncology Clinic  [facility] - Radiation Oncology     HISTORY OF PRESENT ILLNESS:   This patient presents for discussion of salvage irradiation for recurrent prostate cancer.      Mr. Nair presented to Dr. Carrillo in 2019 for evaluation of an elevated PSA of 5.4 ng/ml.  Subsequent biopsies revealed Bianca 6 (3+3) adenocarcinoma involving cores from the Rt. base, Rt. mid gland and Lt. mid gland.  He underwent RALP on 9/23/19.  Pathology revealed a 26 gm prostate measuring 4.5 x 3.5 x 2.2 cm.  There was [place] 7 (3+4) adenocarcinoma involving 15% of the gland.  The Bianca pattern 4 accounted for 10% of the tumor.  There was no extraprostatic extension, bladder neck invasion or seminal vesicle invasion.  Five pelvic nodes were negative for tumor involvement.  Postoperative PSA returned at 0.06 ng/ml.  Although it fluctuated repeat PSA in April ov 2023 was 0.07 ng/ml.  PSA in October of 2023 increased to 0.21 ng/ml.  Repeat PSA returned at 0.26 ng/ml.  PSMA scan on 2/6/24 revealed focal increased uptake in the midline prostatectomy bed. concerning for local cancer recurrence.  There was no evidence of regional or distant metastatic disease.  The patient began bicalutamide and given a 3 month trelstar injection on 3/6/24.      REVIEW OF SYSTEMS:   Review of Systems   Constitutional:  Negative for chills, fever, malaise/fatigue and weight loss.   Respiratory:  Negative for cough and shortness of breath.    Cardiovascular:  Negative for chest pain and palpitations.   Gastrointestinal:  Negative for abdominal pain, constipation and diarrhea.   Genitourinary:  Negative for dysuria, frequency, hematuria and urgency.        AUA 0, 0, 1, 0, 0, 1, 1 delighted denies incontinence.      PAST MEDICAL HISTORY:  Past Medical History:   Diagnosis Date    Elevated PSA, less than 10 ng/ml 8/21/2018    Urinary tract infection        PAST SURGICAL HISTORY:  Past Surgical History:    Procedure Laterality Date    EXCISION OF LESION OF SCROTUM Right 2019    Procedure: EXCISION, LESION, SCROTUM;  Surgeon: Gagandeep Carrillo MD;  Location: [facility] OR;  Service: Urology;  Laterality: Right;    PROSTATE SURGERY  19    RADICAL RETROPUBIC PROSTATECTOMY N/A 2019    Procedure: PROSTATECTOMY, RETROPUBIC, RADICAL,;  Surgeon: Gagandeep Carrillo MD;  Location: [facility] OR;  Service: Urology;  Laterality: N/A;    TONSILLECTOMY         ALLERGIES:   Review of patient's allergies indicates:   Allergen Reactions    Bactrim [sulfamethoxazole-trimethoprim] Hives    Metronidazole      Abnormal heart rate/tachycardia toward end of treatment with medication possibly anaphylaxis       MEDICATIONS:  Current Outpatient Medications   Medication Sig    bicalutamide (CASODEX) 50 MG Tab Take 1 tablet (50 mg total) by mouth once daily.    diphenoxylate-atropine 2.5-0.025 mg (LOMOTIL) 2.5-0.025 mg per tablet Take 1 tablet by mouth 3 (three) times daily as needed for Diarrhea (Diarrhea).    multivitamin capsule Take 1 capsule by mouth once daily.    tadalafiL (CIALIS) 20 MG Tab Take 1 tablet (20 mg total) by mouth daily as needed (erectile dysfunction).     No current facility-administered medications for this visit.       SOCIAL HISTORY:  Social History     Socioeconomic History    Marital status:    Tobacco Use    Smoking status: Former     Current packs/day: 0.00     Types: Cigarettes     Start date: 1975     Quit date: 2017     Years since quittin.2    Smokeless tobacco: Never   Substance and Sexual Activity    Alcohol use: Yes     Alcohol/week: 1.0 - 2.0 standard drink of alcohol     Types: 1 - 2 Cans of beer per week     Comment: Maybe 2 beers a month    Drug use: No    Sexual activity: Yes     Partners: Female     Birth control/protection: None     Social Determinants of Health     Financial Resource Strain: Low Risk  (2024)    Overall Financial Resource Strain (CARDIA)     Difficulty of  Paying Living Expenses: Not hard at all   Food Insecurity: No Food Insecurity (2/29/2024)    Hunger Vital Sign     Worried About Running Out of Food in the Last Year: Never true     Ran Out of Food in the Last Year: Never true   Transportation Needs: No Transportation Needs (2/29/2024)    PRAPARE - Transportation     Lack of Transportation (Medical): No     Lack of Transportation (Non-Medical): No   Physical Activity: Unknown (2/29/2024)    Exercise Vital Sign     Days of Exercise per Week: 0 days   Stress: No Stress Concern Present (2/29/2024)    [employer] [place] [employer] - Occupational Stress Questionnaire     Feeling of Stress : Not at all   Social Connections: Unknown (2/29/2024)    Social Connection and Isolation Panel [NHANES]     Frequency of Communication with Friends and Family: Once a week     Frequency of Social Gatherings with Friends and Family: Once a week     Active Member of Clubs or Organizations: No     Attends Club or Organization Meetings: Never     Marital Status:    Housing Stability: Low Risk  (2/29/2024)    Housing Stability Vital Sign     Unable to Pay for Housing in the Last Year: No     Number of Places Lived in the Last Year: 1     Unstable Housing in the Last Year: No       FAMILY HISTORY:  Family History   Problem Relation Age of Onset    No Known Problems Mother     Cancer Father         Prostate Cancer    Diabetes Maternal Grandmother     Diabetes Maternal Aunt     Cancer Brother         Prostate Cancer    Prostate cancer Neg Hx     Kidney disease Neg Hx          PHYSICAL EXAMINATION:  There were no vitals filed for this visit.  Physical Exam  Constitutional:       General: He is not in acute distress.     Appearance: Normal appearance.   Pulmonary:      Effort: Pulmonary effort is normal. No respiratory distress.   Abdominal:      General: There is no distension.      Palpations: Abdomen is soft.   Neurological:      Mental Status: He is alert and oriented to  person, place, and time.   Psychiatric:         Mood and Affect: Mood normal.         Judgment: Judgment normal.       ASSESSMENT/PLAN:  History of pathologic Stage II (T2, N0, M0, PSA < 10, GG2) prostate cancer with evidence of local recurrence.    ECO    I had a long discussion with the patient and his wife.  Discussed his increasing PSA and PSMA findings.  Reviewed the rational for salvage irradiation to the prostate bed and pelvic nodes combined with short course (6 - 12 months) of hormonal therapy. Discussed the procedures, risks and benefits of radiotherapy.  Explained the acute and long term side effects of therapy.  He is planning a trip in April and returns after .  Will plan to begin his radiotherapy after he returns.  Thank you for allowing us to participate in the care of this patient.  Plan follow up in April for simulation.     Psychosocial Distress screening score of Distress Score: 0 - No Distress noted and reviewed. No intervention indicated.     I spent approximately 45 minutes reviewing the available records and evaluating the patient, out of which over 50% of the time was spent face to face with the patient in counseling and coordinating this patient's care.

## 2024-04-24 ENCOUNTER — HOSPITAL ENCOUNTER (OUTPATIENT)
Dept: RADIATION THERAPY | Facility: HOSPITAL | Age: 69
Discharge: HOME | End: 2024-04-24
Attending: INTERNAL MEDICINE
Payer: MEDICARE

## 2024-04-24 PROCEDURE — 77263 THER RADIOLOGY TX PLNG CPLX: CPT | Mod: ,,, | Performed by: RADIOLOGY

## 2024-04-24 PROCEDURE — 77334 RADIATION TREATMENT AID(S): CPT | Mod: TC | Performed by: RADIOLOGY

## 2024-04-24 PROCEDURE — 77014 HC CT GUIDANCE RADIATION THERAPY FLDS PLACEMENT: CPT | Mod: TC | Performed by: RADIOLOGY

## 2024-04-24 PROCEDURE — 77334 RADIATION TREATMENT AID(S): CPT | Mod: 26,,, | Performed by: RADIOLOGY

## 2024-04-24 PROCEDURE — 77014 PR  CT GUIDANCE PLACEMENT RAD THERAPY FIELDS: CPT | Mod: 26,,, | Performed by: RADIOLOGY

## 2024-05-01 ENCOUNTER — HOSPITAL ENCOUNTER (OUTPATIENT)
Dept: RADIATION THERAPY | Facility: HOSPITAL | Age: 69
Discharge: HOME | End: 2024-05-01
Attending: RADIOLOGY
Payer: MEDICARE

## 2024-05-06 PROCEDURE — 77301 RADIOTHERAPY DOSE PLAN IMRT: CPT | Mod: TC | Performed by: RADIOLOGY

## 2024-05-06 PROCEDURE — 77301 RADIOTHERAPY DOSE PLAN IMRT: CPT | Mod: 26,,, | Performed by: RADIOLOGY

## 2024-05-07 PROCEDURE — 77300 RADIATION THERAPY DOSE PLAN: CPT | Mod: 26,,, | Performed by: RADIOLOGY

## 2024-05-07 PROCEDURE — 77338 DESIGN MLC DEVICE FOR IMRT: CPT | Mod: TC | Performed by: RADIOLOGY

## 2024-05-07 PROCEDURE — 77300 RADIATION THERAPY DOSE PLAN: CPT | Mod: TC | Performed by: RADIOLOGY

## 2024-05-07 PROCEDURE — 77338 DESIGN MLC DEVICE FOR IMRT: CPT | Mod: 26,,, | Performed by: RADIOLOGY

## 2024-05-08 ENCOUNTER — OFFICE VISIT (OUTPATIENT)
Dept: SLEEP MEDICINE | Facility: CLINIC | Age: 69
End: 2024-05-08
Payer: MEDICARE

## 2024-05-08 VITALS
HEIGHT: 73 IN | BODY MASS INDEX: 29.6 KG/M2 | WEIGHT: 223.31 LBS | HEART RATE: 61 BPM | DIASTOLIC BLOOD PRESSURE: 83 MMHG | SYSTOLIC BLOOD PRESSURE: 130 MMHG

## 2024-05-08 DIAGNOSIS — G47.33 OSA (OBSTRUCTIVE SLEEP APNEA): Primary | ICD-10-CM

## 2024-05-08 PROCEDURE — 99213 OFFICE O/P EST LOW 20 MIN: CPT | Mod: PBBFAC | Performed by: PHYSICIAN ASSISTANT

## 2024-05-08 PROCEDURE — 99999 PR PBB SHADOW E&M-EST. PATIENT-LVL III: CPT | Mod: PBBFAC,,, | Performed by: PHYSICIAN ASSISTANT

## 2024-05-08 PROCEDURE — 99213 OFFICE O/P EST LOW 20 MIN: CPT | Mod: S$PBB,,, | Performed by: PHYSICIAN ASSISTANT

## 2024-05-08 NOTE — PROGRESS NOTES
Referred by No ref. provider found     ESTABLISHED PATIENT VISIT    Paddy Nair  is a pleasant 68 y.o. male  with PMH significant for BPH, PrCA, low T, former smoker, GUILLERMO dx 2018 on CPAP      Here today for: CPAP follow-up     PLAN last visit 5/4/23:   -using and benefiting from CPAP therapy  -continue CPAP 7-13cwp  -CPAP supplies ordered  -discussed GUILLERMO and CPAP with patient in detail, including possible complications of untreated GUILLERMO like heart attack/stroke  -advised on strict driving precautions; advised never to drive drowsy      Since last visit:   Reports using CPAP nightly with good control of sx. States mask interface and pressure settings are comfortable. No complaints at this time. Presents today for annual follow up and to get updated order for CPAP supplies.       PAP history   Problems none   Mask DreamWear Nasal    Pressure 7-13cwp   DME HME   Machine age DS 10/22/18, DS2 rr 10/8/21   Download 5/8/24: 30/30 x 8hrs 14mins, 7-13cwp (11.5), leak (12), AHI 2.8         Past Medical History:   Diagnosis Date    Elevated PSA, less than 10 ng/ml 08/21/2018    Prostate cancer     Urinary tract infection      Patient Active Problem List   Diagnosis    Elevated PSA, less than 10 ng/ml    Vasculogenic erectile dysfunction    Low testosterone    GUILLERMO (obstructive sleep apnea)    Benign prostatic hyperplasia with nocturia    Urinary frequency    Nocturia    Fatigue    Abnormal level of female sex hormones    Elevated PSA    Family history of prostate cancer in father    Prostate cancer    Postoperative state    Sebaceous cyst    Encounter for colorectal cancer screening    History of prostate cancer    Impaired glucose tolerance    Rising PSA following treatment for malignant neoplasm of prostate       Current Outpatient Medications:     bicalutamide (CASODEX) 50 MG Tab, Take 1 tablet (50 mg total) by mouth once daily., Disp: 30 tablet, Rfl: 11    multivitamin capsule, Take 1 capsule by mouth once daily.,  "Disp: , Rfl:     tadalafiL (CIALIS) 20 MG Tab, Take 1 tablet (20 mg total) by mouth daily as needed (erectile dysfunction)., Disp: 30 tablet, Rfl: 11       Vitals:    05/08/24 1117   BP: 130/83   BP Location: Right arm   Patient Position: Sitting   BP Method: Medium (Automatic)   Pulse: 61   Weight: 101.3 kg (223 lb 5.2 oz)   Height: 6' 1" (1.854 m)     Physical Exam:    GEN:   Well-appearing  Psych:  Appropriate affect, demonstrates insight  SKIN:  No rash on the face or bridge of the nose      LABS:   No results found for: "HGB", "CO2"      RECORDS REVIEWED:    HST 10/4/2018: AHI 22, RDI 33    Previous sleep note: 5/4/23    CPAP interrogation 5/8/24: 30/30 x 8hrs 14mins, 7-13cwp (11.5), leak (12), AHI 2.8    ASSESSMENT        5/6/2024     9:34 AM   EPWORTH SLEEPINESS SCALE   Sitting and reading 1   Watching TV 1   Sitting, inactive in a public place (e.g. a theatre or a meeting) 0   As a passenger in a car for an hour without a break 0   Lying down to rest in the afternoon when circumstances permit 1   Sitting and talking to someone 0   Sitting quietly after a lunch without alcohol 0   In a car, while stopped for a few minutes in traffic 0   Total score 3     PROBLEM DESCRIPTION/ Sx on Presentation Interval Hx STATUS   GUILLERMO    + snoring, +arousals  [place] GUILLERMO AHI 22 Wearing nightly with good efficacy  controlled   Daytime Sx    + sleepiness when inactive, occasionally   Denies sleepiness while driving  ESS 17/24 on intake (reviewed from 9/18/18) Much improved on CPAP    improved   Nocturia    x 2 per sleep period 1-2 x nightly stable          PLAN      --using and benefiting from CPAP therapy  -continue CPAP 7-13cwp  -CPAP supplies ordered  -discussed GUILLERMO and PAP with patient in detail, including possible complications of untreated GUILLERMO like heart attack/stroke  -advised on strict driving precautions; advised never to drive drowsy     Advised on plan of care. Answered all patient questions. Patient verbalized " understanding and voiced agreement with plan of care.     RTC 12 months or as needed        The patient was given open opportunity to ask questions and/or express concerns about treatment plan. All questions/concerns were discussed.     Two patient identifiers used prior to evaluation.

## 2024-05-09 PROCEDURE — 77385 HC IMRT, SIMPLE: CPT | Performed by: RADIOLOGY

## 2024-05-09 PROCEDURE — 77014 PR  CT GUIDANCE PLACEMENT RAD THERAPY FIELDS: CPT | Mod: 26,,, | Performed by: RADIOLOGY

## 2024-05-09 PROCEDURE — 77427 RADIATION TX MANAGEMENT X5: CPT | Mod: ,,, | Performed by: RADIOLOGY

## 2024-05-10 PROCEDURE — 77385 HC IMRT, SIMPLE: CPT | Performed by: RADIOLOGY

## 2024-05-10 PROCEDURE — 77014 PR  CT GUIDANCE PLACEMENT RAD THERAPY FIELDS: CPT | Mod: 26,,, | Performed by: RADIOLOGY

## 2024-05-13 PROCEDURE — 77014 PR  CT GUIDANCE PLACEMENT RAD THERAPY FIELDS: CPT | Mod: 26,,, | Performed by: RADIOLOGY

## 2024-05-13 PROCEDURE — 77385 HC IMRT, SIMPLE: CPT | Performed by: RADIOLOGY

## 2024-05-14 PROCEDURE — 77385 HC IMRT, SIMPLE: CPT | Performed by: RADIOLOGY

## 2024-05-14 PROCEDURE — 77014 PR  CT GUIDANCE PLACEMENT RAD THERAPY FIELDS: CPT | Mod: 26,,, | Performed by: RADIOLOGY

## 2024-05-15 ENCOUNTER — DOCUMENTATION ONLY (OUTPATIENT)
Dept: RADIATION ONCOLOGY | Facility: CLINIC | Age: 69
End: 2024-05-15
Payer: MEDICARE

## 2024-05-15 PROCEDURE — 77014 PR  CT GUIDANCE PLACEMENT RAD THERAPY FIELDS: CPT | Mod: 26,,, | Performed by: RADIOLOGY

## 2024-05-15 PROCEDURE — 77336 RADIATION PHYSICS CONSULT: CPT | Performed by: RADIOLOGY

## 2024-05-15 PROCEDURE — 77385 HC IMRT, SIMPLE: CPT | Performed by: RADIOLOGY

## 2024-05-16 PROCEDURE — 77385 HC IMRT, SIMPLE: CPT | Performed by: RADIOLOGY

## 2024-05-16 PROCEDURE — 77014 PR  CT GUIDANCE PLACEMENT RAD THERAPY FIELDS: CPT | Mod: 26,,, | Performed by: RADIOLOGY

## 2024-05-16 PROCEDURE — 77427 RADIATION TX MANAGEMENT X5: CPT | Mod: ,,, | Performed by: RADIOLOGY

## 2024-05-17 PROCEDURE — 77385 HC IMRT, SIMPLE: CPT | Performed by: RADIOLOGY

## 2024-05-17 PROCEDURE — 77014 PR  CT GUIDANCE PLACEMENT RAD THERAPY FIELDS: CPT | Mod: 26,,, | Performed by: RADIOLOGY

## 2024-05-20 PROCEDURE — 77014 PR  CT GUIDANCE PLACEMENT RAD THERAPY FIELDS: CPT | Mod: 26,,, | Performed by: RADIOLOGY

## 2024-05-20 PROCEDURE — 77385 HC IMRT, SIMPLE: CPT | Performed by: RADIOLOGY

## 2024-05-21 ENCOUNTER — DOCUMENTATION ONLY (OUTPATIENT)
Dept: RADIATION ONCOLOGY | Facility: CLINIC | Age: 69
End: 2024-05-21
Payer: MEDICARE

## 2024-05-21 PROCEDURE — 77014 PR  CT GUIDANCE PLACEMENT RAD THERAPY FIELDS: CPT | Mod: 26,,, | Performed by: RADIOLOGY

## 2024-05-21 PROCEDURE — 77385 HC IMRT, SIMPLE: CPT | Performed by: RADIOLOGY

## 2024-05-21 NOTE — PLAN OF CARE
Day 9 of outpatient radiation to the prostate bed. Doing well. No dysuria or hematuria. Some urinary hesitancy. Nocturia x 2. No diarrhea.

## 2024-05-22 PROCEDURE — 77014 PR  CT GUIDANCE PLACEMENT RAD THERAPY FIELDS: CPT | Mod: 26,,, | Performed by: RADIOLOGY

## 2024-05-22 PROCEDURE — 77336 RADIATION PHYSICS CONSULT: CPT | Performed by: RADIOLOGY

## 2024-05-22 PROCEDURE — 77385 HC IMRT, SIMPLE: CPT | Performed by: RADIOLOGY

## 2024-05-23 ENCOUNTER — PATIENT MESSAGE (OUTPATIENT)
Dept: UROLOGY | Facility: CLINIC | Age: 69
End: 2024-05-23
Payer: MEDICARE

## 2024-05-23 PROCEDURE — 77014 PR  CT GUIDANCE PLACEMENT RAD THERAPY FIELDS: CPT | Mod: 26,,, | Performed by: RADIOLOGY

## 2024-05-23 PROCEDURE — 77427 RADIATION TX MANAGEMENT X5: CPT | Mod: ,,, | Performed by: RADIOLOGY

## 2024-05-23 PROCEDURE — 77385 HC IMRT, SIMPLE: CPT | Performed by: RADIOLOGY

## 2024-05-24 PROCEDURE — 77385 HC IMRT, SIMPLE: CPT | Performed by: RADIOLOGY

## 2024-05-24 PROCEDURE — 77014 PR  CT GUIDANCE PLACEMENT RAD THERAPY FIELDS: CPT | Mod: 26,,, | Performed by: RADIOLOGY

## 2024-05-27 PROCEDURE — 77385 HC IMRT, SIMPLE: CPT | Performed by: RADIOLOGY

## 2024-05-27 PROCEDURE — 77014 PR  CT GUIDANCE PLACEMENT RAD THERAPY FIELDS: CPT | Mod: 26,,, | Performed by: RADIOLOGY

## 2024-05-28 PROCEDURE — 77014 PR  CT GUIDANCE PLACEMENT RAD THERAPY FIELDS: CPT | Mod: 26,,, | Performed by: RADIOLOGY

## 2024-05-28 PROCEDURE — 77385 HC IMRT, SIMPLE: CPT | Performed by: RADIOLOGY

## 2024-05-29 ENCOUNTER — DOCUMENTATION ONLY (OUTPATIENT)
Dept: RADIATION ONCOLOGY | Facility: CLINIC | Age: 69
End: 2024-05-29
Payer: MEDICARE

## 2024-05-29 ENCOUNTER — OFFICE VISIT (OUTPATIENT)
Dept: UROLOGY | Facility: CLINIC | Age: 69
End: 2024-05-29
Payer: MEDICARE

## 2024-05-29 VITALS
HEIGHT: 73 IN | BODY MASS INDEX: 29.57 KG/M2 | HEART RATE: 71 BPM | SYSTOLIC BLOOD PRESSURE: 111 MMHG | WEIGHT: 223.13 LBS | DIASTOLIC BLOOD PRESSURE: 66 MMHG

## 2024-05-29 DIAGNOSIS — C61 PROSTATE CANCER: Primary | ICD-10-CM

## 2024-05-29 DIAGNOSIS — R35.0 URINARY FREQUENCY: ICD-10-CM

## 2024-05-29 PROCEDURE — 96402 CHEMO HORMON ANTINEOPL SQ/IM: CPT | Mod: PBBFAC,PO

## 2024-05-29 PROCEDURE — 99214 OFFICE O/P EST MOD 30 MIN: CPT | Mod: S$PBB,,, | Performed by: NURSE PRACTITIONER

## 2024-05-29 PROCEDURE — 99999 PR PBB SHADOW E&M-EST. PATIENT-LVL III: CPT | Mod: PBBFAC,,, | Performed by: NURSE PRACTITIONER

## 2024-05-29 PROCEDURE — 77336 RADIATION PHYSICS CONSULT: CPT | Performed by: RADIOLOGY

## 2024-05-29 PROCEDURE — 99999PBSHW PR PBB SHADOW TECHNICAL ONLY FILED TO HB: Mod: JZ,PBBFAC,,

## 2024-05-29 PROCEDURE — 99213 OFFICE O/P EST LOW 20 MIN: CPT | Mod: PBBFAC,25,PO | Performed by: NURSE PRACTITIONER

## 2024-05-29 PROCEDURE — 77014 PR  CT GUIDANCE PLACEMENT RAD THERAPY FIELDS: CPT | Mod: 26,,, | Performed by: RADIOLOGY

## 2024-05-29 PROCEDURE — 77385 HC IMRT, SIMPLE: CPT | Performed by: RADIOLOGY

## 2024-05-29 RX ADMIN — TRIPTORELIN PAMOATE 11.25 MG: KIT at 11:05

## 2024-05-29 NOTE — PROGRESS NOTES
Subjective:       Patient ID: Paddy Nair is a 69 y.o. male.    Chief Complaint: Follow-up (Trelstar)    Patient is new to me. He is a 69 yo WM who is here today for his 3 month follow-up for his trelstar injection. He has a hx of prostate cancer. Currently receiving prostate radiation by Dr. Zaragoza. He should be completed with radiation treatment on 7/2/2024. He is currently taking bicalutamide 50 mg daily for his prostate cancer as well. His most recent PSA was 0.01 ng/mL and testosterone 0.6 pg/mL on 5/22/2024. Results reviewed with patient.     Follow-up  This is a chronic (prostate cancer) problem. Episode onset: 2019. The problem occurs daily. The problem has been gradually improving. Pertinent negatives include no abdominal pain, anorexia, change in bowel habit, chills, fatigue, fever, headaches, nausea, swollen glands, urinary symptoms, vomiting or weakness. Nothing aggravates the symptoms.     Review of Systems   Constitutional:  Negative for chills, fatigue and fever.   Gastrointestinal:  Negative for abdominal pain, anorexia, change in bowel habit, nausea and vomiting.   Neurological:  Negative for weakness and headaches.         Objective:      Physical Exam  Vitals and nursing note reviewed.   Constitutional:       General: He is not in acute distress.     Appearance: He is well-developed. He is not ill-appearing.   HENT:      Head: Normocephalic and atraumatic.   Eyes:      Pupils: Pupils are equal, round, and reactive to light.   Cardiovascular:      Rate and Rhythm: Normal rate.   Pulmonary:      Effort: Pulmonary effort is normal. No respiratory distress.   Abdominal:      Palpations: Abdomen is soft.      Tenderness: There is no abdominal tenderness.   Musculoskeletal:         General: Normal range of motion.      Cervical back: Normal range of motion.   Skin:     General: Skin is warm and dry.   Neurological:      Mental Status: He is alert and oriented to person, place, and time.       Coordination: Coordination normal.   Psychiatric:         Mood and Affect: Mood normal.         Behavior: Behavior normal.         Thought Content: Thought content normal.         Judgment: Judgment normal.         Assessment:       Problem List Items Addressed This Visit          Renal/    Urinary frequency       Oncology    Prostate cancer - Primary    Relevant Orders    PSA, Total and Free (Completed)    Testosterone (Completed)    Prior authorization Order       Plan:           Paddy was seen today for follow-up.    Diagnoses and all orders for this visit:    Prostate cancer  -     PSA, Total and Free; Future  -     Testosterone; Future  -     triptorelin pamoate SusR 11.25 mg  -     Prior authorization Order    Urinary frequency    Other orders  Administer trelstar injection today.   Continue taking bicalutamide (Casodex) 50 mg daily for prostate cancer.     Follow-up in 3 months with PSA and testosterone labs prior.     Charlie Mccrary, DNP

## 2024-05-29 NOTE — PATIENT INSTRUCTIONS
Administer trelstar injection today.   Continue taking bicalutamide (Casodex) 50 mg daily for prostate cancer.   Follow-up in 3 months with PSA and testosterone labs prior.

## 2024-05-29 NOTE — PROGRESS NOTES
Patient given Trelstar 11.25 mg in left upper outer quad of left gluteus muscle. He waited in the clinic for 15 min for observation, no adverse s/s were reported or observed. He departed the clinic with his AVS.

## 2024-05-29 NOTE — PLAN OF CARE
Day 15 of outpatient radiation to the  prostate bed. Doing well. No dysuria or hematuria. Nocturia x 2. Still with soft stools.

## 2024-05-30 PROCEDURE — 77014 PR  CT GUIDANCE PLACEMENT RAD THERAPY FIELDS: CPT | Mod: 26,,, | Performed by: RADIOLOGY

## 2024-05-30 PROCEDURE — 77427 RADIATION TX MANAGEMENT X5: CPT | Mod: ,,, | Performed by: RADIOLOGY

## 2024-05-30 PROCEDURE — 77385 HC IMRT, SIMPLE: CPT | Performed by: RADIOLOGY

## 2024-05-31 PROCEDURE — 77385 HC IMRT, SIMPLE: CPT | Performed by: RADIOLOGY

## 2024-05-31 PROCEDURE — 77014 PR  CT GUIDANCE PLACEMENT RAD THERAPY FIELDS: CPT | Mod: 26,,, | Performed by: RADIOLOGY

## 2024-06-03 ENCOUNTER — HOSPITAL ENCOUNTER (OUTPATIENT)
Dept: RADIATION THERAPY | Facility: HOSPITAL | Age: 69
Discharge: HOME | End: 2024-06-03
Attending: RADIOLOGY
Payer: MEDICARE

## 2024-06-03 PROCEDURE — 77014 PR  CT GUIDANCE PLACEMENT RAD THERAPY FIELDS: CPT | Mod: 26,,, | Performed by: RADIOLOGY

## 2024-06-03 PROCEDURE — 77385 HC IMRT, SIMPLE: CPT | Performed by: RADIOLOGY

## 2024-06-04 PROCEDURE — 77385 HC IMRT, SIMPLE: CPT | Performed by: RADIOLOGY

## 2024-06-04 PROCEDURE — 77014 PR  CT GUIDANCE PLACEMENT RAD THERAPY FIELDS: CPT | Mod: 26,,, | Performed by: RADIOLOGY

## 2024-06-05 ENCOUNTER — DOCUMENTATION ONLY (OUTPATIENT)
Dept: RADIATION ONCOLOGY | Facility: CLINIC | Age: 69
End: 2024-06-05
Payer: MEDICARE

## 2024-06-05 PROCEDURE — 77385 HC IMRT, SIMPLE: CPT | Performed by: RADIOLOGY

## 2024-06-05 PROCEDURE — 77336 RADIATION PHYSICS CONSULT: CPT | Performed by: RADIOLOGY

## 2024-06-05 PROCEDURE — 77014 PR  CT GUIDANCE PLACEMENT RAD THERAPY FIELDS: CPT | Mod: 26,,, | Performed by: RADIOLOGY

## 2024-06-06 PROCEDURE — 77014 PR  CT GUIDANCE PLACEMENT RAD THERAPY FIELDS: CPT | Mod: 26,,, | Performed by: RADIOLOGY

## 2024-06-06 PROCEDURE — 77427 RADIATION TX MANAGEMENT X5: CPT | Mod: ,,, | Performed by: RADIOLOGY

## 2024-06-06 PROCEDURE — 77385 HC IMRT, SIMPLE: CPT | Performed by: RADIOLOGY

## 2024-06-06 NOTE — PLAN OF CARE
Day 20 of outpatient radiation to the prostate bed. Overall doing well. No dysuria or hematuria. Intermittent episodes of diarrhea.

## 2024-06-07 PROCEDURE — 77385 HC IMRT, SIMPLE: CPT | Performed by: RADIOLOGY

## 2024-06-07 PROCEDURE — 77014 PR  CT GUIDANCE PLACEMENT RAD THERAPY FIELDS: CPT | Mod: 26,,, | Performed by: RADIOLOGY

## 2024-06-08 ENCOUNTER — PATIENT MESSAGE (OUTPATIENT)
Dept: PRIMARY CARE CLINIC | Facility: CLINIC | Age: 69
End: 2024-06-08
Payer: MEDICARE

## 2024-06-08 DIAGNOSIS — R73.02 IMPAIRED GLUCOSE TOLERANCE: Primary | ICD-10-CM

## 2024-06-08 DIAGNOSIS — E78.6 LOW HDL (UNDER 40): ICD-10-CM

## 2024-06-08 DIAGNOSIS — R53.83 FATIGUE, UNSPECIFIED TYPE: ICD-10-CM

## 2024-06-10 PROCEDURE — 77385 HC IMRT, SIMPLE: CPT | Performed by: RADIOLOGY

## 2024-06-10 PROCEDURE — 77014 PR  CT GUIDANCE PLACEMENT RAD THERAPY FIELDS: CPT | Mod: 26,,, | Performed by: RADIOLOGY

## 2024-06-11 PROCEDURE — 77014 PR  CT GUIDANCE PLACEMENT RAD THERAPY FIELDS: CPT | Mod: 26,,, | Performed by: RADIOLOGY

## 2024-06-11 PROCEDURE — 77385 HC IMRT, SIMPLE: CPT | Performed by: RADIOLOGY

## 2024-06-12 ENCOUNTER — DOCUMENTATION ONLY (OUTPATIENT)
Dept: RADIATION ONCOLOGY | Facility: CLINIC | Age: 69
End: 2024-06-12
Payer: MEDICARE

## 2024-06-12 PROCEDURE — 77300 RADIATION THERAPY DOSE PLAN: CPT | Mod: 26,,, | Performed by: RADIOLOGY

## 2024-06-12 PROCEDURE — 77338 DESIGN MLC DEVICE FOR IMRT: CPT | Mod: 26,,, | Performed by: RADIOLOGY

## 2024-06-12 PROCEDURE — 77300 RADIATION THERAPY DOSE PLAN: CPT | Mod: TC | Performed by: RADIOLOGY

## 2024-06-12 PROCEDURE — 77338 DESIGN MLC DEVICE FOR IMRT: CPT | Mod: TC | Performed by: RADIOLOGY

## 2024-06-12 PROCEDURE — 77385 HC IMRT, SIMPLE: CPT | Performed by: RADIOLOGY

## 2024-06-12 PROCEDURE — 77014 PR  CT GUIDANCE PLACEMENT RAD THERAPY FIELDS: CPT | Mod: 26,,, | Performed by: RADIOLOGY

## 2024-06-12 NOTE — PLAN OF CARE
Day 25 of outpatient radiation to the prostate bed. Tolerating treatment well. Denies dysuria, hematuria. Has experienced changes in bowels from the start of radiation, but nothing that requires Imodium AD. Nocturia x's 2, however, this is not new.

## 2024-06-13 PROCEDURE — 77385 HC IMRT, SIMPLE: CPT | Performed by: RADIOLOGY

## 2024-06-13 PROCEDURE — 77336 RADIATION PHYSICS CONSULT: CPT | Performed by: RADIOLOGY

## 2024-06-13 PROCEDURE — 77014 PR  CT GUIDANCE PLACEMENT RAD THERAPY FIELDS: CPT | Mod: 26,,, | Performed by: RADIOLOGY

## 2024-06-14 PROCEDURE — 77014 PR  CT GUIDANCE PLACEMENT RAD THERAPY FIELDS: CPT | Mod: 26,,, | Performed by: RADIOLOGY

## 2024-06-14 PROCEDURE — 77385 HC IMRT, SIMPLE: CPT | Performed by: RADIOLOGY

## 2024-06-17 PROCEDURE — 77014 PR  CT GUIDANCE PLACEMENT RAD THERAPY FIELDS: CPT | Mod: 26,,, | Performed by: RADIOLOGY

## 2024-06-17 PROCEDURE — 77385 HC IMRT, SIMPLE: CPT | Performed by: RADIOLOGY

## 2024-06-18 ENCOUNTER — OFFICE VISIT (OUTPATIENT)
Dept: PRIMARY CARE CLINIC | Facility: CLINIC | Age: 69
End: 2024-06-18
Payer: MEDICARE

## 2024-06-18 VITALS
HEIGHT: 73 IN | SYSTOLIC BLOOD PRESSURE: 118 MMHG | DIASTOLIC BLOOD PRESSURE: 82 MMHG | BODY MASS INDEX: 29.54 KG/M2 | HEART RATE: 66 BPM | OXYGEN SATURATION: 97 % | TEMPERATURE: 97 F | RESPIRATION RATE: 18 BRPM | WEIGHT: 222.88 LBS

## 2024-06-18 DIAGNOSIS — C61 PROSTATE CANCER: ICD-10-CM

## 2024-06-18 DIAGNOSIS — N52.03 COMBINED ARTERIAL INSUFFICIENCY AND CORPORO-VENOUS OCCLUSIVE ERECTILE DYSFUNCTION: ICD-10-CM

## 2024-06-18 DIAGNOSIS — Z23 NEED FOR PNEUMOCOCCAL VACCINATION: ICD-10-CM

## 2024-06-18 DIAGNOSIS — R73.02 IMPAIRED GLUCOSE TOLERANCE: Primary | ICD-10-CM

## 2024-06-18 DIAGNOSIS — G47.33 OSA (OBSTRUCTIVE SLEEP APNEA): ICD-10-CM

## 2024-06-18 PROCEDURE — G0009 ADMIN PNEUMOCOCCAL VACCINE: HCPCS | Mod: PBBFAC

## 2024-06-18 PROCEDURE — 90677 PCV20 VACCINE IM: CPT | Mod: PBBFAC

## 2024-06-18 PROCEDURE — 99213 OFFICE O/P EST LOW 20 MIN: CPT | Mod: PBBFAC | Performed by: INTERNAL MEDICINE

## 2024-06-18 PROCEDURE — 99999 PR PBB SHADOW E&M-EST. PATIENT-LVL III: CPT | Mod: PBBFAC,,, | Performed by: INTERNAL MEDICINE

## 2024-06-18 PROCEDURE — 99999PBSHW PR PBB SHADOW TECHNICAL ONLY FILED TO HB: Mod: PBBFAC,,,

## 2024-06-18 RX ADMIN — PNEUMOCOCCAL 20-VALENT CONJUGATE VACCINE 0.5 ML
2.2; 2.2; 2.2; 2.2; 2.2; 2.2; 2.2; 2.2; 2.2; 2.2; 2.2; 2.2; 2.2; 2.2; 2.2; 2.2; 4.4; 2.2; 2.2; 2.2 INJECTION, SUSPENSION INTRAMUSCULAR at 11:06

## 2024-06-18 NOTE — PROGRESS NOTES
[facility] Note    Chief Complaint      Chief Complaint   Patient presents with    Follow-up       History of Present Illness      Paddy Nair is a 68 y.o. male who presents today for   Chief Complaint   Patient presents with    Follow-up   .  Patient comes to appointment here for yearly checkup for chronic issues as below ,. Main issue current is with his prostate cancer reemergence ,. Is seeing dr trevino and getting radiation  therapy . He ahd full labs done all reviewed with patient as addressed .    HPI    No problem-specific Assessment & Plan notes found for this encounter.       Problem List Items Addressed This Visit          Renal/    Vasculogenic erectile dysfunction    Overview     Cont cialis             Oncology    Prostate cancer    Overview     Cont per dr carrillo in treatment with radiation and casodex             Endocrine    Impaired glucose tolerance - Primary    Overview     Cont current regimen   Low carb diet   a1c 6.3             Other    GUILLERMO (obstructive sleep apnea)    Overview     Is utilizing cpap nightly with great benefit . Using all night everynight          Other Visit Diagnoses       Need for pneumococcal vaccination                 Past Medical History:  Past Medical History:   Diagnosis Date    Elevated PSA, less than 10 ng/ml 08/21/2018    Prostate cancer     Urinary tract infection        Past Surgical History:  Past Surgical History:   Procedure Laterality Date    EXCISION OF LESION OF SCROTUM Right 09/23/2019    Procedure: EXCISION, LESION, SCROTUM;  Surgeon: Gagandeep Carrillo MD;  Location: [facility] OR;  Service: Urology;  Laterality: Right;    PROSTATE SURGERY  9/21/19    RADICAL RETROPUBIC PROSTATECTOMY N/A 09/23/2019    Procedure: PROSTATECTOMY, RETROPUBIC, RADICAL,;  Surgeon: Gagandeep Carrillo MD;  Location: [facility] OR;  Service: Urology;  Laterality: N/A;    TONSILLECTOMY         Family History:  family history includes Cancer in his brother,  brother, and father; Diabetes in his maternal aunt and maternal grandmother; No Known Problems in his mother.     Social History:  Social History     Socioeconomic History    Marital status:    Tobacco Use    Smoking status: Former     Current packs/day: 0.00     Types: Cigarettes     Start date: 1975     Quit date: 2017     Years since quittin.5    Smokeless tobacco: Never   Substance and Sexual Activity    Alcohol use: Yes     Alcohol/week: 1.0 - 2.0 standard drink of alcohol     Types: 1 - 2 Cans of beer per week     Comment: Maybe 2 beers a month    Drug use: No    Sexual activity: Yes     Partners: Female     Birth control/protection: None     Social Determinants of Health     Financial Resource Strain: Low Risk  (2024)    Overall Financial Resource Strain (CARDIA)     Difficulty of Paying Living Expenses: Not hard at all   Food Insecurity: No Food Insecurity (2024)    Hunger Vital Sign     Worried About Running Out of Food in the Last Year: Never true     Ran Out of Food in the Last Year: Never true   Transportation Needs: No Transportation Needs (2024)    PRAPARE - Transportation     Lack of Transportation (Medical): No     Lack of Transportation (Non-Medical): No   Physical Activity: Unknown (2024)    Exercise Vital Sign     Days of Exercise per Week: 0 days   Stress: No Stress Concern Present (2024)    [employer] - Occupational Stress Questionnaire     Feeling of Stress : Not at all   Housing Stability: Low Risk  (2024)    Housing Stability Vital Sign     Unable to Pay for Housing in the Last Year: No     Number of Places Lived in the Last Year: 1     Unstable Housing in the Last Year: No       Review of Systems:   Review of Systems   Constitutional:  Negative for fever and weight loss.   HENT:  Negative for congestion, hearing loss and sore throat.    Eyes:  Negative for blurred vision.   Respiratory:  Negative for cough and  "shortness of breath.    Cardiovascular:  Negative for chest pain, palpitations, claudication and leg swelling.   Gastrointestinal:  Negative for abdominal pain, constipation, diarrhea and heartburn.   Genitourinary:  Negative for dysuria.   Musculoskeletal:  Negative for back pain and myalgias.   Skin:  Negative for rash.   Neurological:  Negative for focal weakness and headaches.   Psychiatric/Behavioral:  Negative for depression and suicidal ideas. The patient is not nervous/anxious.         Medications:  Outpatient Encounter Medications as of 6/18/2024   Medication Sig Dispense Refill    bicalutamide (CASODEX) 50 MG Tab Take 1 tablet (50 mg total) by mouth once daily. 30 tablet 11    multivitamin capsule Take 1 capsule by mouth once daily.      tadalafiL (CIALIS) 20 MG Tab Take 1 tablet (20 mg total) by mouth daily as needed (erectile dysfunction). 30 tablet 11     Facility-Administered Encounter Medications as of 6/18/2024   Medication Dose Route Frequency Provider Last Rate Last Admin    pneumoc 20-anjelica conj-dip cr(PF) (PREVNAR-20 (PF)) injection Syrg 0.5 mL  0.5 mL Intramuscular 1 time in Clinic/HOD            Allergies:  Review of patient's allergies indicates:   Allergen Reactions    Bactrim [sulfamethoxazole-trimethoprim] Hives    Metronidazole      Abnormal heart rate/tachycardia toward end of treatment with medication possibly anaphylaxis         Physical Exam      Vitals:    06/18/24 1036   BP: 118/82   Pulse: 66   Resp: 18   Temp: 97 °F (36.1 °C)        Vital Signs  Temp: 97 °F (36.1 °C)  Temp Source: Oral  Pulse: 66  Resp: 18  SpO2: 97 %  BP: 118/82  BP Location: Left arm  Patient Position: Sitting  Pain Score: 0-No pain  Height and Weight  Height: 6' 1" (185.4 cm)  Weight: 101.1 kg (222 lb 14.2 oz)  BSA (Calculated - sq m): 2.28 sq meters  BMI (Calculated): 29.4  Weight in (lb) to have BMI = 25: 189.1]     Body mass index is 29.41 kg/m².    Physical Exam  Constitutional:       Appearance: He is " "well-developed.   HENT:      Head: Normocephalic.   Eyes:      Pupils: Pupils are equal, round, and reactive to light.   Neck:      Thyroid: No thyromegaly.   Cardiovascular:      Rate and Rhythm: Normal rate and regular rhythm.      Heart sounds: No murmur heard.     No friction rub. No gallop.   Pulmonary:      Effort: Pulmonary effort is normal.      Breath sounds: Normal breath sounds.   Abdominal:      General: Bowel sounds are normal.      Palpations: Abdomen is soft.   Musculoskeletal:         General: Normal range of motion.      Cervical back: Normal range of motion.   Skin:     General: Skin is warm and dry.   Neurological:      Mental Status: He is alert and oriented to person, place, and time.      Sensory: No sensory deficit.   Psychiatric:         Behavior: Behavior normal.          Laboratory:  CBC:  Recent Labs   Lab Result Units 06/12/24  0921   WBC K/uL 5.63   RBC M/uL 3.95*   Hemoglobin g/dL 12.5*   Hematocrit % 37.3*   Platelets K/uL 201   MCV fL 94   MCH pg 31.6*   MCHC g/dL 33.5     CMP:  Recent Labs   Lab Result Units 06/12/24  0921   Glucose mg/dL 144*   Calcium mg/dL 9.5   Albumin g/dL 3.7   Total Protein g/dL 7.2   Sodium mmol/L 138   Potassium mmol/L 4.6   CO2 mmol/L 22*   Chloride mmol/L 107   BUN mg/dL 10   Alkaline Phosphatase U/L 61   ALT U/L 17   AST U/L 17   Total Bilirubin mg/dL 0.5     URINALYSIS:  No results for input(s): "COLORU", "CLARITYU", "SPECGRAV", "PHUR", "PROTEINUA", "GLUCOSEU", "BILIRUBINCON", "BLOODU", "WBCU", "RBCU", "BACTERIA", "MUCUS", "NITRITE", "LEUKOCYTESUR", "UROBILINOGEN", "HYALINECASTS" in the last 2160 hours.   LIPIDS:  Recent Labs   Lab Result Units 06/12/24  0921   TSH uIU/mL 2.051   HDL mg/dL 42   Cholesterol mg/dL 195   Triglycerides mg/dL 182*   LDL Cholesterol mg/dL 116.6   HDL/Cholesterol Ratio % 21.5   Non-HDL Cholesterol mg/dL 153   Total Cholesterol/HDL Ratio  4.6     TSH:  Recent Labs   Lab Result Units 06/12/24  0921   TSH uIU/mL 2.051 "     A1C:  Recent Labs   Lab Result Units 06/12/24  0921   Hemoglobin A1C % 6.3*       Radiology:        Assessment:     Paddy Nair is a 68 y.o.male with:    Impaired glucose tolerance    GUILLERMO (obstructive sleep apnea)    Prostate cancer    Combined arterial insufficiency and corporo-venous occlusive erectile dysfunction    Need for pneumococcal vaccination  -     pneumoc 20-anjelica conj-dip cr(PF) (PREVNAR-20 (PF)) injection Syrg 0.5 mL                Plan:     Problem List Items Addressed This Visit          Renal/    Vasculogenic erectile dysfunction    Overview     Cont cialis             Oncology    Prostate cancer    Overview     Cont per dr shelley in treatment with radiation and casodex             Endocrine    Impaired glucose tolerance - Primary    Overview     Cont current regimen   Low carb diet   a1c 6.3             Other    GUILLERMO (obstructive sleep apnea)    Overview     Is utilizing cpap nightly with great benefit . Using all night everynight          Other Visit Diagnoses       Need for pneumococcal vaccination                As above, continue current medications and maintain follow up with specialists.  Return to clinic in 6 months.      Frederick W Dantagnan [facility]

## 2024-06-19 ENCOUNTER — OFFICE VISIT (OUTPATIENT)
Dept: URGENT CARE | Facility: CLINIC | Age: 69
End: 2024-06-19
Payer: MEDICARE

## 2024-06-19 ENCOUNTER — DOCUMENTATION ONLY (OUTPATIENT)
Dept: RADIATION ONCOLOGY | Facility: CLINIC | Age: 69
End: 2024-06-19
Payer: MEDICARE

## 2024-06-19 VITALS
HEART RATE: 72 BPM | WEIGHT: 222 LBS | HEIGHT: 73 IN | RESPIRATION RATE: 18 BRPM | OXYGEN SATURATION: 98 % | TEMPERATURE: 98 F | BODY MASS INDEX: 29.42 KG/M2 | DIASTOLIC BLOOD PRESSURE: 82 MMHG | SYSTOLIC BLOOD PRESSURE: 150 MMHG

## 2024-06-19 DIAGNOSIS — S01.81XA LACERATION OF FOREHEAD, INITIAL ENCOUNTER: Primary | ICD-10-CM

## 2024-06-19 DIAGNOSIS — S80.212A ABRASION OF LEFT KNEE, INITIAL ENCOUNTER: ICD-10-CM

## 2024-06-19 DIAGNOSIS — S00.81XA ABRASION OF FACE, INITIAL ENCOUNTER: ICD-10-CM

## 2024-06-19 PROCEDURE — 99213 OFFICE O/P EST LOW 20 MIN: CPT | Mod: S$GLB,,, | Performed by: PHYSICIAN ASSISTANT

## 2024-06-19 NOTE — PATIENT INSTRUCTIONS
KEEP THE LACERATION THAT IS STERI-STRIPS DRY FOR THE NEXT 7 DAYS    DO NOT REMOVE OR PULL OFF STERI-STRIPS THEY WILL SLOWLY FALL OFF ON THEIR OWN..  PULLING THEM OFF WE WILL RISK OPENING THE LACERATION/CUT    KEEP AQUAPHOR OR VASELINE ON ALL ABRASIONS UNTIL ABRASIONS OR COMPLETELY HEALED    RETURN HERE FOR ANY ISSUES OR CONCERNS OR ANY THANK YOU NEED    WE DISCUSSED HEAD INJURY; IF YOU BECOME NAUSEATED HEADACHE NECK PAIN OR VOMITING OR SLEEPING MORE THAN NORMAL, GO TO THE EMERGENCY DEPARTMENT

## 2024-06-19 NOTE — PROGRESS NOTES
"Follow up if symptoms worsen or fail to improve, for F/U with PCP or ED.   Patient Instructions   KEEP THE LACERATION THAT IS STERI-STRIPS DRY FOR THE NEXT 7 DAYS    DO NOT REMOVE OR PULL OFF STERI-STRIPS THEY WILL SLOWLY FALL OFF ON THEIR OWN..  PULLING THEM OFF WE WILL RISK OPENING THE LACERATION/CUT    KEEP AQUAPHOR OR VASELINE ON ALL ABRASIONS UNTIL ABRASIONS OR COMPLETELY HEALED    RETURN HERE FOR ANY ISSUES OR CONCERNS OR ANY THANK YOU NEED    WE DISCUSSED HEAD INJURY; IF YOU BECOME NAUSEATED HEADACHE NECK PAIN OR VOMITING OR SLEEPING MORE THAN NORMAL, GO TO THE EMERGENCY DEPARTMENT     Subjective:      Patient ID: Paddy Nair is a 68 y.o. male.    Vitals:  height is 6' 1" (1.854 m) and weight is 100.7 kg (222 lb). His oral temperature is 98.2 °F (36.8 °C). His blood pressure is 150/82 (abnormal) and his pulse is 72. His respiration is 18 and oxygen saturation is 98%.     Chief Complaint: Facial Laceration    Pt complains of right eyebrow laceration,/ forehead laceration that happened just prior to arrival.  Patient states he was walking and tripped and fell hitting his head on the concrete.  He also complains of an abrasion of the left knee.  There is also abrasion of the right zygomatic area.  No loss of consciousness.  No headache.  No neck pain.  No back pain.  No nausea vomiting    Facial Injury   The incident occurred less than 1 hour ago. The injury mechanism was a fall. There was no loss of consciousness. The volume of blood lost was minimal. The quality of the pain is described as aching. The pain is at a severity of 5/10. The pain is moderate. The pain has been constant since the injury. Pertinent negatives include no blurred vision, disorientation, headaches, memory loss, numbness, tinnitus, vomiting or weakness. He has tried nothing for the symptoms. The treatment provided no relief.       HENT:  Positive for facial trauma. Negative for tinnitus.    Eyes:  Negative for blurred vision. "   Gastrointestinal:  Negative for vomiting.   Skin:  Positive for abrasion and laceration.   Neurological:  Negative for headaches, disorientation and numbness.   Psychiatric/Behavioral:  Negative for disorientation.       Objective:     Physical Exam   Constitutional: He is oriented to person, place, and time. He appears well-developed.  Non-toxic appearance. He does not appear ill. No distress.   HENT:   Head: Normocephalic and atraumatic.   Ears:   Right Ear: External ear normal.   Left Ear: External ear normal.   Nose: Nose normal.   Mouth/Throat: Oropharynx is clear and moist.   Eyes: Conjunctivae, EOM and lids are normal. Pupils are equal, round, and reactive to light. Right eye exhibits no discharge. Left eye exhibits no discharge. No scleral icterus.          Comments: 1.25 cm laceration above the eyebrow  Below the in with no laceration.  There is no zygomatic or bony tenderness palpation.  There is no step-offs.  No crepitus.  The pupils equal round reactive to light in all with the extra muscles are intact with no restriction   Neck: Trachea normal and phonation normal. Neck supple.   Cardiovascular: Normal rate and regular rhythm.   Abdominal: There is no abdominal tenderness.   Musculoskeletal: Normal range of motion.         General: Normal range of motion.   Neurological: no focal deficit. He is alert and oriented to person, place, and time. He displays no weakness. Coordination and gait normal.      Comments: CRANIAL NERVES 2-12 ARE FULLY INTACT   Skin: Skin is warm, dry and intact. Capillary refill takes less than 2 seconds.         Comments: THERE IS A SMALL ABRASION 2 CM X 3-1/2 CM OVER THE LEFT PATELLA.  THAT ABRASIONS SUPERFICIAL THERE WAS NO FOREIGN BODIES.  PATELLA IS MOSTLY NONTENDER AND RANGE OF MOTION IS FULLY INTACT.  PATIENT AMBULATES WITHOUT ANY DIFFICULTY   Psychiatric: His speech is normal and behavior is normal. Judgment and thought content normal.   Nursing note and vitals  reviewed.  Laceration Repair    Date/Time: 6/19/2024 6:28 PM    Performed by: Quinn Torres PA  Authorized by: Quinn Torres PA  Body area: head/neck  Location details: forehead  Laceration length: 1.3 cm  Foreign bodies: no foreign bodies  Tendon involvement: none    Anesthesia:  Anesthetic total: 0 mL  Preparation: Patient was prepped and draped in the usual sterile fashion.  Irrigation solution: saline  Amount of cleaning: standard  Debridement: none  Degree of undermining: none  Skin closure: Steri-Strips (3)  Approximation: close  Approximation difficulty: simple  Dressing: Steri-Strips  Patient tolerance: Patient tolerated the procedure well with no immediate complications      I OFFERED PATIENT PAIN MEDICATION HE DECLINED      Assessment:     1. Laceration of forehead, initial encounter    2. Abrasion of left knee, initial encounter    3. Abrasion of face, initial encounter        Plan:       Laceration of forehead, initial encounter  -     Laceration Repair    Abrasion of left knee, initial encounter    Abrasion of face, initial encounter      Follow up if symptoms worsen or fail to improve, for F/U with PCP or ED.   Patient Instructions   KEEP THE LACERATION THAT IS STERI-STRIPS DRY FOR THE NEXT 7 DAYS    DO NOT REMOVE OR PULL OFF STERI-STRIPS THEY WILL SLOWLY FALL OFF ON THEIR OWN..  PULLING THEM OFF WE WILL RISK OPENING THE LACERATION/CUT    KEEP AQUAPHOR OR VASELINE ON ALL ABRASIONS UNTIL ABRASIONS OR COMPLETELY HEALED    RETURN HERE FOR ANY ISSUES OR CONCERNS OR ANY THANK YOU NEED    WE DISCUSSED HEAD INJURY; IF YOU BECOME NAUSEATED HEADACHE NECK PAIN OR VOMITING OR SLEEPING MORE THAN NORMAL, GO TO THE EMERGENCY DEPARTMENT

## 2024-06-20 NOTE — PLAN OF CARE
Day 30 of outpatient radiation to the prostate bed. Doing well. No dysuria or hematuria. Nocturia x 2. Intermittent episodes of watery stools; not enough to start Imodium, per client.

## 2024-06-26 ENCOUNTER — DOCUMENTATION ONLY (OUTPATIENT)
Dept: RADIATION ONCOLOGY | Facility: CLINIC | Age: 69
End: 2024-06-26
Payer: MEDICARE

## 2024-06-26 NOTE — PLAN OF CARE
Day 35 of outpatient radiation to the prostate bed. Overall doing well. No dysuria or hematuria. Intermittent episodes of diarrhea.

## 2024-07-01 ENCOUNTER — HOSPITAL ENCOUNTER (OUTPATIENT)
Dept: RADIATION THERAPY | Facility: HOSPITAL | Age: 69
Discharge: HOME | End: 2024-07-01
Attending: RADIOLOGY
Payer: MEDICARE

## 2024-07-12 ENCOUNTER — DOCUMENTATION ONLY (OUTPATIENT)
Dept: RADIATION ONCOLOGY | Facility: CLINIC | Age: 69
End: 2024-07-12
Payer: MEDICARE

## 2024-07-12 NOTE — PLAN OF CARE
Completed 39 of 39 outpatient radiation to the prostate bed on July 2nd. Tolerated therapy well. RTC in 2 mos.

## 2024-08-26 ENCOUNTER — PATIENT MESSAGE (OUTPATIENT)
Dept: PRIMARY CARE CLINIC | Facility: CLINIC | Age: 69
End: 2024-08-26
Payer: MEDICARE

## 2024-08-28 ENCOUNTER — TELEPHONE (OUTPATIENT)
Dept: PRIMARY CARE CLINIC | Facility: CLINIC | Age: 69
End: 2024-08-28
Payer: MEDICARE

## 2024-09-04 ENCOUNTER — OFFICE VISIT (OUTPATIENT)
Dept: UROLOGY | Facility: CLINIC | Age: 69
End: 2024-09-04
Payer: MEDICARE

## 2024-09-04 VITALS
HEIGHT: 73 IN | DIASTOLIC BLOOD PRESSURE: 79 MMHG | BODY MASS INDEX: 29.22 KG/M2 | SYSTOLIC BLOOD PRESSURE: 117 MMHG | HEART RATE: 71 BPM | WEIGHT: 220.44 LBS

## 2024-09-04 DIAGNOSIS — C61 PROSTATE CANCER: ICD-10-CM

## 2024-09-04 DIAGNOSIS — Z92.3 STATUS POST RADIATION THERAPY: ICD-10-CM

## 2024-09-04 DIAGNOSIS — Z85.46 HISTORY OF PROSTATE CANCER: Primary | ICD-10-CM

## 2024-09-04 PROCEDURE — 99213 OFFICE O/P EST LOW 20 MIN: CPT | Mod: PBBFAC,PO | Performed by: UROLOGY

## 2024-09-04 PROCEDURE — 99215 OFFICE O/P EST HI 40 MIN: CPT | Mod: S$PBB,,, | Performed by: UROLOGY

## 2024-09-04 PROCEDURE — 99999 PR PBB SHADOW E&M-EST. PATIENT-LVL III: CPT | Mod: PBBFAC,,, | Performed by: UROLOGY

## 2024-09-04 NOTE — PROGRESS NOTES
Subjective:      Patient ID: Paddy Nair is a 69 y.o. male.    Chief Complaint: prostate  cancer - 3 month     Mr. Jimenez is a 69-year-old gentleman with a history of radical retropubic prostatectomy.  The patient's PSA never did not juliet even though pathology report revealed that there was no extension of cancer outside the prostate and all nodes were negative.  The patient eventually underwent PSMA PET scan which revealed area in the prostate bed which was suspicious.  The patient was treated with radiation and has completed that course of therapy.  He was also on androgen deprivation therapy over the last 6 months.  The patient has had no complaints with therapy and is only complaining about hot flashes due to the hormonal blockade.  Patient is looking for to stopping this medicine.  PSA is less than 0.01 and testosterone is up to 29.  Will repeat labs every 3 months for the next 2 years and then every 6 months thereafter.    Follow-up  Chronicity: Hx of CAP. The problem occurs constantly. Pertinent negatives include no abdominal pain, anorexia, arthralgias, change in bowel habit, chest pain, chills, congestion, coughing, diaphoresis, fatigue, fever, headaches, joint swelling, myalgias, nausea, neck pain, numbness, rash, sore throat, swollen glands, urinary symptoms, vertigo, visual change, vomiting or weakness. Nothing aggravates the symptoms. He has tried nothing for the symptoms. The treatment provided significant relief.     Review of Systems   Constitutional:  Negative for activity change, appetite change, chills, diaphoresis, fatigue, fever and unexpected weight change.   HENT:  Negative for congestion, hearing loss, sinus pressure, sore throat and trouble swallowing.    Eyes:  Negative for photophobia, pain, discharge and visual disturbance.   Respiratory:  Negative for apnea, cough and shortness of breath.    Cardiovascular:  Negative for chest pain, palpitations and leg swelling.    Gastrointestinal:  Negative for abdominal distention, abdominal pain, anal bleeding, anorexia, blood in stool, change in bowel habit, constipation, diarrhea, nausea, rectal pain and vomiting.   Endocrine: Negative for cold intolerance, heat intolerance, polydipsia, polyphagia and polyuria.   Genitourinary:  Negative for decreased urine volume, difficulty urinating, dysuria, enuresis, flank pain, frequency, genital sores, hematuria, penile discharge, penile pain, penile swelling, scrotal swelling, testicular pain and urgency.   Musculoskeletal:  Negative for arthralgias, back pain, joint swelling, myalgias and neck pain.   Skin:  Negative for color change, pallor, rash and wound.   Allergic/Immunologic: Negative for environmental allergies, food allergies and immunocompromised state.   Neurological:  Negative for dizziness, vertigo, seizures, weakness, numbness and headaches.   Hematological:  Negative for adenopathy. Does not bruise/bleed easily.   Psychiatric/Behavioral: Negative.        Objective:     Physical Exam  Vitals and nursing note reviewed.   Constitutional:       Appearance: Normal appearance. He is well-developed.   HENT:      Head: Normocephalic and atraumatic.      Right Ear: External ear normal.      Left Ear: External ear normal.      Nose: Nose normal.      Mouth/Throat:      Mouth: Mucous membranes are moist.      Pharynx: Oropharynx is clear. No oropharyngeal exudate or posterior oropharyngeal erythema.   Eyes:      General: No scleral icterus.        Right eye: No discharge.         Left eye: No discharge.      Extraocular Movements: Extraocular movements intact.      Conjunctiva/sclera: Conjunctivae normal.      Pupils: Pupils are equal, round, and reactive to light.   Cardiovascular:      Rate and Rhythm: Normal rate and regular rhythm.      Heart sounds: Normal heart sounds.   Pulmonary:      Effort: Pulmonary effort is normal.   Abdominal:      General: Bowel sounds are normal. There is no  distension.      Palpations: Abdomen is soft. There is no mass.      Tenderness: There is no abdominal tenderness. There is no right CVA tenderness, left CVA tenderness, guarding or rebound.      Hernia: No hernia is present.   Genitourinary:     Penis: Normal.       Testes: Normal.      Comments: Patient with no CVA tenderness, normal penis and scrotum.  Bladder nontender.  Musculoskeletal:         General: Normal range of motion.      Cervical back: Normal range of motion and neck supple.   Skin:     General: Skin is warm and dry.      Capillary Refill: Capillary refill takes less than 2 seconds.   Neurological:      General: No focal deficit present.      Mental Status: He is alert and oriented to person, place, and time.      Cranial Nerves: No cranial nerve deficit.      Sensory: No sensory deficit.      Motor: No weakness.      Coordination: Coordination normal.      Gait: Gait normal.      Deep Tendon Reflexes: Reflexes are normal and symmetric. Reflexes normal.   Psychiatric:         Mood and Affect: Mood normal.         Behavior: Behavior normal.         Thought Content: Thought content normal.         Judgment: Judgment normal.        Assessment:      1. History of prostate cancer    2. Prostate cancer    3. Status post radiation therapy      Plan:     Patient Instructions   Hold Trelstar injection for now.  Patient to follow up with Dr. Zaragoza.  At last visit the patient was informed that if his PSA stays down in his testosterone felipa he would not need to have another hormonal treatment.    The patient has done well and completed radiation therapy.  His PSA was low at less than 0.01 and testosterone is up to 29.  Patient will follow-up with Dr. Zaragoza in the next couple weeks and then let me know if he needs to have further injection at this point we will hold and even possibly discontinue Trelstar injection.

## 2024-09-04 NOTE — PATIENT INSTRUCTIONS
Hold Trelstar injection for now.  Patient to follow up with Dr. Zaragoza.  At last visit the patient was informed that if his PSA stays down in his testosterone felipa he would not need to have another hormonal treatment.    The patient has done well and completed radiation therapy.  His PSA was low at less than 0.01 and testosterone is up to 29.  Patient will follow-up with Dr. Zaragoza in the next couple weeks and then let me know if he needs to have further injection at this point we will hold and even possibly discontinue Trelstar injection.

## 2024-09-23 ENCOUNTER — PATIENT MESSAGE (OUTPATIENT)
Dept: RADIATION ONCOLOGY | Facility: CLINIC | Age: 69
End: 2024-09-23
Payer: MEDICARE

## 2024-10-09 ENCOUNTER — OFFICE VISIT (OUTPATIENT)
Dept: RADIATION ONCOLOGY | Facility: CLINIC | Age: 69
End: 2024-10-09
Payer: MEDICARE

## 2024-10-09 VITALS
DIASTOLIC BLOOD PRESSURE: 69 MMHG | RESPIRATION RATE: 16 BRPM | SYSTOLIC BLOOD PRESSURE: 140 MMHG | HEIGHT: 73 IN | WEIGHT: 233.25 LBS | BODY MASS INDEX: 30.91 KG/M2 | HEART RATE: 62 BPM

## 2024-10-09 DIAGNOSIS — C61 PROSTATE CANCER: Primary | ICD-10-CM

## 2024-10-09 PROCEDURE — 99213 OFFICE O/P EST LOW 20 MIN: CPT | Mod: PBBFAC | Performed by: RADIOLOGY

## 2024-10-09 PROCEDURE — 99499 UNLISTED E&M SERVICE: CPT | Mod: S$PBB,,, | Performed by: RADIOLOGY

## 2024-10-09 PROCEDURE — 99999 PR PBB SHADOW E&M-EST. PATIENT-LVL III: CPT | Mod: PBBFAC,,, | Performed by: RADIOLOGY

## 2024-10-09 NOTE — PROGRESS NOTES
[facility] - Radiation Oncology     Patient ID: Paddy Nair is a 69 y.o. male.    Chief Complaint: Prostate Cancer (F/u after xrt)      Mr. Nair has a history of pathologic Stage IIB (T2, N0, M0, GG2, PsA < 10) prostate cancer.  He presented in 2019 for evaluation of an elevated PSA of 5.4 ng/ml.  Biopsies revealed Bianca 6 (3+3) adenocarcinoma.  He underwent prostatectomy.   Pathology revealed Bianca 7 (3+4) adenocarcinoma involving 15% of the gland.  The Bianca pattern 4 accounted for 10% of the tumor.  There was no extraprostatic extension, bladder neck invasion or seminal vesicle invasion.  Five pelvic nodes were negative for tumor involvement.  Postoperative PSA returned at 0.06 ng/ml.  Although it fluctuated repeat PSA in April ov 2023 was 0.07 ng/ml.  PSA in October of 2023 increased to 0.21 ng/ml.  Repeat PSA returned at 0.26 ng/ml.  PSMA scan on 2/6/24 revealed focal increased uptake in the midline prostatectomy bed. concerning for local cancer recurrence.  There was no evidence of regional or distant metastatic disease.  He began bicalutamide and Lupron in March of 2024.  He completed radiotherapy to the prostate bed and pelvic nodes on 7/2/24.  Today the patient states he feels well.  No complaints.       Review of Systems   Constitutional:  Negative for activity change, appetite change, chills, fatigue and fever (occasional hot flash).   Gastrointestinal:  Negative for constipation, diarrhea and fecal incontinence.   Genitourinary:  Negative for bladder incontinence, difficulty urinating, dysuria, frequency, hematuria and urgency.     Physical Exam  Constitutional:       General: He is not in acute distress.     Appearance: Normal appearance.   Neurological:      Mental Status: He is alert and oriented to person, place, and time.   Psychiatric:         Mood and Affect: Mood normal.         Judgment: Judgment normal.        Latest Reference Range & Units 08/26/24  08:20   PSA Total 0.00 - 4.00 ng/mL <0.01   Testosterone, Total 304 - 1227 ng/dL 29 (L)   (L): Data is abnormally low       Assessment and Plan    Recurrent prostate cancer - recovered from the acute effects of radiotherapy.  Completed radiotherapy and 6 months of ADT.  Plan follow with us in 6 months.

## 2024-11-06 ENCOUNTER — PATIENT MESSAGE (OUTPATIENT)
Dept: ADMINISTRATIVE | Facility: HOSPITAL | Age: 69
End: 2024-11-06
Payer: MEDICARE

## 2024-12-03 ENCOUNTER — PATIENT MESSAGE (OUTPATIENT)
Dept: UROLOGY | Facility: CLINIC | Age: 69
End: 2024-12-03
Payer: MEDICARE

## 2024-12-03 DIAGNOSIS — Z85.46 HISTORY OF PROSTATE CANCER: Primary | ICD-10-CM

## 2024-12-20 ENCOUNTER — OFFICE VISIT (OUTPATIENT)
Dept: PRIMARY CARE CLINIC | Facility: CLINIC | Age: 69
End: 2024-12-20
Payer: MEDICARE

## 2024-12-20 VITALS
WEIGHT: 232.81 LBS | SYSTOLIC BLOOD PRESSURE: 118 MMHG | RESPIRATION RATE: 18 BRPM | HEART RATE: 61 BPM | OXYGEN SATURATION: 98 % | BODY MASS INDEX: 30.85 KG/M2 | DIASTOLIC BLOOD PRESSURE: 74 MMHG | TEMPERATURE: 99 F | HEIGHT: 73 IN

## 2024-12-20 DIAGNOSIS — R73.02 IMPAIRED GLUCOSE TOLERANCE: ICD-10-CM

## 2024-12-20 DIAGNOSIS — Z85.46 HISTORY OF PROSTATE CANCER: Primary | ICD-10-CM

## 2024-12-20 PROCEDURE — 99213 OFFICE O/P EST LOW 20 MIN: CPT | Mod: PBBFAC | Performed by: INTERNAL MEDICINE

## 2024-12-20 PROCEDURE — 99999 PR PBB SHADOW E&M-EST. PATIENT-LVL III: CPT | Mod: PBBFAC,,, | Performed by: INTERNAL MEDICINE

## 2024-12-20 NOTE — PROGRESS NOTES
[facility] Note    Chief Complaint      Chief Complaint   Patient presents with    Follow-up     6m       History of Present Illness      Paddy Nair is a 69 y.o. male who presents today for   Chief Complaint   Patient presents with    Follow-up     6m   .  Patient comes to appointment here for 6m checkup for chronic issues as below he is feeling great . He is seeing dr shelley for his prostate cancer . Last psa less than 0. He needsf ull screening labs at nexct visit     HPI    No problem-specific Assessment & Plan notes found for this encounter.       Problem List Items Addressed This Visit       History of prostate cancer - Primary    Overview     Cont per urology          Impaired glucose tolerance    Overview     Cont current regimen   Low carb diet   a1c 6.3 last cyheck will repeat next visit              Past Medical History:  Past Medical History:   Diagnosis Date    Elevated PSA, less than 10 ng/ml 08/21/2018    Prostate cancer     Urinary tract infection        Past Surgical History:  Past Surgical History:   Procedure Laterality Date    CATARACT EXTRACTION Bilateral     EXCISION OF LESION OF SCROTUM Right 09/23/2019    Procedure: EXCISION, LESION, SCROTUM;  Surgeon: Gagandeep Shelley MD;  Location: [facility] OR;  Service: Urology;  Laterality: Right;    PROSTATE SURGERY  9/21/19    RADICAL RETROPUBIC PROSTATECTOMY N/A 09/23/2019    Procedure: PROSTATECTOMY, RETROPUBIC, RADICAL,;  Surgeon: Gagandeep Shelley MD;  Location: [facility] OR;  Service: Urology;  Laterality: N/A;    TONSILLECTOMY         Family History:  family history includes Cancer in his brother, brother, and father; Diabetes in his maternal aunt and maternal grandmother; No Known Problems in his mother.     Social History:  Social History     Socioeconomic History    Marital status:    Tobacco Use    Smoking status: Former     Current packs/day: 0.00     Types: Cigarettes     Start date: 1/1/1975     Quit date:  2017     Years since quittin.0    Smokeless tobacco: Never   Substance and Sexual Activity    Alcohol use: Yes     Alcohol/week: 1.0 - 2.0 standard drink of alcohol     Types: 1 - 2 Cans of beer per week     Comment: Maybe 2 beers a month    Drug use: No    Sexual activity: Yes     Partners: Female     Birth control/protection: None     Social Drivers of Health     Financial Resource Strain: Low Risk  (2024)    Overall Financial Resource Strain (CARDIA)     Difficulty of Paying Living Expenses: Not hard at all   Food Insecurity: No Food Insecurity (2024)    Hunger Vital Sign     Worried About Running Out of Food in the Last Year: Never true     Ran Out of Food in the Last Year: Never true   Transportation Needs: No Transportation Needs (2024)    PRAPARE - Transportation     Lack of Transportation (Medical): No     Lack of Transportation (Non-Medical): No   Physical Activity: Unknown (2024)    Exercise Vital Sign     Days of Exercise per Week: 0 days   Stress: No Stress Concern Present (2024)    [employer] [place] [employer] - Occupational Stress Questionnaire     Feeling of Stress : Not at all   Housing Stability: Low Risk  (2024)    Housing Stability Vital Sign     Unable to Pay for Housing in the Last Year: No     Number of Places Lived in the Last Year: 1     Unstable Housing in the Last Year: No       Review of Systems:   Review of Systems   Constitutional:  Negative for fever and weight loss.   HENT:  Negative for congestion, hearing loss and sore throat.    Eyes:  Negative for blurred vision.   Respiratory:  Negative for cough and shortness of breath.    Cardiovascular:  Negative for chest pain, palpitations, claudication and leg swelling.   Gastrointestinal:  Negative for abdominal pain, constipation, diarrhea and heartburn.   Genitourinary:  Negative for dysuria.   Musculoskeletal:  Negative for back pain and myalgias.   Skin:  Negative for rash.  "  Neurological:  Negative for focal weakness and headaches.   Psychiatric/Behavioral:  Negative for depression and suicidal ideas. The patient is not nervous/anxious.         Medications:  Outpatient Encounter Medications as of 12/20/2024   Medication Sig Dispense Refill    multivitamin capsule Take 1 capsule by mouth once daily.      tadalafiL (CIALIS) 20 MG Tab Take 1 tablet (20 mg total) by mouth daily as needed (erectile dysfunction). 30 tablet 11     No facility-administered encounter medications on file as of 12/20/2024.       Allergies:  Review of patient's allergies indicates:   Allergen Reactions    Bactrim [sulfamethoxazole-trimethoprim] Hives    Metronidazole      Abnormal heart rate/tachycardia toward end of treatment with medication possibly anaphylaxis         Physical Exam      Vitals:    12/20/24 0945   BP: 118/74   Pulse: 61   Resp: 18   Temp: 98.7 °F (37.1 °C)        Vital Signs  Temp: 98.7 °F (37.1 °C)  Temp Source: Oral  Pulse: 61  Resp: 18  SpO2: 98 %  BP: 118/74  BP Location: Left arm  Patient Position: Sitting  Pain Score: 0-No pain  Height and Weight  Height: 6' 1" (185.4 cm)  Weight: 105.6 kg (232 lb 12.9 oz)  BSA (Calculated - sq m): 2.33 sq meters  BMI (Calculated): 30.7  Weight in (lb) to have BMI = 25: 189.1]     Body mass index is 30.71 kg/m².    Physical Exam  Constitutional:       Appearance: He is well-developed.   HENT:      Head: Normocephalic.   Eyes:      Pupils: Pupils are equal, round, and reactive to light.   Neck:      Thyroid: No thyromegaly.   Cardiovascular:      Rate and Rhythm: Normal rate and regular rhythm.      Heart sounds: No murmur heard.     No friction rub. No gallop.   Pulmonary:      Effort: Pulmonary effort is normal.      Breath sounds: Normal breath sounds.   Abdominal:      General: Bowel sounds are normal.      Palpations: Abdomen is soft.   Musculoskeletal:         General: Normal range of motion.      Cervical back: Normal range of motion.   Skin:     " "General: Skin is warm and dry.   Neurological:      Mental Status: He is alert and oriented to person, place, and time.      Sensory: No sensory deficit.   Psychiatric:         Behavior: Behavior normal.          Laboratory:  CBC:  No results for input(s): "WBC", "RBC", "HGB", "HCT", "PLT", "MCV", "MCH", "MCHC" in the last 2160 hours.  CMP:  No results for input(s): "GLU", "CALCIUM", "ALBUMIN", "PROT", "NA", "K", "CO2", "CL", "BUN", "ALKPHOS", "ALT", "AST", "BILITOT" in the last 2160 hours.    Invalid input(s): "CREATININ"  URINALYSIS:  No results for input(s): "COLORU", "CLARITYU", "SPECGRAV", "PHUR", "PROTEINUA", "GLUCOSEU", "BILIRUBINCON", "BLOODU", "WBCU", "RBCU", "BACTERIA", "MUCUS", "NITRITE", "LEUKOCYTESUR", "UROBILINOGEN", "HYALINECASTS" in the last 2160 hours.   LIPIDS:  No results for input(s): "TSH", "HDL", "CHOL", "TRIG", "LDLCALC", "CHOLHDL", "NONHDLCHOL", "TOTALCHOLEST" in the last 2160 hours.  TSH:  No results for input(s): "TSH" in the last 2160 hours.  A1C:  No results for input(s): "HGBA1C" in the last 2160 hours.    Radiology:        Assessment:     Paddy Nair is a 69 y.o.male with:    History of prostate cancer    Impaired glucose tolerance                Plan:     Problem List Items Addressed This Visit       History of prostate cancer - Primary    Overview     Cont per urology          Impaired glucose tolerance    Overview     Cont current regimen   Low carb diet   a1c 6.3 last cyheck will repeat next visit             As above, continue current medications and maintain follow up with specialists.  Return to clinic in 6 months.      Frederick W Dantagnan [facility]                  "

## 2024-12-23 ENCOUNTER — PATIENT MESSAGE (OUTPATIENT)
Dept: PRIMARY CARE CLINIC | Facility: CLINIC | Age: 69
End: 2024-12-23
Payer: MEDICARE

## 2025-02-21 DIAGNOSIS — Z00.00 ENCOUNTER FOR MEDICARE ANNUAL WELLNESS EXAM: ICD-10-CM

## 2025-03-05 ENCOUNTER — PATIENT MESSAGE (OUTPATIENT)
Dept: UROLOGY | Facility: CLINIC | Age: 70
End: 2025-03-05

## 2025-03-05 ENCOUNTER — OFFICE VISIT (OUTPATIENT)
Dept: UROLOGY | Facility: CLINIC | Age: 70
End: 2025-03-05
Payer: MEDICARE

## 2025-03-05 VITALS
DIASTOLIC BLOOD PRESSURE: 76 MMHG | BODY MASS INDEX: 30.53 KG/M2 | WEIGHT: 231.38 LBS | SYSTOLIC BLOOD PRESSURE: 119 MMHG | HEART RATE: 64 BPM

## 2025-03-05 DIAGNOSIS — Z80.42 FAMILY HISTORY OF PROSTATE CANCER IN FATHER: ICD-10-CM

## 2025-03-05 DIAGNOSIS — C61 PROSTATE CANCER: Primary | ICD-10-CM

## 2025-03-05 DIAGNOSIS — Z85.46 HISTORY OF PROSTATE CANCER: ICD-10-CM

## 2025-03-05 DIAGNOSIS — Z92.3 STATUS POST RADIATION THERAPY: ICD-10-CM

## 2025-03-05 DIAGNOSIS — N52.03 COMBINED ARTERIAL INSUFFICIENCY AND CORPORO-VENOUS OCCLUSIVE ERECTILE DYSFUNCTION: ICD-10-CM

## 2025-03-05 PROCEDURE — 99213 OFFICE O/P EST LOW 20 MIN: CPT | Mod: PBBFAC,PO | Performed by: UROLOGY

## 2025-03-05 PROCEDURE — 99999 PR PBB SHADOW E&M-EST. PATIENT-LVL III: CPT | Mod: PBBFAC,,, | Performed by: UROLOGY

## 2025-03-05 NOTE — PROGRESS NOTES
Subjective:      Patient ID: Paddy Nair is a 69 y.o. male.    Chief Complaint: No chief complaint on file.    Mr. Jimenez is a 69-year-old gentleman with a history of radical retropubic prostatectomy.  The patient's PSA never did not juliet even though pathology report revealed that there was no extension of cancer outside the prostate and all nodes were negative.  The patient eventually underwent PSMA PET scan which revealed area in the prostate bed which was suspicious.  The patient was treated with radiation and has completed that course of therapy.  He was also on androgen deprivation therapy over the last 6 months.  The patient has had no complaints with therapy and is only complaining about hot flashes due to the hormonal blockade.  Patient is looking for to stopping this medicine.  PSA is less than 0.01 and testosterone is up to 262.  Will repeat labs every 3 months for the next 18 months and then every 6 months thereafter.    Follow-up  This is a chronic (Recurrent/residual prostate cancer treated with radiation) problem. The current episode started more than 1 year ago. The problem occurs constantly. The problem has been resolved. Associated symptoms include urinary symptoms (nocturia x1). Pertinent negatives include no abdominal pain, anorexia, arthralgias, change in bowel habit, chest pain, chills, congestion, coughing, diaphoresis, fatigue, fever, headaches, joint swelling, myalgias, nausea, neck pain, numbness, rash, sore throat, swollen glands, vertigo, visual change, vomiting or weakness. Nothing aggravates the symptoms. He has tried nothing for the symptoms. The treatment provided significant relief.     Review of Systems   Constitutional:  Negative for activity change, appetite change, chills, diaphoresis, fatigue, fever and unexpected weight change.   HENT:  Negative for congestion, hearing loss, sinus pressure, sore throat and trouble swallowing.    Eyes:  Negative for photophobia,  pain, discharge and visual disturbance.   Respiratory:  Negative for apnea, cough and shortness of breath.    Cardiovascular:  Negative for chest pain, palpitations and leg swelling.   Gastrointestinal:  Negative for abdominal distention, abdominal pain, anal bleeding, anorexia, blood in stool, change in bowel habit, constipation, diarrhea, nausea, rectal pain and vomiting.   Endocrine: Negative for cold intolerance, heat intolerance, polydipsia, polyphagia and polyuria.   Genitourinary:  Negative for decreased urine volume, difficulty urinating, dysuria, enuresis, flank pain, frequency, genital sores, hematuria, penile discharge, penile pain, penile swelling, scrotal swelling, testicular pain and urgency.   Musculoskeletal:  Negative for arthralgias, back pain, joint swelling, myalgias and neck pain.   Skin:  Negative for color change, pallor, rash and wound.   Allergic/Immunologic: Negative for environmental allergies, food allergies and immunocompromised state.   Neurological:  Negative for dizziness, vertigo, seizures, weakness, numbness and headaches.   Hematological:  Negative for adenopathy. Does not bruise/bleed easily.   Psychiatric/Behavioral: Negative.        Objective:     Physical Exam  Constitutional:       General: He is not in acute distress.     Appearance: Normal appearance. He is obese. He is not ill-appearing, toxic-appearing or diaphoretic.   HENT:      Head: Normocephalic and atraumatic.      Right Ear: External ear normal.      Left Ear: External ear normal.      Nose: Nose normal.      Mouth/Throat:      Pharynx: No oropharyngeal exudate or posterior oropharyngeal erythema.   Eyes:      General: No scleral icterus.        Right eye: No discharge.         Left eye: No discharge.      Extraocular Movements: Extraocular movements intact.      Conjunctiva/sclera: Conjunctivae normal.      Pupils: Pupils are equal, round, and reactive to light.   Cardiovascular:      Rate and Rhythm: Normal rate  and regular rhythm.      Pulses: Normal pulses.      Heart sounds: Normal heart sounds. No murmur heard.     No friction rub. No gallop.   Pulmonary:      Effort: Pulmonary effort is normal. No respiratory distress.      Breath sounds: Normal breath sounds. No stridor. No wheezing, rhonchi or rales.   Chest:      Chest wall: No tenderness.   Abdominal:      General: Abdomen is flat. Bowel sounds are normal. There is no distension.      Palpations: There is no mass.      Tenderness: There is no abdominal tenderness. There is no right CVA tenderness, left CVA tenderness, guarding or rebound.      Hernia: No hernia is present.   Genitourinary:     Penis: Normal.       Testes: Normal.   Musculoskeletal:      Cervical back: Normal range of motion and neck supple.      Right lower leg: No edema.      Left lower leg: No edema.   Skin:     General: Skin is warm.      Capillary Refill: Capillary refill takes less than 2 seconds.   Neurological:      General: No focal deficit present.      Mental Status: He is alert and oriented to person, place, and time. Mental status is at baseline.      Cranial Nerves: No cranial nerve deficit.      Sensory: No sensory deficit.      Motor: No weakness.      Coordination: Coordination normal.      Gait: Gait normal.      Deep Tendon Reflexes: Reflexes normal.   Psychiatric:         Mood and Affect: Mood normal.         Behavior: Behavior normal.         Thought Content: Thought content normal.         Judgment: Judgment normal.        Assessment:      1. Prostate cancer    2. Status post radiation therapy    3. Combined arterial insufficiency and corporo-venous occlusive erectile dysfunction    4. History of prostate cancer    5. Family history of prostate cancer in father      Plan:     Patient Instructions   Patient to follow up in 3 months with testosterone and PSA level  Continue current management.

## 2025-04-17 ENCOUNTER — OFFICE VISIT (OUTPATIENT)
Dept: RADIATION ONCOLOGY | Facility: CLINIC | Age: 70
End: 2025-04-17
Payer: MEDICARE

## 2025-04-17 VITALS
HEART RATE: 57 BPM | SYSTOLIC BLOOD PRESSURE: 138 MMHG | WEIGHT: 229.94 LBS | HEIGHT: 73 IN | DIASTOLIC BLOOD PRESSURE: 71 MMHG | BODY MASS INDEX: 30.47 KG/M2

## 2025-04-17 DIAGNOSIS — C61 PROSTATE CANCER: Primary | ICD-10-CM

## 2025-04-17 PROCEDURE — 99999 PR PBB SHADOW E&M-EST. PATIENT-LVL III: CPT | Mod: PBBFAC,,, | Performed by: RADIOLOGY

## 2025-04-17 PROCEDURE — 99213 OFFICE O/P EST LOW 20 MIN: CPT | Mod: PBBFAC | Performed by: RADIOLOGY

## 2025-04-17 NOTE — PROGRESS NOTES
[facility] [place] - Radiation Oncology     Patient ID: Paddy Nair is a 69 y.o. male.    Chief Complaint: Follow-up      Mr. Nair has a history of pathologic Stage IIB (T2, N0, M0, GG2, PsA < 10) prostate cancer.  He presented in 2019 for evaluation of an elevated PSA of 5.4 ng/ml.  Biopsies revealed Bianca 6 (3+3) adenocarcinoma.  He underwent prostatectomy.   Pathology revealed Bianca 7 (3+4) adenocarcinoma involving 15% of the gland.  The Bianca pattern 4 accounted for 10% of the tumor.  There was no extraprostatic extension, bladder neck invasion or seminal vesicle invasion.  Five pelvic nodes were negative for tumor involvement.  Postoperative PSA returned at 0.06 ng/ml.  Although it fluctuated repeat PSA in April ov 2023 was 0.07 ng/ml.  PSA in October of 2023 increased to 0.21 ng/ml.  Repeat PSA returned at 0.26 ng/ml.  PSMA scan on 2/6/24 revealed focal increased uptake in the midline prostatectomy bed. concerning for local cancer recurrence.  There was no evidence of regional or distant metastatic disease.  He began bicalutamide and Lupron in March of 2024.  He completed radiotherapy to the prostate bed and pelvic nodes on 7/2/24.       Review of Systems   Constitutional:  Negative for activity change, appetite change, chills and fatigue.   Gastrointestinal:  Negative for constipation, diarrhea and fecal incontinence.   Genitourinary:  Negative for bladder incontinence, difficulty urinating, dysuria and frequency.     Physical Exam  Constitutional:       General: He is not in acute distress.     Appearance: Normal appearance.   Neurological:      Mental Status: He is alert and oriented to person, place, and time.   Psychiatric:         Mood and Affect: Mood normal.         Judgment: Judgment normal.        Latest Reference Range & Units 02/28/25 14:44   PSA Total 0.00 - 4.00 ng/mL <0.01   Testosterone, Total 304 - 1227 ng/dL  304 - 1227 ng/dL 262 (L)  262 (L)   (L): Data  is abnormally low     Assessment and Plan    Prostate cancer - doing well  no long term side effects Will continue PSA and testosterone per Dr. Carrillo.  Plan follow with us PRN.

## 2025-04-18 ENCOUNTER — PATIENT MESSAGE (OUTPATIENT)
Dept: HEMATOLOGY/ONCOLOGY | Facility: CLINIC | Age: 70
End: 2025-04-18
Payer: MEDICARE

## 2025-04-21 ENCOUNTER — TELEPHONE (OUTPATIENT)
Dept: HEMATOLOGY/ONCOLOGY | Facility: CLINIC | Age: 70
End: 2025-04-21
Payer: MEDICARE

## 2025-04-24 ENCOUNTER — OFFICE VISIT (OUTPATIENT)
Dept: URGENT CARE | Facility: CLINIC | Age: 70
End: 2025-04-24
Payer: MEDICARE

## 2025-04-24 VITALS
SYSTOLIC BLOOD PRESSURE: 146 MMHG | HEART RATE: 67 BPM | TEMPERATURE: 99 F | DIASTOLIC BLOOD PRESSURE: 72 MMHG | BODY MASS INDEX: 30.35 KG/M2 | OXYGEN SATURATION: 97 % | RESPIRATION RATE: 18 BRPM | HEIGHT: 73 IN | WEIGHT: 229 LBS

## 2025-04-24 DIAGNOSIS — H60.92 OTITIS EXTERNA OF LEFT EAR, UNSPECIFIED CHRONICITY, UNSPECIFIED TYPE: Primary | ICD-10-CM

## 2025-04-24 DIAGNOSIS — H61.22 HEARING LOSS OF LEFT EAR DUE TO CERUMEN IMPACTION: ICD-10-CM

## 2025-04-24 RX ORDER — OFLOXACIN 3 MG/ML
5 SOLUTION AURICULAR (OTIC) 2 TIMES DAILY
Qty: 10 ML | Refills: 0 | Status: SHIPPED | OUTPATIENT
Start: 2025-04-24 | End: 2025-04-24

## 2025-04-24 RX ORDER — OFLOXACIN 3 MG/ML
5 SOLUTION AURICULAR (OTIC) 2 TIMES DAILY
Qty: 10 ML | Refills: 0 | Status: SHIPPED | OUTPATIENT
Start: 2025-04-24

## 2025-04-24 NOTE — PROGRESS NOTES
"Subjective:      Patient ID: Paddy Nair is a 69 y.o. male.    Vitals:  height is 6' 1" (1.854 m) and weight is 103.9 kg (229 lb). His oral temperature is 98.6 °F (37 °C). His blood pressure is 146/72 (abnormal) and his pulse is 67. His respiration is 18 and oxygen saturation is 97%.     Chief Complaint: Ear Fullness    Pt complains of left ear impaction.  HE STATES THAT IT HAS BEEN MUFFLED FOR SOME PERIOD NOW BUT CAN NOT TELL ME EXACTLY WHAT.  HE WAS SEEN BY AUDIOLOGIST RECENTLY AND TOLD THAT IT WAS IMPACTED WITH CERUMEN AND WAS TOLD TO GET IT IRRIGATED SO HE IS HERE TODAY FOR LEFT EAR CERUMEN IMPACTION IRRIGATION    Ear Fullness   There is pain in the left ear. This is a new problem. The current episode started today. The problem occurs constantly. The problem has been unchanged. There has been no fever. The pain is at a severity of 0/10. The patient is experiencing no pain. Associated symptoms include hearing loss. Pertinent negatives include no abdominal pain, coughing or ear discharge. He has tried nothing for the symptoms. The treatment provided no relief.       HENT:  Positive for hearing loss. Negative for ear discharge.    Respiratory:  Negative for cough.    Gastrointestinal:  Negative for abdominal pain.      Objective:     Physical Exam   Constitutional:  Non-toxic appearance. He does not appear ill. No distress.   HENT:   Ears:   Right Ear: Tympanic membrane, external ear and ear canal normal. no impacted cerumen  Left Ear: impacted cerumen     Comments: INITIALLY LEFT EAR WAS IMPACTED WITH CERUMEN NOT ABLE TO VISUALIZE COMPLETELY THE FULL EAR EXAM.  FOLLOWING COMPLETE REMOVAL OF CERUMEN.  THE PROXIMAL CANAL SLIGHTLY ERYTHEMATOUS AND SWOLLEN BUT PATIENT STATES IT IS NOT TENDER.  THE TYMPANIC MEMBRANE ON THE LEFT SIDE IS NORMAL.  NO PERIAURICULAR ADENOPATHY OR TENDERNESS OR ERYTHEMA  Nose: No rhinorrhea or congestion.   Eyes: Conjunctivae are normal. Pupils are equal, round, and reactive to " "light. Right eye exhibits no discharge. Left eye exhibits no discharge. Extraocular movement intact   Cardiovascular: Normal rate and regular rhythm.   Pulmonary/Chest: No stridor. No respiratory distress.   Abdominal: Normal appearance.   Neurological: He is alert.   Skin: Skin is not diaphoretic.   Ear Cerumen Removal    Date/Time: 4/24/2025 5:19 PM    Performed by: Quinn Torres PA  Authorized by: Quinn Torres PA    Time out: Immediately prior to procedure a "time out" was called to verify the correct patient, procedure, equipment, support staff and site/side marked as required.    Consent Done?:  Yes (Verbal)    Local anesthetic:  None  Medication Used:  Other  Location details:  Left ear  Procedure type: curette    Cerumen  Removal Results:  Cerumen completely removed  Patient tolerance:  Patient tolerated the procedure well with no immediate complications     POSTERIOR TYMPANIC MEMBRANE APPEARS NORMAL.  BUT THE PROXIMAL CANAL IS SLIGHTLY SWOLLEN AND ERYTHEMATOUS DESPITE NOT BEING TENDER        Assessment:     1. Otitis externa of left ear, unspecified chronicity, unspecified type    2. Hearing loss of left ear due to cerumen impaction        Plan:       Otitis externa of left ear, unspecified chronicity, unspecified type  -     Discontinue: ofloxacin (FLOXIN) 0.3 % otic solution; Place 5 drops into the right ear 2 (two) times daily.  Dispense: 10 mL; Refill: 0  -     ofloxacin (FLOXIN) 0.3 % otic solution; Place 5 drops into the right ear 2 (two) times daily.  Dispense: 10 mL; Refill: 0    Hearing loss of left ear due to cerumen impaction  -     Ear wax removal  -     Ear Cerumen Removal      Follow up if symptoms worsen or fail to improve, for F/U with PCP or ED. There are no Patient Instructions on file for this visit.               "

## 2025-04-25 ENCOUNTER — OFFICE VISIT (OUTPATIENT)
Dept: URGENT CARE | Facility: CLINIC | Age: 70
End: 2025-04-25
Payer: MEDICARE

## 2025-04-25 VITALS
HEART RATE: 84 BPM | OXYGEN SATURATION: 95 % | RESPIRATION RATE: 16 BRPM | DIASTOLIC BLOOD PRESSURE: 66 MMHG | HEIGHT: 73 IN | TEMPERATURE: 98 F | SYSTOLIC BLOOD PRESSURE: 132 MMHG | WEIGHT: 229 LBS | BODY MASS INDEX: 30.35 KG/M2

## 2025-04-25 DIAGNOSIS — J01.00 ACUTE NON-RECURRENT MAXILLARY SINUSITIS: Primary | ICD-10-CM

## 2025-04-25 RX ORDER — AZELASTINE 1 MG/ML
1 SPRAY, METERED NASAL 2 TIMES DAILY
Qty: 30 ML | Refills: 0 | Status: SHIPPED | OUTPATIENT
Start: 2025-04-25 | End: 2026-04-25

## 2025-04-25 RX ORDER — AMOXICILLIN AND CLAVULANATE POTASSIUM 875; 125 MG/1; MG/1
1 TABLET, FILM COATED ORAL EVERY 12 HOURS
Qty: 14 TABLET | Refills: 0 | Status: SHIPPED | OUTPATIENT
Start: 2025-04-25 | End: 2025-05-02

## 2025-04-25 NOTE — PATIENT INSTRUCTIONS

## 2025-04-25 NOTE — PROGRESS NOTES
"Subjective:      Patient ID: Paddy Nair is a 69 y.o. male.    Vitals:  height is 6' 1" (1.854 m) and weight is 103.9 kg (229 lb). His oral temperature is 98.2 °F (36.8 °C). His blood pressure is 132/66 and his pulse is 84. His respiration is 16 and oxygen saturation is 95%.     Chief Complaint: Sinus Problem    Pt states that he has been having pressure in the right side of his face that goes and comes. Pt states that his symptoms started on yesterday.    Patient provider note starts here:  Patient presents to the clinic with complaints of having a pressure to the right maxillary sinus which goes up and down along his face.  Also reports his subjective swelling to the right maxillary area as well.  Reports that he has dental pain to the right side as well.  Symptoms started yesterday evening.  He took Zyrtec D this morning without significant relief of his symptoms in his scheduled to go out of town tomorrow.    Sinus Problem  This is a new problem. The current episode started yesterday. The problem has been gradually worsening since onset. There has been no fever. His pain is at a severity of 7/10. The pain is mild. Associated symptoms include congestion, ear pain, headaches, sinus pressure and sneezing. Pertinent negatives include no chills, coughing, hoarse voice, neck pain, shortness of breath or sore throat. Treatments tried: claritin. The treatment provided mild relief.       Constitution: Negative for chills and fever.   HENT:  Positive for ear pain, congestion, postnasal drip, sinus pain (right side) and sinus pressure. Negative for ear discharge and sore throat.    Neck: Negative for neck pain and neck stiffness.   Cardiovascular:  Negative for chest pain.   Respiratory:  Negative for chest tightness, cough, shortness of breath and wheezing.    Gastrointestinal:  Negative for abdominal pain, vomiting and diarrhea.   Musculoskeletal:  Negative for pain.   Skin:  Negative for rash and wound. "   Allergic/Immunologic: Positive for sneezing. Negative for itching.   Neurological:  Positive for headaches. Negative for numbness and tingling.      Objective:     Physical Exam   Constitutional: He is oriented to person, place, and time. He appears well-developed. He is cooperative.  Non-toxic appearance. He does not appear ill. No distress.   HENT:   Head: Normocephalic and atraumatic.   Ears:   Right Ear: Hearing, tympanic membrane, external ear and ear canal normal.   Left Ear: Hearing, tympanic membrane, external ear and ear canal normal.   Nose: Congestion present. No mucosal edema, rhinorrhea or nasal deformity. No epistaxis. Right sinus exhibits no maxillary sinus tenderness and no frontal sinus tenderness. Left sinus exhibits no maxillary sinus tenderness and no frontal sinus tenderness.      Comments: TTP over the right maxillary sinuses. There is mild overlying erythema in this region as well.   Mouth/Throat: Uvula is midline, oropharynx is clear and moist and mucous membranes are normal. No trismus in the jaw. Normal dentition. No uvula swelling. No oropharyngeal exudate, posterior oropharyngeal edema or posterior oropharyngeal erythema.   Eyes: Conjunctivae and lids are normal. No scleral icterus.   Neck: Trachea normal and phonation normal. Neck supple. No edema present. No erythema present. No neck rigidity present.   Cardiovascular: Normal rate, regular rhythm, normal heart sounds and normal pulses.   Pulmonary/Chest: Effort normal and breath sounds normal. No respiratory distress. He has no decreased breath sounds. He has no wheezes. He has no rhonchi.   Abdominal: Normal appearance.   Musculoskeletal: Normal range of motion.         General: No deformity. Normal range of motion.   Neurological: He is alert and oriented to person, place, and time. He exhibits normal muscle tone. Coordination normal.   Skin: Skin is warm, dry, intact, not diaphoretic and not pale.   Psychiatric: His speech is  normal and behavior is normal. Judgment and thought content normal.   Nursing note and vitals reviewed.      Assessment:     1. Acute non-recurrent maxillary sinusitis        Plan:       Acute non-recurrent maxillary sinusitis  -     amoxicillin-clavulanate 875-125mg (AUGMENTIN) 875-125 mg per tablet; Take 1 tablet by mouth every 12 (twelve) hours. for 7 days  Dispense: 14 tablet; Refill: 0  -     azelastine (ASTELIN) 137 mcg (0.1 %) nasal spray; 1 spray (137 mcg total) by Nasal route 2 (two) times daily.  Dispense: 30 mL; Refill: 0          Medical Decision Making:   History:   Old Medical Records: I decided to obtain old medical records.  Old Records Summarized: records from clinic visits.  Urgent Care Management:  A. Problem List:   -Acute: Acute maxillary sinusitis    -Chronic: history of prostate cancer, GUILLERMO  B. Differential diagnosis: viral vs bacterial URI, pharyngitis, otitis, COVID 19, influenza, pneumonia  C. Diagnostic Testing Ordered: None  D. Diagnostic Testing Considered: None  E. Independent Historians: None  F. Urgent Care Midlevel Independent Results Interpretation:   G. Radiology:  H. Review of Previous Medical Records: Evaluated here yesterday for cerumen impaction of the left ear and prescribed ofloxacin drops.   I. Home Medications Reviewed  J. Social Determinants of Health considered  K. Medical Decision Making and Disposition: Patient presents with complaints of right sided facial pain and pressure since yesterday. On exam, he is afebrile and nontoxic appearing. There is TTP over the right maxillary sinuses with overlying erythema. Advised close follow-up with PCP and ED precautions discussed. He verbalized understanding and agreed with plan.          Patient Instructions   Thank you for choosing [facility]!     Our goal in the Urgent Care is to always provide outstanding medical care. You may receive a survey by mail or e-mail in the next week regarding your experience today. We  would greatly appreciate you completing and returning the survey. Your feedback provides us with a way to recognize our staff who provide very good care, and it helps us learn how to improve when your experience was below our aspiration of excellence.       We appreciate you trusting us with your medical care. We hope you feel better soon. We will be happy to take care of you for all of your future medical needs.    You must understand that you've received an Urgent Care treatment only and that you may be released before all your medical problems are known or treated. You, the patient, will arrange for follow up care as instructed.      Follow up with your PCP or specialty clinic as instructed in the next 2-3 days if not improved or as needed. You can call [phone] to schedule an appointment with appropriate provider.      If you condition worsens, we recommend that you receive another evaluation at the emergency room immediately or contact your primary medical clinic's after hours call service to discuss your concerns.      Please return here or go to the Emergency Department for any concerns or worsening condition.

## 2025-05-14 ENCOUNTER — PATIENT MESSAGE (OUTPATIENT)
Dept: SLEEP MEDICINE | Facility: CLINIC | Age: 70
End: 2025-05-14

## 2025-05-14 ENCOUNTER — OFFICE VISIT (OUTPATIENT)
Dept: SLEEP MEDICINE | Facility: CLINIC | Age: 70
End: 2025-05-14
Payer: MEDICARE

## 2025-05-14 VITALS
WEIGHT: 227.06 LBS | DIASTOLIC BLOOD PRESSURE: 70 MMHG | HEART RATE: 70 BPM | SYSTOLIC BLOOD PRESSURE: 150 MMHG | BODY MASS INDEX: 30.09 KG/M2 | HEIGHT: 73 IN

## 2025-05-14 DIAGNOSIS — R35.1 NOCTURIA: ICD-10-CM

## 2025-05-14 DIAGNOSIS — G47.10 HYPERSOMNOLENCE: ICD-10-CM

## 2025-05-14 DIAGNOSIS — G47.33 OSA (OBSTRUCTIVE SLEEP APNEA): Primary | ICD-10-CM

## 2025-05-14 PROCEDURE — 99213 OFFICE O/P EST LOW 20 MIN: CPT | Mod: PBBFAC | Performed by: PHYSICIAN ASSISTANT

## 2025-05-14 PROCEDURE — 99999 PR PBB SHADOW E&M-EST. PATIENT-LVL III: CPT | Mod: PBBFAC,,, | Performed by: PHYSICIAN ASSISTANT

## 2025-05-14 PROCEDURE — 99214 OFFICE O/P EST MOD 30 MIN: CPT | Mod: S$PBB,,, | Performed by: PHYSICIAN ASSISTANT

## 2025-05-14 NOTE — PROGRESS NOTES
ESTABLISHED PATIENT VISIT    Paddy Nair  is a pleasant 69 y.o. male  with PMH significant for BPH, PrCA, low T, former smoker, GUILLERMO dx 2018 on CPAP    Here today for: CPAP follow-up     Initial visit (with me) 5/4/23:   Reports using CPAP nightly with good results since diagnosis in 2018. States he is here today for CPAP supplies. Denies any problems or concerns at this time.     Last visit 5/17/24:   Reports using CPAP nightly with good control of sx. States mask interface and pressure settings are comfortable. No complaints at this time. Presents today for annual follow up and to get updated order for CPAP supplies.   PLAN:  -using and benefiting from CPAP therapy  -continue CPAP 7-13cwp  -CPAP supplies ordered      Since last visit:   Using CPAP nightly with continued control of sx. Reports mask interface and pressure settings are comfortable. No complaints at this time. Presents today for annual follow up.    PAP history   Problems none   Mask DreamWear Nasal    Pressure 7-13cwp   DME HME   Machine age DS 10/22/18, DS2 rr 10/8/21   Download 5/14/25: 30/30 x 8hrs 23mins, 7-13cwp (11.5), leak (12.1), MF 99.6%, AHI 2.2         Past Medical History:   Diagnosis Date    Elevated PSA, less than 10 ng/ml 08/21/2018    Prostate cancer     Urinary tract infection      Patient Active Problem List   Diagnosis    Elevated PSA, less than 10 ng/ml    Vasculogenic erectile dysfunction    Low testosterone    GUILLERMO (obstructive sleep apnea)    Benign prostatic hyperplasia with nocturia    Urinary frequency    Nocturia    Fatigue    Abnormal level of female sex hormones    Elevated PSA    Family history of prostate cancer in father    Prostate cancer    Postoperative state    Sebaceous cyst    Encounter for colorectal cancer screening    History of prostate cancer    Impaired glucose tolerance    Rising PSA following treatment for malignant neoplasm of prostate    Status post radiation therapy       Current Outpatient  "Medications:     azelastine (ASTELIN) 137 mcg (0.1 %) nasal spray, 1 spray (137 mcg total) by Nasal route 2 (two) times daily., Disp: 30 mL, Rfl: 0    multivitamin capsule, Take 1 capsule by mouth once daily., Disp: , Rfl:     ofloxacin (FLOXIN) 0.3 % otic solution, Place 5 drops into the right ear 2 (two) times daily. (Patient not taking: Reported on 2025), Disp: 10 mL, Rfl: 0    tadalafiL (CIALIS) 20 MG Tab, Take 1 tablet (20 mg total) by mouth daily as needed (erectile dysfunction)., Disp: 30 tablet, Rfl: 11       There were no vitals filed for this visit.    Physical Exam:    GEN:   Well-appearing  Psych:  Appropriate affect, demonstrates insight  SKIN:  No rash on the face or bridge of the nose      LABS:   No results found for: "HGB", "CO2"      RECORDS REVIEWED:    HST 10/4/2018: AHI 22, RDI 33    Previous sleep note: 23, 24    CPAP interrogation 25: 30/30 x 8hrs 23mins, 7-13cwp (11.5), leak (12.1), MF 99.6%, AHI 2.2    ASSESSMENT    [place] Sleepiness Scale:  Sitting and readin  Watching TV:    1  Passenger in a car x 1 hr:  0  Sitting quietly after lunch:  0  Lying down to rest in PM:  1  Sitting, inactive in public:  0  Sitting+ talking to someone:  0  Stopped in traffic:   0  Total    3/24    PROBLEM DESCRIPTION/ Sx on Presentation Interval Hx STATUS   GUILLERMO    + snoring, +arousals  [place] GUILLERMO AHI 22 Wearing nightly with good efficacy  controlled   Daytime Sx    + sleepiness when inactive, occasionally   Denies sleepiness while driving  ESS  on intake (reviewed from 18) Much improved on CPAP    improved   Nocturia    x 2 per sleep period 1-2 x nightly stable          PLAN      -using and benefiting from CPAP therapy  -continue CPAP 7-13cwp  -CPAP supplies ordered  -discussed GUILLERMO and PAP with patient in detail, including possible complications of untreated GUILLERMO like heart attack/stroke  -advised on strict driving precautions; advised never to drive drowsy     Advised on " plan of care. Answered all patient questions. Patient verbalized understanding and voiced agreement with plan of care.     RTC 12 months or as needed      The patient was given open opportunity to ask questions and/or express concerns about treatment plan. All questions/concerns were discussed.     Two patient identifiers used prior to evaluation.

## 2025-06-04 ENCOUNTER — PATIENT MESSAGE (OUTPATIENT)
Dept: PRIMARY CARE CLINIC | Facility: CLINIC | Age: 70
End: 2025-06-04
Payer: MEDICARE

## 2025-06-04 DIAGNOSIS — R53.83 FATIGUE, UNSPECIFIED TYPE: ICD-10-CM

## 2025-06-04 DIAGNOSIS — E78.6 LOW HDL (UNDER 40): Primary | ICD-10-CM

## 2025-06-04 DIAGNOSIS — R73.02 IMPAIRED GLUCOSE TOLERANCE: ICD-10-CM

## 2025-06-09 ENCOUNTER — TELEPHONE (OUTPATIENT)
Dept: UROLOGY | Facility: CLINIC | Age: 70
End: 2025-06-09
Payer: MEDICARE

## 2025-06-09 NOTE — TELEPHONE ENCOUNTER
----- Message from Gagandeep Carrillo MD sent at 6/6/2025 12:24 PM CDT -----  PSA remains normal at less than 0.01, testosterone is 224.  ----- Message -----  From: Lab, Background User  Sent: 6/5/2025   9:14 PM CDT  To: Gagandeep Carrillo MD

## 2025-06-12 ENCOUNTER — RESULTS FOLLOW-UP (OUTPATIENT)
Dept: PRIMARY CARE CLINIC | Facility: CLINIC | Age: 70
End: 2025-06-12

## 2025-06-13 ENCOUNTER — OFFICE VISIT (OUTPATIENT)
Dept: PRIMARY CARE CLINIC | Facility: CLINIC | Age: 70
End: 2025-06-13
Payer: MEDICARE

## 2025-06-13 VITALS
HEART RATE: 63 BPM | BODY MASS INDEX: 30.01 KG/M2 | DIASTOLIC BLOOD PRESSURE: 82 MMHG | HEIGHT: 73 IN | RESPIRATION RATE: 18 BRPM | OXYGEN SATURATION: 96 % | WEIGHT: 226.44 LBS | SYSTOLIC BLOOD PRESSURE: 138 MMHG

## 2025-06-13 DIAGNOSIS — C61 PROSTATE CANCER: ICD-10-CM

## 2025-06-13 DIAGNOSIS — R73.02 IMPAIRED GLUCOSE TOLERANCE: ICD-10-CM

## 2025-06-13 DIAGNOSIS — G47.33 OSA (OBSTRUCTIVE SLEEP APNEA): ICD-10-CM

## 2025-06-13 DIAGNOSIS — N40.1 BENIGN PROSTATIC HYPERPLASIA WITH NOCTURIA: Primary | ICD-10-CM

## 2025-06-13 DIAGNOSIS — R35.1 BENIGN PROSTATIC HYPERPLASIA WITH NOCTURIA: Primary | ICD-10-CM

## 2025-06-13 PROCEDURE — 99213 OFFICE O/P EST LOW 20 MIN: CPT | Mod: PBBFAC | Performed by: INTERNAL MEDICINE

## 2025-06-13 PROCEDURE — 99999 PR PBB SHADOW E&M-EST. PATIENT-LVL III: CPT | Mod: PBBFAC,,, | Performed by: INTERNAL MEDICINE

## 2025-06-13 NOTE — PROGRESS NOTES
[facility] Note    Chief Complaint      Chief Complaint   Patient presents with    Follow-up       History of Present Illness      Paddy Nair is a 69 y.o. male who presents today for   Chief Complaint   Patient presents with    Follow-up   .  Patient comes to appointment here for checkup for chronic issues as below . He had full alsb all reviewed with patient today by me . He remaisn in igt range with blood sugar     HPI    No problem-specific Assessment & Plan notes found for this encounter.       Problem List Items Addressed This Visit       GUILLERMO (obstructive sleep apnea)    Overview   Is utilizing cpap nightly with great benefit . Using all night everynight         Benign prostatic hyperplasia with nocturia - Primary    Overview   Dr shelley managing          Prostate cancer    Overview   Cont per dr shelley in treatment with radiation and casodex          Impaired glucose tolerance    Overview   a1c 6.3 cont to strongly encourage exercise and diet              Past Medical History:  Past Medical History:   Diagnosis Date    Elevated PSA, less than 10 ng/ml 08/21/2018    Prostate cancer     Urinary tract infection        Past Surgical History:  Past Surgical History:   Procedure Laterality Date    CATARACT EXTRACTION Bilateral     EXCISION OF LESION OF SCROTUM Right 09/23/2019    Procedure: EXCISION, LESION, SCROTUM;  Surgeon: Gagandeep Shelley MD;  Location: [facility] OR;  Service: Urology;  Laterality: Right;    EYE SURGERY  Sept 2024    PROSTATE SURGERY  9/21/19    RADICAL RETROPUBIC PROSTATECTOMY N/A 09/23/2019    Procedure: PROSTATECTOMY, RETROPUBIC, RADICAL,;  Surgeon: Gagandeep Shelley MD;  Location: [facility] OR;  Service: Urology;  Laterality: N/A;    TONSILLECTOMY         Family History:  family history includes Cancer in his brother, brother, and father; Diabetes in his maternal aunt and maternal grandmother; No Known Problems in his mother.     Social History:  Social  "History[1]    Review of Systems:   Review of Systems   Constitutional:  Negative for fever and weight loss.   HENT:  Negative for congestion, hearing loss and sore throat.    Eyes:  Negative for blurred vision.   Respiratory:  Negative for cough and shortness of breath.    Cardiovascular:  Negative for chest pain, palpitations, claudication and leg swelling.   Gastrointestinal:  Negative for abdominal pain, constipation, diarrhea and heartburn.   Genitourinary:  Negative for dysuria.   Musculoskeletal:  Negative for back pain and myalgias.   Skin:  Negative for rash.   Neurological:  Negative for focal weakness and headaches.   Psychiatric/Behavioral:  Negative for depression and suicidal ideas. The patient is not nervous/anxious.         Medications:  Encounter Medications[2]    Allergies:  Review of patient's allergies indicates:   Allergen Reactions    Bactrim [sulfamethoxazole-trimethoprim] Hives    Metronidazole      Abnormal heart rate/tachycardia toward end of treatment with medication possibly anaphylaxis         Physical Exam      Vitals:    06/13/25 1401   BP: 138/82   Pulse: 63   Resp: 18        Vital Signs  Pulse: 63  Resp: 18  SpO2: 96 %  BP: 138/82  BP Location: Left arm  Patient Position: Sitting  Pain Score: 0-No pain  Height and Weight  Height: 6' 1" (185.4 cm)  Weight: 102.7 kg (226 lb 6.6 oz)  BSA (Calculated - sq m): 2.3 sq meters  BMI (Calculated): 29.9  Weight in (lb) to have BMI = 25: 189.1]     Body mass index is 29.87 kg/m².    Physical Exam  Constitutional:       Appearance: He is well-developed.   HENT:      Head: Normocephalic.   Eyes:      Pupils: Pupils are equal, round, and reactive to light.   Neck:      Thyroid: No thyromegaly.   Cardiovascular:      Rate and Rhythm: Normal rate and regular rhythm.      Heart sounds: No murmur heard.     No friction rub. No gallop.   Pulmonary:      Effort: Pulmonary effort is normal.      Breath sounds: Normal breath sounds.   Abdominal:      " "General: Bowel sounds are normal.      Palpations: Abdomen is soft.   Musculoskeletal:         General: Normal range of motion.      Cervical back: Normal range of motion.   Skin:     General: Skin is warm and dry.   Neurological:      Mental Status: He is alert and oriented to person, place, and time.      Sensory: No sensory deficit.   Psychiatric:         Behavior: Behavior normal.          Laboratory:  CBC:  Recent Labs   Lab Result Units 06/12/25  0754   WBC K/uL 6.25   RBC M/uL 4.65   HGB gm/dL 13.8*   HCT % 43.0   Platelet Count K/uL 219   MCV fL 93   MCH pg 29.7   MCHC g/dL 32.1     CMP:  Recent Labs   Lab Result Units 06/12/25  0754   Glucose mg/dL 147*   Calcium mg/dL 9.3   Albumin g/dL 3.7   Protein Total gm/dL 7.8   Sodium mmol/L 140   Potassium mmol/L 4.6   CO2 mmol/L 26   Chloride mmol/L 109   BUN mg/dL 16   ALP unit/L 65   ALT unit/L 14   AST unit/L 18   Bilirubin Total mg/dL 0.4     URINALYSIS:  No results for input(s): "COLORU", "CLARITYU", "SPECGRAV", "PHUR", "PROTEINUA", "GLUCOSEU", "BILIRUBINCON", "BLOODU", "WBCU", "RBCU", "BACTERIA", "MUCUS", "NITRITE", "LEUKOCYTESUR", "UROBILINOGEN", "HYALINECASTS" in the last 2160 hours.   LIPIDS:  Recent Labs   Lab Result Units 06/12/25  0754   HDL Cholesterol mg/dL 39*   Cholesterol Total mg/dL 206*   Triglyceride mg/dL 218*   LDL Cholesterol mg/dL 123.4   HDL/Cholesterol Ratio % 18.9*   Non HDL Cholesterol mg/dL 167   Cholesterol/HDL Ratio  5.3*     TSH:  No results for input(s): "TSH" in the last 2160 hours.  A1C:  Recent Labs   Lab Result Units 06/12/25  0754   Hemoglobin A1c % 6.3*       Radiology:        Assessment:     Paddy Nair is a 69 y.o.male with:    Benign prostatic hyperplasia with nocturia    Prostate cancer    GUILLERMO (obstructive sleep apnea)    Impaired glucose tolerance                Plan:     Problem List Items Addressed This Visit       GUILLERMO (obstructive sleep apnea)    Overview   Is utilizing cpap nightly with great benefit . " Using all night everynight         Benign prostatic hyperplasia with nocturia - Primary    Overview   Dr shelley managing          Prostate cancer    Overview   Cont per dr shelley in treatment with radiation and casodex          Impaired glucose tolerance    Overview   a1c 6.3 cont to strongly encourage exercise and diet             As above, continue current medications and maintain follow up with specialists.  Return to clinic in 6 months.      Frederick W Dantagnan [facility]                       [1]   Social History  Socioeconomic History    Marital status:    Tobacco Use    Smoking status: Former     Current packs/day: 0.00     Types: Cigarettes     Start date: 1975     Quit date: 2017     Years since quittin.5    Smokeless tobacco: Never   Substance and Sexual Activity    Alcohol use: Yes     Alcohol/week: 1.0 - 2.0 standard drink of alcohol     Types: 1 - 2 Cans of beer per week     Comment: Maybe 2 beers a month    Drug use: No    Sexual activity: Not Currently     Partners: Female     Birth control/protection: None     Social Drivers of Health     Financial Resource Strain: Low Risk  (2024)    Overall Financial Resource Strain (CARDIA)     Difficulty of Paying Living Expenses: Not hard at all   Food Insecurity: No Food Insecurity (2024)    Hunger Vital Sign     Worried About Running Out of Food in the Last Year: Never true     Ran Out of Food in the Last Year: Never true   Transportation Needs: No Transportation Needs (2024)    PRAPARE - Transportation     Lack of Transportation (Medical): No     Lack of Transportation (Non-Medical): No   Physical Activity: Unknown (2024)    Exercise Vital Sign     Days of Exercise per Week: 0 days   Stress: No Stress Concern Present (2024)    [employer] [place] [employer] - Occupational Stress Questionnaire     Feeling of Stress : Not at all   Housing Stability: Low Risk   (2/29/2024)    Housing Stability Vital Sign     Unable to Pay for Housing in the Last Year: No     Number of Places Lived in the Last Year: 1     Unstable Housing in the Last Year: No   [2]   Outpatient Encounter Medications as of 6/13/2025   Medication Sig Dispense Refill    multivitamin capsule Take 1 capsule by mouth once daily.      [DISCONTINUED] azelastine (ASTELIN) 137 mcg (0.1 %) nasal spray 1 spray (137 mcg total) by Nasal route 2 (two) times daily. 30 mL 0    [DISCONTINUED] ofloxacin (FLOXIN) 0.3 % otic solution Place 5 drops into the right ear 2 (two) times daily. (Patient not taking: Reported on 4/25/2025) 10 mL 0    [DISCONTINUED] tadalafiL (CIALIS) 20 MG Tab Take 1 tablet (20 mg total) by mouth daily as needed (erectile dysfunction). 30 tablet 11     No facility-administered encounter medications on file as of 6/13/2025.

## 2025-07-08 ENCOUNTER — TELEPHONE (OUTPATIENT)
Dept: HEMATOLOGY/ONCOLOGY | Facility: CLINIC | Age: 70
End: 2025-07-08
Payer: MEDICARE

## 2025-07-08 NOTE — TELEPHONE ENCOUNTER
Called patient to confirm the in office appointment on 7/15/25 and to complete the questionnaire.

## 2025-07-15 ENCOUNTER — LAB VISIT (OUTPATIENT)
Dept: LAB | Facility: HOSPITAL | Age: 70
End: 2025-07-15
Payer: MEDICARE

## 2025-07-15 ENCOUNTER — OFFICE VISIT (OUTPATIENT)
Dept: HEMATOLOGY/ONCOLOGY | Facility: CLINIC | Age: 70
End: 2025-07-15
Payer: MEDICARE

## 2025-07-15 ENCOUNTER — PATIENT MESSAGE (OUTPATIENT)
Dept: HEMATOLOGY/ONCOLOGY | Facility: CLINIC | Age: 70
End: 2025-07-15

## 2025-07-15 DIAGNOSIS — Z80.42 FAMILY HISTORY OF PROSTATE CANCER: ICD-10-CM

## 2025-07-15 DIAGNOSIS — C61 PROSTATE CANCER: ICD-10-CM

## 2025-07-15 DIAGNOSIS — Z71.83 ENCOUNTER FOR NONPROCREATIVE GENETIC COUNSELING: Primary | ICD-10-CM

## 2025-07-15 PROCEDURE — 99205 OFFICE O/P NEW HI 60 MIN: CPT | Mod: S$PBB,,, | Performed by: PHYSICIAN ASSISTANT

## 2025-07-15 PROCEDURE — 99212 OFFICE O/P EST SF 10 MIN: CPT | Mod: PBBFAC | Performed by: PHYSICIAN ASSISTANT

## 2025-07-15 PROCEDURE — 99999 PR PBB SHADOW E&M-EST. PATIENT-LVL II: CPT | Mod: PBBFAC,,, | Performed by: PHYSICIAN ASSISTANT

## 2025-07-15 PROCEDURE — 36415 COLL VENOUS BLD VENIPUNCTURE: CPT

## 2025-07-15 NOTE — PROGRESS NOTES
Hereditary/High Risk Clinic  Department of Hematology and Oncology  [facility]    Cancer Genetics  Initial Consultation    Date of Service:  7/15/25  Visit Provider:  Kalpana Skaggs PA-C  Collaborating Physician:  Jacque Romano MD    Patient:  Paddy Nair  :  1955  MRN:   [id number]     Referring Provider    [facility], Provider  [address] [place] [address]    Approximately 40 minutes were spent face-to-face with the patient.    Approximately 65 minutes in total were spent on this encounter, which includes face-to-face time and non-face-to-face time preparing to see the patient (e.g., review of tests), obtaining and/or reviewing separately obtained history, documenting clinical information in the electronic or other health record, independently interpreting results (not separately reported) and communicating results to the patient/family/caregiver, or care coordination (not separately reported).     SUBJECTIVE   Chief Complaint: Cancer genetic risk assessment    HPI:  Paddy Nair is a 69 y.o. assigned male at birth who is new to the [facility] and to me.     He completed the Genetic Wellness Assessment and requested to see a genetics provider due to the following:   [x] Personal history of cancer   [x] Family history of cancer  [] FDR/SDR tested positive for a genetic mutation (hereditary cancer syndrome)    Focused personal history:   Cancer:   Prostate cancer at 63, 2019 (PSA 5.5, [place] 6) s/p prostatectomy with suspected residual disease s/p radiation to the surgical bed and ADT.  Other tumor or pertinent mass/lesion: No  Blood disorder:  No  Pancreatitis:  No  Ashkenazi Shinto:  No  Germline cancer-genetic testing: No    Cancer Screening:   PSA: <0.01 (2025)  Colonoscopy: No report available  Cologuard: Negative 2021    Family History  Consanguinity: No  Hereditary cancer genetic testing in blood relatives:  No  Pertinent family history:   Prostate cancer: Brother 61, brother 62, father 60, paternal uncle 60.  MGF: Pancreatic cancer 73,         If this pedigree appears small/illegible on your screen, expand this note window horizontally. A copy of the pedigree is also available in Media.     Past Medical History:   Diagnosis Date    Prostate cancer 2019    Urinary tract infection      Social History     Tobacco Use    Smoking status: Former     Current packs/day: 0.00     Types: Cigarettes     Start date: 1975     Quit date: 2017     Years since quittin.6    Smokeless tobacco: Never   Substance Use Topics    Alcohol use: Yes     Alcohol/week: 1.0 - 2.0 standard drink of alcohol     Types: 1 - 2 Cans of beer per week     Comment: Maybe 2 beers a month     Review of Systems  See HPI.    Family History   Problem Relation Name Age of Onset    No Known Problems Mother Alexandrea     Prostate cancer Father Tyson Nair 60        radioactive seeds    Prostate cancer Brother Clifton Nair 61        prosatectomy    Prostate cancer Brother Lizandro Nair 62        prostatectomy    Diabetes Maternal Grandmother Scarlett Monet     Pancreatic cancer Maternal Grandfather Tera 73    Diabetes Maternal Aunt Kavya Alfaro     Prostate cancer Paternal Uncle Maya 60    Kidney disease Neg Hx       OBJECTIVE   Physical Exam  Very pleasant patient.  Accompanied by his wife Lalitha  Vitals signs:  There were no vitals filed for this visit.   Constitutional: No apparent distress.   Pulmonary: Normal effort  Neurological: Alert and oriented. No obvious neurological deficits.   Psychiatric: Normal mood, affect, thought content, speech, behavior, judgment.    COUNSELING     Cancer is caused by an accumulation of genetic mutations that allow cells to grow and divide uncontrollably to form a tumor. Individuals with certain risk factors are more likely to develop genetic mutations that lead to cancer.      Age:  Advancing age is the most important risk factor for cancer overall and for many individual cancer types.    Alcohol: Drinking alcohol can increase your risk of cancer of the mouth, throat, esophagus, larynx, liver, and breast.    Smoking: Tobacco contains benzene and other chemicals and can cause cancer of the lung, larynx, mouth, esophagus, throat, bladder, kidney, liver, stomach, pancreas, colon and rectum, and cervix, as well as acute myeloid leukemia.    Chemicals: Asbestos, benzene, vinyl chloride, pesticides, fertilizer, wood dust, radon, aflatoxin (a food contaminant), arsenic (a drinking water contaminant), etc.  Radiation: Ultraviolet (sun, tanning beds) and ionizing radiation (radiation therapy, xrays, CT scans).  Chronic inflammation: Chronic infections (Radha-Barr virus, HPV, hepatitis B and C, H. Pylori), abnormal immune reactions, and conditions like obesity and inflammatory bowel disease can cause DNA damage and cancer.   Hormones: Combined hormone therapy (estrogen and progestin) increases the risk for breast cancer. Hormone therapy with estrogen alone increases the risk for endometrial cancer. Diethylstilbestrol (RONNIE) is a form of estrogen given to some pregnant women from 4137-6130 to prevent premature labor, miscarriages, and other problems. Women who took RONNIE have an increased risk for breast cancers and their daughters have an increased risk for vaginal and cervical cancer.   Medical conditions: Fatty liver disease, cirrhosis, type 2 diabetes, metabolic syndrome (obesity, high blood pressure, high cholesterol, insuline resistance).   Immunosuppression: Due to condition (HIV/AIDS) or taking immunosuppressive medications (after organ transplant, etc).     While most individuals have a family history of cancer, only 5-10% of cancers are hereditary, meaning they are caused by inherited genetic mutations. The remaining cancers are sporadic, meaning they are caused by mutations acquired during the  individual's life as a result of aging, environmental exposures, and other causes. Some families have multiple cases of a particular cancer, but that is usually due to the cancer being very common (lung, breast, prostate), shared risk factors (environment, lifestyle), and possibly a combination of hereditary factors (for example, some families have abnormalities in their immune system or how their bodies process toxins).     Sporadic cancer (75-80%): Sporadic cancers are random occurrences caused by an accumulation of genetic mutations acquired during the individual's lifetime.   Hereditary cancer (5-10%): Hereditary cancers are caused by an inherited mutation in a single gene (usually a tumor suppressor gene) and acquired mutations. A family with a hereditary cancer syndrome will typically have individuals in every generation with the cancers caused by that gene, often diagnosed 10-20 years younger than usual, which for most cancers is before age 50.   Familial cancer (20%): Familial cancer refers to a clustering a cancer in a family that may be more than you would expect to see by chance, but they do not have an inherited mutation in a single gene that caused the cancers. Instead, their cancers are caused by a combination of shared genetic, environmental, and lifestyle factors.      GERMLINE GENETIC TESTING    Purpose:  Analyze specific genes for inherited mutations.     Results: Positive, negative, and uncertain. A positive result means a cancer-causing mutation was detected. A negative result means no cancer-causing mutations were detected. Uncertain means a genetic change was detected but it is not known if that change results in an increased risk for cancer.     Benefits: When an individual is aware that they have a germline mutation that increases their risk for certain cancers, they can engage in the recommended screening and risk-reduction strategies.     Risks: Genetic discrimination occurs when any entity  uses an individual's genetic information to make a decision that negatively impacts them. Examples would include an insurance refusing to issue a policy or an employer refusing to hire or promote someone because they have a hereditary cancer syndrome (germline mutation). The Genetic Information Nondiscrimination Act of 2008 (ASHELY) is a federal law that protects healthcare insurance and employment at companies with 15 or more employees. This law does not protect elective insurance or employment with companies who have less than 15 employees.   Per Title I, group health plans cannot base premiums for a medical insurance plan or a group of similarly situated individuals on genetic information. ASHELY generally prohibits plans from requesting or requiring an individual to undergo genetic tests, and prohibits a plan from collecting genetic information (including family medical history) prior to or in connection with enrollment, or for underwriting purposes.  Title 1 does not protect medical insurance obtained through the federal government or . Those entities have their own set of rules regarding genetic information.   This rule only applies to medical/health insurance. Other types of insurance (life, disability, long-term care, cancer policies, etc) are not protected. An individual can be denied coverage or charged a higher premium based on their genetic information.   Per Title II, it is illegal to discriminate against employees or applicants because of genetic information. Title II of ASHELY prohibits the use of genetic information in making employment decisions, restricts employers and other entities covered by Title II (employment agencies, labor organizations and joint labor-management training and apprenticeship  ASHELY has a small business exemption for employers with less than 15 employees.     PROTECTS DOES NOT PROTECT   ASHELY Title I  Medical insurance Elective insurance (life, cancer, disability)  Medical  insurance obtained through the [employer] government or [employer] Title II Employment at companies with 15 or more employees Employment at companies with less than 15 employees     Cost:   Cost with insurance: If testing is covered by insurance, most people pay $0-100 out of pocket for testing. The max anyone typically pays is $250, which is usually if they haven't met their deductible for the year.   Cost without insurance: The cash price for testing is $250. The cash price applies to people who do not have health insurance or have insurance, but their insurance company denied the claim due to not meeting the insurance company's clinical criteria for testing.   Most labs offer financial assistance that can lower the out of pocket amount owed for testing.     ASSESSMENT/PLAN   Comprehensive cancer risk assessment was performed today, which included an evaluation of the patient's personal and family history of cancer/tumors/polyps, estimation of the likelihood of hereditary cancer syndrome, and discussion of the potential value and risks of genetic testing.     Based on the information provided, Paddy's personal/family history is suggestive of a possible hereditary predisposition to cancer and meets current clinical guidelines for genetic testing.      The recommendation is to proceed with germline testing to include the genes commonly associated with hereditary pancreatic cancer (APC, PRAVEENA, BRCA1, BRCA2, CDKN2A, EPCAM, MLH1, MSH2, MSH6, PALB2, PMS2, STK11, TP53) and prostate cancer (PRAVEENA, BRCA1, BRCA2, CHEK2, EPCAM, HOXB13, MLH1, MSH2, MSH6, PALB2, PMS2, TP53). Paddy was given the option of proceeding with testing now, deferring testing to a later date, or declining testing and opted to proceed.       1. Encounter for nonprocreative genetic counseling    2. Prostate cancer  - [facility] - Hereditary Cancer with RNA; Future    3. Family history of prostate cancer  - [facility] - Hereditary Cancer with RNA; Future    -  Proceed with germline genetic testing as noted below.   - Follow up in 24 days (on 8/8/2025) for results review, post-test genetic counseling.    Genetics lab: [facility]        Genetic test: [facility] xG+ CancerNext-Expanded +RNAinsight   Indication/ICD Codes:  FH pancreatic cancer (Z80.0)  PH prostate cancer + FH prostate cancer (C61, Z80.42)   Specimen type: Blood   Specimen collection by: [facility]   Specimen collection date: 7/15/25   Results expected by: Approximately 3 weeks after the genetic testing lab receives the specimen   Results disclosure plan: [facility] results populate directly to the [facility] patient portal.   Video post-test visit on 8/8 at 10:00   Verbal informed consent: Obtained       Questions were encouraged and answered to the patient's satisfaction, and he verbalized understanding of information and agreement with the plan.       REFERENCES   National Comprehensive Cancer Network (NCCN). Genetic/familial high-risk assessment: Breast, ovarian, pancreatic, and prostate. NCCN Clinical Practice Guidelines in Oncology (NCCN Guidelines), Version 3.2025.      KORIN Orantes, PA-C  Physician Assistant, Hereditary & High Risk Clinic  Hematology Oncology, [facility]

## 2025-07-25 LAB
GENE DIS ANL INTERP-IMP: NORMAL
TEMPUS GENES ANALYZED: NORMAL
TEMPUS INTERPRETATION REPORT: NORMAL
TEMPUS PORTAL: NORMAL

## 2025-08-29 ENCOUNTER — PATIENT MESSAGE (OUTPATIENT)
Dept: UROLOGY | Facility: CLINIC | Age: 70
End: 2025-08-29
Payer: MEDICARE

## 2025-08-29 DIAGNOSIS — C61 PROSTATE CANCER: Primary | ICD-10-CM

## 2025-09-02 ENCOUNTER — TELEPHONE (OUTPATIENT)
Dept: UROLOGY | Facility: CLINIC | Age: 70
End: 2025-09-02
Payer: MEDICARE

## 2025-09-05 ENCOUNTER — OFFICE VISIT (OUTPATIENT)
Dept: UROLOGY | Facility: CLINIC | Age: 70
End: 2025-09-05
Payer: MEDICARE

## 2025-09-05 VITALS
DIASTOLIC BLOOD PRESSURE: 78 MMHG | BODY MASS INDEX: 29.83 KG/M2 | SYSTOLIC BLOOD PRESSURE: 144 MMHG | OXYGEN SATURATION: 99 % | WEIGHT: 226.06 LBS | HEART RATE: 59 BPM

## 2025-09-05 DIAGNOSIS — R97.20 ELEVATED PSA, LESS THAN 10 NG/ML: ICD-10-CM

## 2025-09-05 DIAGNOSIS — N52.03 COMBINED ARTERIAL INSUFFICIENCY AND CORPORO-VENOUS OCCLUSIVE ERECTILE DYSFUNCTION: ICD-10-CM

## 2025-09-05 DIAGNOSIS — Z85.46 HISTORY OF PROSTATE CANCER: Primary | ICD-10-CM

## 2025-09-05 DIAGNOSIS — C61 PROSTATE CANCER: ICD-10-CM

## 2025-09-05 DIAGNOSIS — Z92.3 STATUS POST RADIATION THERAPY: ICD-10-CM

## 2025-09-05 PROCEDURE — 99213 OFFICE O/P EST LOW 20 MIN: CPT | Mod: PBBFAC,PO | Performed by: UROLOGY

## 2025-09-05 PROCEDURE — 99999 PR PBB SHADOW E&M-EST. PATIENT-LVL III: CPT | Mod: PBBFAC,,, | Performed by: UROLOGY
